# Patient Record
Sex: MALE | Race: WHITE
[De-identification: names, ages, dates, MRNs, and addresses within clinical notes are randomized per-mention and may not be internally consistent; named-entity substitution may affect disease eponyms.]

---

## 2017-04-17 ENCOUNTER — HOSPITAL ENCOUNTER (INPATIENT)
Dept: HOSPITAL 25 - ED | Age: 62
LOS: 5 days | Discharge: HOME | DRG: 638 | End: 2017-04-22
Attending: HOSPITALIST | Admitting: INTERNAL MEDICINE
Payer: COMMERCIAL

## 2017-04-17 DIAGNOSIS — I89.0: ICD-10-CM

## 2017-04-17 DIAGNOSIS — J44.9: ICD-10-CM

## 2017-04-17 DIAGNOSIS — Z80.9: ICD-10-CM

## 2017-04-17 DIAGNOSIS — E11.621: ICD-10-CM

## 2017-04-17 DIAGNOSIS — M86.672: ICD-10-CM

## 2017-04-17 DIAGNOSIS — B96.89: ICD-10-CM

## 2017-04-17 DIAGNOSIS — Z98.84: ICD-10-CM

## 2017-04-17 DIAGNOSIS — Z79.4: ICD-10-CM

## 2017-04-17 DIAGNOSIS — E78.5: ICD-10-CM

## 2017-04-17 DIAGNOSIS — I25.10: ICD-10-CM

## 2017-04-17 DIAGNOSIS — Z89.421: ICD-10-CM

## 2017-04-17 DIAGNOSIS — I45.10: ICD-10-CM

## 2017-04-17 DIAGNOSIS — G89.29: ICD-10-CM

## 2017-04-17 DIAGNOSIS — E11.42: ICD-10-CM

## 2017-04-17 DIAGNOSIS — M19.90: ICD-10-CM

## 2017-04-17 DIAGNOSIS — L03.116: ICD-10-CM

## 2017-04-17 DIAGNOSIS — G47.33: ICD-10-CM

## 2017-04-17 DIAGNOSIS — E66.01: ICD-10-CM

## 2017-04-17 DIAGNOSIS — Z82.49: ICD-10-CM

## 2017-04-17 DIAGNOSIS — B95.2: ICD-10-CM

## 2017-04-17 DIAGNOSIS — I10: ICD-10-CM

## 2017-04-17 DIAGNOSIS — E78.00: ICD-10-CM

## 2017-04-17 DIAGNOSIS — Z83.3: ICD-10-CM

## 2017-04-17 DIAGNOSIS — M25.569: ICD-10-CM

## 2017-04-17 DIAGNOSIS — E11.69: Primary | ICD-10-CM

## 2017-04-17 DIAGNOSIS — M54.9: ICD-10-CM

## 2017-04-17 DIAGNOSIS — E11.65: ICD-10-CM

## 2017-04-17 DIAGNOSIS — F17.210: ICD-10-CM

## 2017-04-17 DIAGNOSIS — I87.2: ICD-10-CM

## 2017-04-17 DIAGNOSIS — B95.62: ICD-10-CM

## 2017-04-17 DIAGNOSIS — L97.429: ICD-10-CM

## 2017-04-17 LAB
ALBUMIN SERPL BCG-MCNC: 3.8 G/DL (ref 3.2–5.2)
ALP SERPL-CCNC: 77 U/L (ref 34–104)
ALT SERPL W P-5'-P-CCNC: 13 U/L (ref 7–52)
ANION GAP SERPL CALC-SCNC: 4 MMOL/L (ref 2–11)
AST SERPL-CCNC: 10 U/L (ref 13–39)
BUN SERPL-MCNC: 17 MG/DL (ref 6–24)
BUN/CREAT SERPL: 21 (ref 8–20)
CALCIUM SERPL-MCNC: 9.7 MG/DL (ref 8.6–10.3)
CHLORIDE SERPL-SCNC: 94 MMOL/L (ref 101–111)
CK SERPL-CCNC: 37 U/L (ref 10–223)
GLOBULIN SER CALC-MCNC: 4.3 G/DL (ref 2–4)
GLUCOSE SERPL-MCNC: 61 MG/DL (ref 70–100)
HCO3 SERPL-SCNC: 38 MMOL/L (ref 22–32)
HCT VFR BLD AUTO: 36 % (ref 42–52)
HGB BLD-MCNC: 11.7 G/DL (ref 14–18)
MCH RBC QN AUTO: 27 PG (ref 27–31)
MCHC RBC AUTO-ENTMCNC: 33 G/DL (ref 31–36)
MCV RBC AUTO: 82 FL (ref 80–94)
POTASSIUM SERPL-SCNC: 3.4 MMOL/L (ref 3.5–5)
PROT SERPL-MCNC: 8.1 G/DL (ref 6.4–8.9)
RBC # BLD AUTO: 4.42 10^6/UL (ref 4–5.4)
SODIUM SERPL-SCNC: 136 MMOL/L (ref 133–145)
TROPONIN I SERPL-MCNC: 0.01 NG/ML (ref ?–0.04)
WBC # BLD AUTO: 9.4 10^3/UL (ref 3.5–10.8)

## 2017-04-17 PROCEDURE — 87641 MR-STAPH DNA AMP PROBE: CPT

## 2017-04-17 PROCEDURE — 81003 URINALYSIS AUTO W/O SCOPE: CPT

## 2017-04-17 PROCEDURE — 87640 STAPH A DNA AMP PROBE: CPT

## 2017-04-17 PROCEDURE — 83036 HEMOGLOBIN GLYCOSYLATED A1C: CPT

## 2017-04-17 PROCEDURE — 84484 ASSAY OF TROPONIN QUANT: CPT

## 2017-04-17 PROCEDURE — 87186 SC STD MICRODIL/AGAR DIL: CPT

## 2017-04-17 PROCEDURE — C1751 CATH, INF, PER/CENT/MIDLINE: HCPCS

## 2017-04-17 PROCEDURE — 87077 CULTURE AEROBIC IDENTIFY: CPT

## 2017-04-17 PROCEDURE — 80202 ASSAY OF VANCOMYCIN: CPT

## 2017-04-17 PROCEDURE — 85610 PROTHROMBIN TIME: CPT

## 2017-04-17 PROCEDURE — 82550 ASSAY OF CK (CPK): CPT

## 2017-04-17 PROCEDURE — 80048 BASIC METABOLIC PNL TOTAL CA: CPT

## 2017-04-17 PROCEDURE — 87205 SMEAR GRAM STAIN: CPT

## 2017-04-17 PROCEDURE — 82947 ASSAY GLUCOSE BLOOD QUANT: CPT

## 2017-04-17 PROCEDURE — 71010: CPT

## 2017-04-17 PROCEDURE — 85730 THROMBOPLASTIN TIME PARTIAL: CPT

## 2017-04-17 PROCEDURE — 87040 BLOOD CULTURE FOR BACTERIA: CPT

## 2017-04-17 PROCEDURE — 83605 ASSAY OF LACTIC ACID: CPT

## 2017-04-17 PROCEDURE — 80053 COMPREHEN METABOLIC PANEL: CPT

## 2017-04-17 PROCEDURE — 94760 N-INVAS EAR/PLS OXIMETRY 1: CPT

## 2017-04-17 PROCEDURE — 87070 CULTURE OTHR SPECIMN AEROBIC: CPT

## 2017-04-17 PROCEDURE — 93005 ELECTROCARDIOGRAM TRACING: CPT

## 2017-04-17 PROCEDURE — 86140 C-REACTIVE PROTEIN: CPT

## 2017-04-17 PROCEDURE — 36415 COLL VENOUS BLD VENIPUNCTURE: CPT

## 2017-04-17 PROCEDURE — 85025 COMPLETE CBC W/AUTO DIFF WBC: CPT

## 2017-04-17 PROCEDURE — 94660 CPAP INITIATION&MGMT: CPT

## 2017-04-17 PROCEDURE — 85652 RBC SED RATE AUTOMATED: CPT

## 2017-04-17 RX ADMIN — BUPRENORPHINE AND NALOXONE SCH TAB.SL: 8; 2 TABLET SUBLINGUAL at 22:56

## 2017-04-17 RX ADMIN — INSULIN GLARGINE SCH UNITS: 100 INJECTION, SOLUTION SUBCUTANEOUS at 22:52

## 2017-04-17 RX ADMIN — CEFEPIME HYDROCHLORIDE SCH MLS/HR: 2 INJECTION, POWDER, FOR SOLUTION INTRAVENOUS at 15:05

## 2017-04-17 RX ADMIN — OXYCODONE HYDROCHLORIDE AND ACETAMINOPHEN PRN TAB: 5; 325 TABLET ORAL at 18:04

## 2017-04-17 RX ADMIN — OXYCODONE HYDROCHLORIDE AND ACETAMINOPHEN PRN TAB: 5; 325 TABLET ORAL at 22:55

## 2017-04-17 RX ADMIN — INSULIN LISPRO SCH UNITS: 100 INJECTION, SOLUTION INTRAVENOUS; SUBCUTANEOUS at 17:28

## 2017-04-17 RX ADMIN — HEPARIN SODIUM SCH UNITS: 5000 INJECTION INTRAVENOUS; SUBCUTANEOUS at 22:53

## 2017-04-17 NOTE — RAD
HISTORY: Left lower extremity pain and edema



TECHNIQUE: Multiple transverse and longitudinal ultrasound images were obtained of the

veins of the left lower extremity using grayscale, color Doppler, and spectral Doppler

imaging with and without compression and with augmentation. 



FINDINGS:



VEINS: The common femoral vein, deep femoral vein, femoral vein and popliteal vein are

compressible throughout their course, with normal flow on color Doppler imaging and normal

response to augmentation on spectral Doppler imaging.



SOFT TISSUES: Grossly normal. No large popliteal fossa cyst was identified.



IMPRESSION: 

No sonographic evidence of deep vein thrombosis.

## 2017-04-17 NOTE — RAD
HISTORY: Leg swelling



COMPARISONS: March 03, 2017



VIEWS:1: Single frontal portable view of the chest at 10:30 AM



FINDINGS:

LINES AND TUBES: None.

CARDIOMEDIASTINAL SILHOUETTE: The cardiomediastinal silhouette is stable.

PLEURA: The costophrenic angles are sharp. No pleural abnormalities are noted.

LUNG PARENCHYMA: There is prominence of the central pulmonary vasculature.

ABDOMEN: The upper abdomen is clear. There is no subphrenic gas.

BONES AND SOFT TISSUES: No bone or soft tissue abnormalities are noted.



IMPRESSION: PULMONARY VASCULAR CONGESTION

## 2017-04-17 NOTE — ED
zheng LLOYD Timothy, scribed for Mattie Johnston MD on 04/17/17 at 1046 .





Lower Extremity





- HPI Summary


HPI Summary: 





Shane Katz is a 60 yo DM male presenting to [facility], referred by Dr. Chase, 

as failure of outpt treatment with doxycycline for osteomyelitis and a 

nonhealing ulcer on his left heel for which he has been receiving Tx since 18 

weeks, but has noticed increased swelling with 9/10 pain and erythema in the 

past few days. Dr. Chase is concerned about possible DVT or cellulitis. He 

states he had a fever of 99.6 but no fever since. He is c/o dizziness today. He 

denies any CP or SOB. He states he had a cast on his left leg for weeks, which 

came off in the last week. His glucose levels were 140 yesterday. His MHx 

includes CAD, HLD, HTN, COPD, BiPAP dependent, respiratory failure 2013, 

arthritis, DM, cellulitis tobacco use. [facility] Dr. Marks, [facility], 

Dr. Mejia (ID) and the wound clinic.








- History of Current Complaint


Chief Complaint: EDExtremityLower


Stated Complaint: LT LEG COMPLAINT


Time Seen by Provider: 04/17/17 10:50


Hx Obtained From: Patient, Medical Records


Mechanism Of Injury: Unknown


Onset of Pain: Prior to Arrival


Onset/Duration: Still Present


Severity Initially: Moderate


Severity Currently: Moderate


Pain Intensity: 9


Pain Scale Used: 0-10 Numeric


Timing: Constant


Location: Is Discrete @ - left heel


Character Of Pain: Sharp


Associated Signs And Symptoms: Positive: Swelling, Redness, Other - drainage 

from left heel


Aggravating Factor(s): Standing


Alleviating Factor(s): Nothing


Able to Bear Weight: No





- Risk Factors


Gout Risk Factors: Age Over 40, Male





- Allergies/Home Medications


Allergies/Adverse Reactions: 


 Allergies











Allergy/AdvReac Type Severity Reaction Status Date / Time


 


No Known Allergies Allergy   Verified 03/14/17 13:11











Home Medications: 


 Home Medications





Atorvastatin* [Lipitor*] 80 mg PO DAILY 04/17/17 [History Confirmed 04/17/17]


Buprenorphine HCl-Naloxone HCl [Suboxone] 0.5 strip SL BID 04/17/17 [History 

Confirmed 04/17/17]


Calcium Carbonate CHEW TAB* [Tums*] 1,000 mg PO BID PRN 04/17/17 [History 

Confirmed 04/17/17]


Cholecalciferol TAB* [Vitamin D TAB*] 400 unit PO DAILY 04/17/17 [History 

Confirmed 04/17/17]


Insulin Aspart PEN(NF) [Novolog Flexpen(NF)] 0 - 100 unit SUBCUT DAILY 04/17/17 

[History Confirmed 04/17/17]


Lidocaine 4% GEL* [Topicaine 4% GEL*] 1 applic TOPICAL DAILY PRN 04/17/17 [

History Confirmed 04/17/17]


Lisinopril [Lisinopril 30 MG-] 30 mg PO DAILY 04/17/17 [History Confirmed 04/17/ 17]


LoraTADine TAB(NF) [Claritin 10 MG TAB(NF)] 20 mg PO DAILY PRN 04/17/17 [

History Confirmed 04/17/17]


Omeprazole CAP* [Prilosec CAP* 20 MG] 20 mg PO DAILY 04/17/17 [History 

Confirmed 04/17/17]


Vitamin B Complex TAB* [Complex B-100*] 1 tab PO DAILY 04/17/17 [History 

Confirmed 04/17/17]


metFORMIN* [Glucophage 1000 MG TAB *] 1,000 mg PO BID 04/17/17 [History 

Confirmed 04/17/17]


zzInsulin GLARGINE(*) [zzLantus(*)] 38 units SUBCUT BID 04/17/17 [History 

Confirmed 04/17/17]











PMH/Surg Hx/FS Hx/Imm Hx


Endocrine/Hematology History: Reports: Hx Diabetes


   Denies: Hx Anticoagulant Therapy, Hx Thyroid Disease


Cardiovascular History: Reports: Hx Coronary Artery Disease, Hx 

Hypercholesterolemia, Hx Hypertension - ON MEDS


   Denies: Hx Congestive Heart Failure, Hx Pacemaker/ICD, Other Cardiovascular 

Problems/Disorders


Respiratory History: Reports: Hx Chronic Obstructive Pulmonary Disease (COPD), 

Hx Sleep Apnea


   Denies: Hx Asthma


 History: 


   Denies: Hx Dialysis, Hx Renal Disease


Musculoskeletal History: Reports: Hx Arthritis - DEGENERATIVE ARTHRITIS, Hx 

Back Problems - CHRONIC KNEE AND BACK PAIN


Sensory History: Reports: Hx Contacts or Glasses


   Denies: Hx Hearing Aid


Opthamlomology History: Reports: Hx Contacts or Glasses


Neurological History: 


   Denies: Hx Dementia, Hx Seizures, Other Neuro Impairments/Disorders


Psychiatric History: 


   Denies: Hx Panic Disorder, Hx Substance Abuse, Other Psychiatric Issues/

Disorders





- Surgical History


Surgery Procedure, Year, and Place: BARIATRIC SURGERY, RT FOOT OSTEO(LITTLE TOE)

, RT KNEE SCOPE


Hx Anesthesia Reactions: No


Infectious Disease History: No


Infectious Disease History: 


   Denies: Hx Clostridium Difficile, Hx Hepatitis, Hx Human Immunodeficiency 

Virus (HIV), Hx of Known/Suspected MRSA, Hx Shingles, Hx Tuberculosis, Hx Known/

Suspected VRE, Hx Known/Suspected VRSA, History Other Infectious Disease, 

Traveled Outside the [place] in Last 30 Days





- Family History


Known Family History: Positive: Diabetes - grand parents





- Social History


Alcohol Use: None


Substance Use Type: Reports: None


Substance Use Comment - Amount & Last Used: OCCASIONAL


Smoking Status (MU): Former Smoker


Type: Cigarettes


Amount Used/How Often: 1 PPD


Length of Time of Smoking/Using Tobacco: 35 YEARS


Have You Smoked in the Last Year: No





Review of Systems


Positive: Fever


Eyes: Negative


ENT: Negative


Cardiovascular: Negative


Respiratory: Negative


Gastrointestinal: Negative


Genitourinary: Negative


Musculoskeletal: Other - left heel ulcer


Positive: Edema - left heel/leg


Skin: Other - erythema left heel and left lower leg 


Neurological: Other - dizziness


Psychological: Normal


All Other Systems Reviewed And Are Negative: Yes





Physical Exam


Triage Information Reviewed: Yes


Vital Signs On Initial Exam: 


 Initial Vitals











Temp Pulse Resp Pulse Ox


 


 97.7 F   73   20   97 


 


 04/17/17 09:52  04/17/17 09:52  04/17/17 09:52  04/17/17 09:52








/101


Vital Signs Reviewed: Yes


Appearance: Positive: Well-Appearing, Well-Nourished, Pain Distress


Skin: Positive: Warm, Skin Color Reflects Adequate Perfusion, Dry, Erythema @ - 

bilat lower extrem, left worse than right, Other - 7cm x 5cm x 2cm ulcer on 

left heel, open and draining, through the dermis. Diffuse redness, swelling, 

and open areas on the LLE and RLE from the foot to the knee. Weepy area between 

the great and second toe on the right. Open areas on the medial aspect of the 

RLE at the ankle, and the distal tip.


Head/Face: Positive: Normal Head/Face Inspection


Eyes: Positive: EOMI, Conjunctiva Clear


ENT: Positive: Normal ENT inspection, Pharynx normal.  Negative: Muffled/hoarse 

voice


Neck: Positive: Supple, Nontender


Respiratory/Lung Sounds: Positive: Clear to Auscultation, Breath Sounds Present


Cardiovascular: Positive: RRR, Pulses are Symmetrical in both Upper and Lower 

Extremities.  Negative: Murmur, Rub, Other - gallop


Abdomen Description: Positive: Nontender, Soft


Bowel Sounds: Positive: Present


Musculoskeletal: Positive: Strength/ROM Intact, Edema Left, Edema Right, Other 

- external brace on the right knee. Lymphedema wraps on RLE. Post-op shoe on 

the left foot, bandages on the right leg. see skin exam above


Neurological: Positive: Sensory/Motor Intact, Alert, Oriented to Person Place, 

Time.  Negative: Focal Deficit @, Slurred Speech


Psychiatric: Positive: Normal, Affect/Mood Appropriate





Diagnostics





- Vital Signs


 Vital Signs











  Temp Pulse Resp BP Pulse Ox


 


 04/17/17 09:56  97.7 F  74  16  145/93  98


 


 04/17/17 09:52  97.7 F  73  20   97














- Laboratory


Lab Results: 


 Lab Results











  04/17/17 04/17/17 04/17/17 Range/Units





  10:10 10:29 10:29 


 


WBC   9.4   (3.5-10.8)  10^3/ul


 


RBC   4.42   (4.0-5.4)  10^6/ul


 


Hgb   11.7 L   (14.0-18.0)  g/dl


 


Hct   36 L   (42-52)  %


 


MCV   82   (80-94)  fL


 


MCH   27   (27-31)  pg


 


MCHC   33   (31-36)  g/dl


 


RDW   16 H   (10.5-15)  %


 


Plt Count   326   (150-450)  10^3/ul


 


MPV   8   (7.4-10.4)  um3


 


Neut % (Auto)   74.1   (38-83)  %


 


Lymph % (Auto)   13.5 L   (25-47)  %


 


Mono % (Auto)   10.0 H   (1-9)  %


 


Eos % (Auto)   1.9   (0-6)  %


 


Baso % (Auto)   0.5   (0-2)  %


 


Absolute Neuts (auto)   7.0   (1.5-7.7)  10^3/ul


 


Absolute Lymphs (auto)   1.3   (1.0-4.8)  10^3/ul


 


Absolute Monos (auto)   0.9 H   (0-0.8)  10^3/ul


 


Absolute Eos (auto)   0.2   (0-0.6)  10^3/ul


 


Absolute Basos (auto)   0   (0-0.2)  10^3/ul


 


Absolute Nucleated RBC   0   10^3/ul


 


Nucleated RBC %   0   


 


ESR   94 H   (0-20)  mm/Hr


 


INR (Anticoag Therapy)    0.98  (0.89-1.11)  


 


APTT    30.6  (26.0-36.3)  seconds


 


Sodium     (133-145)  mmol/L


 


Potassium     (3.5-5.0)  mmol/L


 


Chloride     (101-111)  mmol/L


 


Carbon Dioxide     (22-32)  mmol/L


 


Anion Gap     (2-11)  mmol/L


 


BUN     (6-24)  mg/dL


 


Creatinine     (0.67-1.17)  mg/dL


 


Est GFR ( Amer)     (>60)  


 


Est GFR (Non-Af Amer)     (>60)  


 


BUN/Creatinine Ratio     (8-20)  


 


Glucose     ()  mg/dL


 


Lactic Acid     (0.5-2.0)  mmol/L


 


Calcium     (8.6-10.3)  mg/dL


 


Total Bilirubin     (0.2-1.0)  mg/dL


 


AST     (13-39)  U/L


 


ALT     (7-52)  U/L


 


Alkaline Phosphatase     ()  U/L


 


Total Creatine Kinase     ()  U/L


 


Troponin I     (<0.04)  ng/mL


 


C-Reactive Protein     (< 5.00)  mg/L


 


Total Protein     (6.4-8.9)  g/dL


 


Albumin     (3.2-5.2)  g/dL


 


Globulin     (2-4)  g/dL


 


Albumin/Globulin Ratio     (1-3)  


 


Urine Color  Straw    


 


Urine Appearance  Clear    


 


Urine pH  7.0    (5-9)  


 


Ur Specific Gravity  1.008 L    (1.010-1.030)  


 


Urine Protein  Negative    (Negative)  


 


Urine Ketones  Negative    (Negative)  


 


Urine Blood  Negative    (Negative)  


 


Urine Nitrate  Negative    (Negative)  


 


Urine Bilirubin  Negative    (Negative)  


 


Urine Urobilinogen  Negative    (Negative)  


 


Ur Leukocyte Esterase  Negative    (Negative)  


 


Urine Glucose  Negative    (Negative)  














  04/17/17 04/17/17 Range/Units





  10:29 10:29 


 


WBC    (3.5-10.8)  10^3/ul


 


RBC    (4.0-5.4)  10^6/ul


 


Hgb    (14.0-18.0)  g/dl


 


Hct    (42-52)  %


 


MCV    (80-94)  fL


 


MCH    (27-31)  pg


 


MCHC    (31-36)  g/dl


 


RDW    (10.5-15)  %


 


Plt Count    (150-450)  10^3/ul


 


MPV    (7.4-10.4)  um3


 


Neut % (Auto)    (38-83)  %


 


Lymph % (Auto)    (25-47)  %


 


Mono % (Auto)    (1-9)  %


 


Eos % (Auto)    (0-6)  %


 


Baso % (Auto)    (0-2)  %


 


Absolute Neuts (auto)    (1.5-7.7)  10^3/ul


 


Absolute Lymphs (auto)    (1.0-4.8)  10^3/ul


 


Absolute Monos (auto)    (0-0.8)  10^3/ul


 


Absolute Eos (auto)    (0-0.6)  10^3/ul


 


Absolute Basos (auto)    (0-0.2)  10^3/ul


 


Absolute Nucleated RBC    10^3/ul


 


Nucleated RBC %    


 


ESR    (0-20)  mm/Hr


 


INR (Anticoag Therapy)    (0.89-1.11)  


 


APTT    (26.0-36.3)  seconds


 


Sodium  136   (133-145)  mmol/L


 


Potassium  3.4 L   (3.5-5.0)  mmol/L


 


Chloride  94 L   (101-111)  mmol/L


 


Carbon Dioxide  38 H   (22-32)  mmol/L


 


Anion Gap  4   (2-11)  mmol/L


 


BUN  17   (6-24)  mg/dL


 


Creatinine  0.81   (0.67-1.17)  mg/dL


 


Est GFR ( Amer)  125.4   (>60)  


 


Est GFR (Non-Af Amer)  97.5   (>60)  


 


BUN/Creatinine Ratio  21.0 H   (8-20)  


 


Glucose  61 L   ()  mg/dL


 


Lactic Acid   1.4  (0.5-2.0)  mmol/L


 


Calcium  9.7   (8.6-10.3)  mg/dL


 


Total Bilirubin  0.40   (0.2-1.0)  mg/dL


 


AST  10 L   (13-39)  U/L


 


ALT  13   (7-52)  U/L


 


Alkaline Phosphatase  77   ()  U/L


 


Total Creatine Kinase  37   ()  U/L


 


Troponin I  0.01   (<0.04)  ng/mL


 


C-Reactive Protein  43.41 H   (< 5.00)  mg/L


 


Total Protein  8.1   (6.4-8.9)  g/dL


 


Albumin  3.8   (3.2-5.2)  g/dL


 


Globulin  4.3 H   (2-4)  g/dL


 


Albumin/Globulin Ratio  0.9 L   (1-3)  


 


Urine Color    


 


Urine Appearance    


 


Urine pH    (5-9)  


 


Ur Specific Gravity    (1.010-1.030)  


 


Urine Protein    (Negative)  


 


Urine Ketones    (Negative)  


 


Urine Blood    (Negative)  


 


Urine Nitrate    (Negative)  


 


Urine Bilirubin    (Negative)  


 


Urine Urobilinogen    (Negative)  


 


Ur Leukocyte Esterase    (Negative)  


 


Urine Glucose    (Negative)  











Result Diagrams: 


 04/17/17 10:29





 04/17/17 10:29


Lab Statement: Any lab studies that have been ordered have been reviewed, and 

results considered in the medical decision making process.





- Radiology


  ** CXR


Xray Interpretation: Positive (See Comments) - IMPRESSION: PULMONARY VASCULAR 

CONGESTION


Radiology Interpretation Completed By: Radiologist





- Ultrasound


  ** No standard instances


Ultrasound Interpretation: No Acute Changes - IMPRESSION:  No sonographic 

evidence of deep vein thrombosis.


Ultrasound Interpretation Completed By: Radiologist - venous doppler





- EKG


  ** 1107


Cardiac Rate: NL - 71 BPM


EKG Interpretation: NSR @ 71 BPM, prolonged IV, RBBB, LPFB, axis 113, no acute 

changes


EKG Comparison: No Significant Change - compared to 12/13/2012





Re-Evaluation





- Re-Evaluation


  ** First Eval


Re-Evaluation Time: 13:58


Change: Unchanged


Comment: Pt is informed of lab and imaging study results, and is agreeable to 

be admitted.





Lower Extremity Course/Dx





- Course


Assessment/Plan: Shane Katz is a 60 yo male [facility] 

pain in his leftheel with edema and erythema, concerned for DVT/cellulitis. In 

the ED he received IV fluids. His CXR suggests pulmonary vascular congestion. 

His EKG shows NSR with prolonged IV conduction time and RBBB, LPFB. After 

clinical examination and review of his imaging and lab studies, as well as 

discussion with Dr. Soto, he will be admitted to [facility] for further evaluation 

and treatment due to failure of outpt treatment for osteomyelitis with 

doxycycline. No evidence for DVT on US of LLE.





- Diagnoses


Differential Diagnosis/HQI/PQRI: Positive: Cellulitis, DVT, Osteomyelitis


Provider Diagnoses: 


 Cellulitis, Osteomyelitis, DIABETIC FOOT ULCER








- Physician Notifications


Discussed Care of Patient With: 1344  - Dr. Soto (hospitalist) - discussed Pt 

condition, agrees to admit


Instructed by Provider To: Admit As Inpatient





Discharge





- Discharge Plan


Condition: Stable


Disposition: ADMITTED TO [facility]


Discharge Disposition Comment: admission for further evaluation and treatment





The documentation as recorded by the zheng gibbons Timothy accurately 

reflects the service I personally performed and the decisions made by me, Mattie Johnston MD.

## 2017-04-17 NOTE — HP
HISTORY AND PHYSICAL:

 

DATE OF ADMISSION:  04/17/17



ATTENDING PHYSICIAN WHILE IN THE HOSPITAL:  Dr. Jose Soto* (report being 
dictated by Jesse Hernandez NP).



PRIMARY CARE PHYSICIAN:  Dr. Blevins at the [facility].

 

CONSULTING ID SPECIALIST:  Dr. Mejia.

 

CONSULTING SURGEON:  Dr. Bailey.

 

CHIEF COMPLAINT:  Left lower extremity redness and swelling.

 

HISTORY OF PRESENT ILLNESS:  Mr. Katz is a 61-year-old male patient who has a 
history of diabetes.  He has a history of DAVID, COPD, lymphedema, chronic back 
pain, osteomyelitis in the past who recently completed a long course of IV 
antibiotics with Dr. Mejia in the outpatient setting.  He had been off 
antibiotics for possibly 2 to 3 weeks.  He has been doing okay, has been 
following with his wound clinic.  Unfortunately though, the last couple of days 
he has noticed his left leg has gotten more red, swelling.  It has become hot, 
he noticed that the redness had been increasing up into his groin.  He does 
admit to having some clear discharge from both of his legs and he does state 
that he has not had any drainage from the wound or any purulent discharge.  He 
did admit to having some chills.  He states the redness was getting worse.  He 
was evaluated 2 days ago and was started on doxycycline tablets by the wound 
clinic.  He was reevaluated today and the redness, despite the doxycycline, was 
getting worse and he was sent to the ER for evaluation.  He was evaluated here.
  It was noted that his ESR and CRP were climbing despite p.o. antibiotics.  
With his history of diabetes and the fact that the redness was getting worse 
and he had failed outpatient therapy, we were asked to evaluate in consult.  He 
does state that the wound is not getting any bigger. He has been on a wound vac 
for sometime, but this had been discontinued because of the possibility of 
infection.  He states that his sugars have been running in the 150s.  He states 
that he has not had any fevers, but he did have some chills and there has been 
no other symptoms.  No chest pain, shortness of breath, nausea, or vomiting.  
He was evaluated by Dr. Johnston here in the ER.  We were asked to evaluate 
because it was felt he is going to need IV antibiotics.

 

PAST MEDICAL HISTORY:  Significant for:

 

1.  Diabetes.

2.  Hypertension.

3.  Lymphedema.

4.  Back pain.

5.  COPD.

6.  Osteomyelitis.

7.  DAVID.

 

PAST SURGICAL HISTORY:

1.  He has had a right partial toe amputation.

2.  Cardiac catheterization with no intervention.

3.  He has had a partial gastrectomy.

4.  He has had knee arthroscopies.

 

HOME MEDICATIONS:  According to the list sent over from wound clinic include:

 

1.  Lidocaine 4% 1 application topically daily as needed.

2.  Doxycycline 100 mg p.o. b.i.d.

3.  Vitamin B 1 tablet daily.

4.  Omeprazole 20 mg daily.

5.  Insulin aspart 0 to 100 units subcu with meals.

6.  Lantus 38 units subcu b.i.d.

7.  Lisinopril 30 mg daily.

8.  Atorvastatin 80 mg daily.

9.  Atenolol 50 mg daily.

10.  Metformin 1000 mg p.o. b.i.d.

11.  Claritin 20 mg p.o. daily as needed.

12.  Vitamin D 400 units p.o. daily.

13.  Calcium carbonate 1000 mg p.o. b.i.d. as needed.

14.  Suboxone half a strip sublingual b.i.d.

 

ALLERGIES TO MEDICATIONS:  Include no known drug allergies.

 

FAMILY HISTORY:  His mother had cancer.  Father had an MI.

 

SOCIAL HISTORY:  He is a former smoker, he quit about 4 years ago.  He does not 
drink alcohol.  Surrogate decision maker is his wife.

 

REVIEW OF SYSTEMS:  There is no documented fever.  He did admit to having 
chills. There is no double vision.  He denies having any rhinorrhea.  No sore 
throat.  No thyroid enlargement.  Denies having any chest pain.  There is no 
orthopnea.  No nocturnal dyspnea.  There is no abdominal pain.  No nausea.  No 
vomiting.  No dysuria.  No frequency.  No seizure.  No loss of consciousness.  
No pruritus. There is skin ulceration per my HPI.  Review of 14 systems 
completed, all others negative.

 

                               PHYSICAL EXAMINATION

 

GENERAL:  At this time, Mr. Katz is a 61-year-old male patient.  He is 
chronically ill appearing.  He is morbidly obese.  He is sitting on the ER 
stretcher.

 

VITAL SIGNS:  Blood pressure 118/63, pulse 75, respirations 16, O2 sat 100% on 
room air, temperature 97.7.

 

HEENT:  Head atraumatic and normocephalic.  Eyes:  EOMs intact.  Sclerae 
anicteric. Throat:  Oral mucosa appeared to be moist.  No oropharyngeal 
erythema.

 

NECK:  Supple.

 

LUNGS:  Clear to auscultation bilaterally.  No wheezes, rales, or rhonchi.

 

HEART:  Sounds S1 and S2.  Regular rate and rhythm.  No murmurs, rubs, or 
gallops.

 

ABDOMEN:  Soft, flat, nontender.  Bowel sounds are present.

 

EXTREMITIES:  Pulses 2+ throughout.  He had +2 pitting edema bilaterally.

 

NEUROLOGICAL:  He is awake, alert, oriented x3.  Speech is clear.  Tongue 
midline. No facial droops.  No gross focal deficits.

 

SKIN:  Intact with the exception he has got a left heel ulcer measuring 
approximately 6 x 8 cm, it is about 1 cm deep to the left heel.  He has 
erythema extending from his foot all the way up into his groin.  The left leg 
is hot and edematous.  The right leg does also have erythema and he also has a 
slight ulceration between his 2 toes as well and again erythema extending up 
into his midcalf region.  Otherwise, skin is intact.

 

 LABORATORY DATA:  Today revealed WBC 9.4, RBC of 4.42, hemoglobin 11.7, 
hematocrit of 36, platelet count 326.  ESR is 94, last ESR was 456.  INR 0.98, 
PTT at 30.6. The sodium was 136, potassium 3.4, chloride 94, bicarb 38, BUN 17, 
creatinine 0.81, glucose 61, lactate 1.4, calcium 9.7, total bili 0.4, AST 10, 
ALT 13, alk phos 77, troponin 0.01, CRP of 43, albumin of 3.8.  Urine obtained 
was negative.  He did have a venous Doppler study which revealed no evidence of 
DVT.  He had an EKG obtained today which showed a normal sinus rhythm, rate of 
71 with a right bundle branch block.  He has had history of right bundle branch 
block.  His last EKG from 5 years ago shows this as well.  No acute changes.  
He had a chest x-ray obtained today, impression, pulmonary vascular congestion.
  Old medical records were reviewed.

 

ASSESSMENT AND PLAN:  Mr. Katz is a 61-year-old male patient coming into the 
ER today with complaints of a worsening redness and swelling of the left lower 
extremity.  The hospitalist service was asked to evaluate for admission.  He 
will be admitted under inpatient status for:

 

1.  Left lower extremity osteomyelitis.  He did have an MRI on 04/22/16, which 
did show calcaneal osteomyelitis.  In addition to this, he finished IV 
antibiotics, he thinks about 3 weeks ago and then now comes in with worsening 
redness and swelling despite being on 2 days of doxycycline.  I will go ahead 
and put him on vancomycin and cefepime.  I will get a consult with Dr. Bailey and a consult with Dr. Mejia to evaluate.  For the time being, we 
will continue his vancomycin, continue his cefepime.  We will send off blood 
cultures as well and we will continue to monitor.  Cellulitis.  There was a 
wound culture that was done in the ER according to Dr. Johnston.

2.  Diabetes.  We will go ahead and put him on Lispro sliding scale.  I will 
check A1c tomorrow.  I will continue his Lantus.

3.  Lymphedema.  Will elevate the extremities.

4.  Diabetic foot ulcer.  We will change this dressing daily.  The wound care 
did put in recommendations for the care of this, which have been ordered.

5.  Chronic obstructive pulmonary disease.  We will go ahead and put him on 
p.r.n. albuterol.  He does not appear to be having an exacerbation currently.

6.  Obstructive sleep apnea.  Continue with CPAP.

7.  History of osteomyelitis.  Again, he will be on IV antibiotics.

8.  DVT prophylaxis.  He will be placed on heparin subcu.

9.  Fluids, electrolytes, nutrition.  He can have a consistent carb diet.

10.  Code status.  He is a full code.

 

TIME SPENT:  On admission was approximately 60 minutes, greater than half the 
time was spent face-to-face with the patient obtaining my history and physical, 
other half the time spent going over the plan of care with the patient and 
implementing plan of care.  I did discuss the plan of care with my attending, 
Dr. Soto; he is in agreement.

 

____________________________________ 

JESSE HERNANDEZ NP

 

CC:  Dr. Blevins; Dr. Mejia; Dr. Bailey; Dr. Marks*

 

[id number]/CPS #: [id number]

MTDD

## 2017-04-18 LAB
ANION GAP SERPL CALC-SCNC: 5 MMOL/L (ref 2–11)
BUN SERPL-MCNC: 16 MG/DL (ref 6–24)
BUN/CREAT SERPL: 20.8 (ref 8–20)
CALCIUM SERPL-MCNC: 9 MG/DL (ref 8.6–10.3)
CHLORIDE SERPL-SCNC: 94 MMOL/L (ref 101–111)
GLUCOSE SERPL-MCNC: 266 MG/DL (ref 70–100)
HCO3 SERPL-SCNC: 35 MMOL/L (ref 22–32)
HCT VFR BLD AUTO: 34 % (ref 42–52)
HGB BLD-MCNC: 10.9 G/DL (ref 14–18)
MCH RBC QN AUTO: 27 PG (ref 27–31)
MCHC RBC AUTO-ENTMCNC: 32 G/DL (ref 31–36)
MCV RBC AUTO: 82 FL (ref 80–94)
POTASSIUM SERPL-SCNC: 3.9 MMOL/L (ref 3.5–5)
RBC # BLD AUTO: 4.11 10^6/UL (ref 4–5.4)
SODIUM SERPL-SCNC: 134 MMOL/L (ref 133–145)
WBC # BLD AUTO: 7.3 10^3/UL (ref 3.5–10.8)

## 2017-04-18 PROCEDURE — 5A09357 ASSISTANCE WITH RESPIRATORY VENTILATION, LESS THAN 24 CONSECUTIVE HOURS, CONTINUOUS POSITIVE AIRWAY PRESSURE: ICD-10-PCS | Performed by: HOSPITALIST

## 2017-04-18 RX ADMIN — INSULIN GLARGINE SCH UNITS: 100 INJECTION, SOLUTION SUBCUTANEOUS at 21:04

## 2017-04-18 RX ADMIN — CEFEPIME HYDROCHLORIDE SCH MLS/HR: 2 INJECTION, POWDER, FOR SOLUTION INTRAVENOUS at 04:34

## 2017-04-18 RX ADMIN — INSULIN LISPRO SCH UNITS: 100 INJECTION, SOLUTION INTRAVENOUS; SUBCUTANEOUS at 13:23

## 2017-04-18 RX ADMIN — OXYCODONE HYDROCHLORIDE AND ACETAMINOPHEN PRN TAB: 5; 325 TABLET ORAL at 03:04

## 2017-04-18 RX ADMIN — OXYCODONE HYDROCHLORIDE AND ACETAMINOPHEN PRN TAB: 5; 325 TABLET ORAL at 07:27

## 2017-04-18 RX ADMIN — CEFEPIME HYDROCHLORIDE SCH MLS/HR: 2 INJECTION, POWDER, FOR SOLUTION INTRAVENOUS at 04:37

## 2017-04-18 RX ADMIN — VANCOMYCIN HYDROCHLORIDE SCH MLS/HR: 1 INJECTION, POWDER, LYOPHILIZED, FOR SOLUTION INTRAVENOUS at 01:48

## 2017-04-18 RX ADMIN — VANCOMYCIN HYDROCHLORIDE SCH MLS/HR: 1 INJECTION, POWDER, LYOPHILIZED, FOR SOLUTION INTRAVENOUS at 17:51

## 2017-04-18 RX ADMIN — VANCOMYCIN HYDROCHLORIDE SCH MLS/HR: 1 INJECTION, POWDER, LYOPHILIZED, FOR SOLUTION INTRAVENOUS at 09:34

## 2017-04-18 RX ADMIN — LISINOPRIL SCH MG: 10 TABLET ORAL at 07:46

## 2017-04-18 RX ADMIN — OXYCODONE HYDROCHLORIDE AND ACETAMINOPHEN PRN TAB: 5; 325 TABLET ORAL at 21:03

## 2017-04-18 RX ADMIN — BUPRENORPHINE AND NALOXONE SCH TAB.SL: 8; 2 TABLET SUBLINGUAL at 21:02

## 2017-04-18 RX ADMIN — OXYCODONE HYDROCHLORIDE AND ACETAMINOPHEN PRN TAB: 5; 325 TABLET ORAL at 11:34

## 2017-04-18 RX ADMIN — BUPRENORPHINE AND NALOXONE SCH TAB.SL: 8; 2 TABLET SUBLINGUAL at 07:48

## 2017-04-18 RX ADMIN — OXYCODONE HYDROCHLORIDE AND ACETAMINOPHEN PRN TAB: 5; 325 TABLET ORAL at 15:46

## 2017-04-18 RX ADMIN — INSULIN LISPRO SCH UNITS: 100 INJECTION, SOLUTION INTRAVENOUS; SUBCUTANEOUS at 17:51

## 2017-04-18 RX ADMIN — KETOROLAC TROMETHAMINE PRN MG: 30 INJECTION, SOLUTION INTRAMUSCULAR; INTRAVENOUS at 17:55

## 2017-04-18 RX ADMIN — INSULIN GLARGINE SCH UNITS: 100 INJECTION, SOLUTION SUBCUTANEOUS at 07:47

## 2017-04-18 RX ADMIN — HEPARIN SODIUM SCH UNITS: 5000 INJECTION INTRAVENOUS; SUBCUTANEOUS at 04:35

## 2017-04-18 RX ADMIN — CEFEPIME HYDROCHLORIDE SCH MLS/HR: 2 INJECTION, POWDER, FOR SOLUTION INTRAVENOUS at 15:48

## 2017-04-18 RX ADMIN — HEPARIN SODIUM SCH UNITS: 5000 INJECTION INTRAVENOUS; SUBCUTANEOUS at 21:05

## 2017-04-18 RX ADMIN — HEPARIN SODIUM SCH UNITS: 5000 INJECTION INTRAVENOUS; SUBCUTANEOUS at 13:24

## 2017-04-18 RX ADMIN — INSULIN LISPRO SCH UNITS: 100 INJECTION, SOLUTION INTRAVENOUS; SUBCUTANEOUS at 09:53

## 2017-04-18 RX ADMIN — OMEPRAZOLE SCH MG: 20 CAPSULE, DELAYED RELEASE ORAL at 07:46

## 2017-04-18 RX ADMIN — ATENOLOL SCH MG: 50 TABLET ORAL at 07:47

## 2017-04-18 RX ADMIN — ATORVASTATIN CALCIUM SCH MG: 80 TABLET, FILM COATED ORAL at 07:47

## 2017-04-18 RX ADMIN — ONDANSETRON PRN MG: 2 INJECTION INTRAMUSCULAR; INTRAVENOUS at 17:59

## 2017-04-18 NOTE — PN
Progress Note





- Progress Note


Note: 





Brief Surgery Note: (full note dictated)





S: Mr. Katz is well-known to us from his bariatric surgery (sleeve gastrectomy

) in 10/2016. He was admitted from wound clinic yesterday for worsening 

cellulitis of the Left LE. He has a pre-existing L heel ulcer (+ MRI and prior 

bone scan for osteo) for which he has completed a 6 week course of IV abx as 

well as wound care (debridement, total contact casting, off-loading footwear, 

and most recently a Wound VAC). The LLE pain and redness seemed to increase 

over the past 5-7 days. Pain is localized primarily to the posterior heel and 

lower leg. He has weeping of both LEs r/t his chronic venous insufficiency and 

lymphedema. He normally uses lymphedema wraps at home. He had a neg venous 

duplex on admission and a recent CTA that demonstrated moderate occlusive 

disease but nothing requiring intervention.





O:


 Vital Signs - 8 hr











  04/18/17 04/18/17 04/18/17





  03:04 03:16 04:46


 


Temperature  98.2 F 


 


Pulse Rate  83 


 


Respiratory 14 16 16





Rate   


 


Blood Pressure  136/92 





(mmHg)   


 


O2 Sat by Pulse  94 





Oximetry   














  04/18/17 04/18/17 04/18/17





  07:27 07:48 09:27


 


Temperature   


 


Pulse Rate   


 


Respiratory 16 16 16





Rate   


 


Blood Pressure   





(mmHg)   


 


O2 Sat by Pulse   





Oximetry   








Right LE: chronic stasis changes w/ hyperpigmentation, scaling, weeping of 

clear serous fluid, and dull erythema. This includes the foot and toes.


Left LE: similar to R LE; there is also an open heel wound (plantar surface) 

which is actually fairly clean w/ good granulation base, measuring ~ 4 x 6 cm. 

It is minimally tender, a bit boggy, but no obvious exposed bone. He is most 

tender over the posterior heel and lower leg.





A: cellulitis Left LE; open Left heel wound (w/ osteo? based on most recent 

imaging)





P: cont abx per hosp/ID; no need for surg debridement at present; he does need 

some form of compression in addition to his wound care (ordered). Will follow 

periodically and at your request if any changes.

## 2017-04-18 NOTE — PN
Subjective


Date of Service: 04/18/17


Interval History: 








Patient reports his LE's appear "more red" today. Reports some weeping form his 

RLE. No fevers or chills. Reports good appetite. No abdominal pain. 











Objective


Active Medications: 








Acetaminophen (Tylenol Tab*)  650 mg PO Q4H PRN


   PRN Reason: FEVER/PAIN


Albuterol (Ventolin 2.5 Mg/3 Ml Neb.Sol*)  2.5 mg INH Q2H PRN


   PRN Reason: SOB/WHEEZING


Atenolol (Tenormin Tab*)  50 mg PO QAM [facility]


   Last Admin: 04/18/17 07:47 Dose:  50 mg


Atorvastatin Calcium (Lipitor*)  80 mg PO DAILY [facility]


   Last Admin: 04/18/17 07:47 Dose:  80 mg


Buprenorphine/Naloxone (Suboxone 8-2 Mg Sl Tab*)  0.5 tab.sl SL BID [facility]


   Last Admin: 04/18/17 07:48 Dose:  0.5 tab.sl


Dextrose (D50w Syringe 50 Ml*)  12.5 gm IV PUSH .FOR FS < 60 - SS PRN


   PRN Reason: FS < 60


Heparin Sodium (Porcine) (Heparin Vial(*))  5,000 units SUBCUT Q8HR [facility]


   Last Admin: 04/18/17 13:24 Dose:  5,000 units


Cefepime HCl 2 gm/ Sodium (Chloride)  50 mls @ 100 mls/hr IVPB 0300,1500 [facility]


   Last Admin: 04/18/17 04:37 Dose:  100 mls/hr


Vancomycin HCl 1,250 mg/ (Sodium Chloride)  250 mls @ 166.667 mls/hr IVPB Q8H 

[facility]


   Last Admin: 04/18/17 09:34 Dose:  166.667 mls/hr


Insulin Glargine (Lantus(*))  38 units SUBCUT BID [facility]


   Last Admin: 04/18/17 07:47 Dose:  38 units


Insulin Human Lispro (Humalog*)  0 units SUBCUT AC [facility]


   PRN Reason: Protocol


   Last Admin: 04/18/17 13:23 Dose:  15 units


Lisinopril (Prinivil Tab*)  30 mg PO DAILY [facility]


   Last Admin: 04/18/17 07:46 Dose:  30 mg


Omeprazole (Prilosec Cap*)  20 mg PO DAILY@0730 [facility]


   Last Admin: 04/18/17 07:46 Dose:  20 mg


Ondansetron HCl (Zofran Inj*)  4 mg IV Q6H PRN


   PRN Reason: NAUSEA


Oxycodone/Acetaminophen (Percocet 5/325 Tab*)  2 tab PO Q4H PRN


   PRN Reason: PAIN


   Last Admin: 04/18/17 11:34 Dose:  2 tab


Pharmacy Consult (Vancomycin Per Pharmacy*)  1 note FOLLOW UP . PRN


   PRN Reason: PER PROTOCOL


Pharmacy Profile Note (Vancomycin Trough Check)  1 note FOLLOW UP 0900 [facility]


   Stop: 04/19/17 09:01








 Vital Signs











  04/17/17 04/17/17 04/17/17





  16:10 16:21 16:30


 


Temperature 98.4 F 98.4 F 


 


Pulse Rate 88 88 


 


Respiratory 16 16 16





Rate   


 


Blood Pressure 150/101 150/101 





(mmHg)   


 


O2 Sat by Pulse 97 97 





Oximetry   














  04/17/17 04/17/17 04/17/17





  18:04 20:04 22:55


 


Temperature   


 


Pulse Rate   


 


Respiratory 16 16 16





Rate   


 


Blood Pressure   





(mmHg)   


 


O2 Sat by Pulse   





Oximetry   














  04/17/17 04/17/17 04/17/17





  22:56 23:47 23:55


 


Temperature   99.5 F


 


Pulse Rate   85


 


Respiratory 16 16 16





Rate   


 


Blood Pressure   145/76





(mmHg)   


 


O2 Sat by Pulse   94





Oximetry   














  04/18/17 04/18/17 04/18/17





  03:04 03:16 04:46


 


Temperature  98.2 F 


 


Pulse Rate  83 


 


Respiratory 14 16 16





Rate   


 


Blood Pressure  136/92 





(mmHg)   


 


O2 Sat by Pulse  94 





Oximetry   














  04/18/17 04/18/17 04/18/17





  07:27 07:40 07:48


 


Temperature  98.1 F 


 


Pulse Rate  84 


 


Respiratory 16 16 16





Rate   


 


Blood Pressure  155/85 





(mmHg)   


 


O2 Sat by Pulse  94 





Oximetry   














  04/18/17 04/18/17 04/18/17





  08:00 09:27 09:48


 


Temperature   


 


Pulse Rate   


 


Respiratory 16 16 16





Rate   


 


Blood Pressure   





(mmHg)   


 


O2 Sat by Pulse   





Oximetry   














  04/18/17





  11:34


 


Temperature 


 


Pulse Rate 


 


Respiratory 16





Rate 


 


Blood Pressure 





(mmHg) 


 


O2 Sat by Pulse 





Oximetry 











Oxygen Devices in Use Now: None


Appearance: morbidly obese male sitting up in a chair in NAD. A+O x3


Eyes: No Scleral Icterus, PERRLA


Ears/Nose/Mouth/Throat: NL Teeth, Lips, Gums, Mucous Membranes Moist


Neck: NL Appearance and Movements; NL JVP


Respiratory: Symmetrical Chest Expansion and Respiratory Effort, Clear to 

Auscultation


Cardiovascular: NL Sounds; No Murmurs; No JVD, RRR, - - 3+ B/L edema 


Abdominal: - - obese, round soft, nontender 


Extremities: - - B/L LE have bilateral chronic venous stasis changes and noted 2

-3+ edema. LLE: has noted erythema with heal ulcer (did not underess dressing 

which was just reapplied by surgery). RLE: has noted erythema, weeping, 

possible skin tear or weeping to inner LEs. 


Neurological: Alert and Oriented x 3, NL Muscle Strength and Tone


Lines/Tubes/Other Access: Clean, Dry and Intact Peripheral IV


Nutrition: Taking PO's


Result Diagrams: 


 04/18/17 06:50





 04/18/17 06:50


Additional Lab and Data: 


 Lab Results











  04/17/17 04/17/17 04/17/17 Range/Units





  10:10 10:29 10:29 


 


WBC   9.4   (3.5-10.8)  10^3/ul


 


RBC   4.42   (4.0-5.4)  10^6/ul


 


Hgb   11.7 L   (14.0-18.0)  g/dl


 


Hct   36 L   (42-52)  %


 


MCV   82   (80-94)  fL


 


MCH   27   (27-31)  pg


 


MCHC   33   (31-36)  g/dl


 


RDW   16 H   (10.5-15)  %


 


Plt Count   326   (150-450)  10^3/ul


 


MPV   8   (7.4-10.4)  um3


 


Neut % (Auto)   74.1   (38-83)  %


 


Lymph % (Auto)   13.5 L   (25-47)  %


 


Mono % (Auto)   10.0 H   (1-9)  %


 


Eos % (Auto)   1.9   (0-6)  %


 


Baso % (Auto)   0.5   (0-2)  %


 


Absolute Neuts (auto)   7.0   (1.5-7.7)  10^3/ul


 


Absolute Lymphs (auto)   1.3   (1.0-4.8)  10^3/ul


 


Absolute Monos (auto)   0.9 H   (0-0.8)  10^3/ul


 


Absolute Eos (auto)   0.2   (0-0.6)  10^3/ul


 


Absolute Basos (auto)   0   (0-0.2)  10^3/ul


 


Absolute Nucleated RBC   0   10^3/ul


 


Nucleated RBC %   0   


 


ESR   94 H   (0-20)  mm/Hr


 


INR (Anticoag Therapy)    0.98  (0.89-1.11)  


 


APTT    30.6  (26.0-36.3)  seconds


 


Sodium     (133-145)  mmol/L


 


Potassium     (3.5-5.0)  mmol/L


 


Chloride     (101-111)  mmol/L


 


Carbon Dioxide     (22-32)  mmol/L


 


Anion Gap     (2-11)  mmol/L


 


BUN     (6-24)  mg/dL


 


Creatinine     (0.67-1.17)  mg/dL


 


Est GFR ( Amer)     (>60)  


 


Est GFR (Non-Af Amer)     (>60)  


 


BUN/Creatinine Ratio     (8-20)  


 


Glucose     ()  mg/dL


 


Lactic Acid     (0.5-2.0)  mmol/L


 


Calcium     (8.6-10.3)  mg/dL


 


Total Bilirubin     (0.2-1.0)  mg/dL


 


AST     (13-39)  U/L


 


ALT     (7-52)  U/L


 


Alkaline Phosphatase     ()  U/L


 


Total Creatine Kinase     ()  U/L


 


Troponin I     (<0.04)  ng/mL


 


C-Reactive Protein     (< 5.00)  mg/L


 


Total Protein     (6.4-8.9)  g/dL


 


Albumin     (3.2-5.2)  g/dL


 


Globulin     (2-4)  g/dL


 


Albumin/Globulin Ratio     (1-3)  


 


Urine Color  Straw    


 


Urine Appearance  Clear    


 


Urine pH  7.0    (5-9)  


 


Ur Specific Gravity  1.008 L    (1.010-1.030)  


 


Urine Protein  Negative    (Negative)  


 


Urine Ketones  Negative    (Negative)  


 


Urine Blood  Negative    (Negative)  


 


Urine Nitrate  Negative    (Negative)  


 


Urine Bilirubin  Negative    (Negative)  


 


Urine Urobilinogen  Negative    (Negative)  


 


Ur Leukocyte Esterase  Negative    (Negative)  


 


Urine Glucose  Negative    (Negative)  














  04/17/17 04/17/17 Range/Units





  10:29 10:29 


 


WBC    (3.5-10.8)  10^3/ul


 


RBC    (4.0-5.4)  10^6/ul


 


Hgb    (14.0-18.0)  g/dl


 


Hct    (42-52)  %


 


MCV    (80-94)  fL


 


MCH    (27-31)  pg


 


MCHC    (31-36)  g/dl


 


RDW    (10.5-15)  %


 


Plt Count    (150-450)  10^3/ul


 


MPV    (7.4-10.4)  um3


 


Neut % (Auto)    (38-83)  %


 


Lymph % (Auto)    (25-47)  %


 


Mono % (Auto)    (1-9)  %


 


Eos % (Auto)    (0-6)  %


 


Baso % (Auto)    (0-2)  %


 


Absolute Neuts (auto)    (1.5-7.7)  10^3/ul


 


Absolute Lymphs (auto)    (1.0-4.8)  10^3/ul


 


Absolute Monos (auto)    (0-0.8)  10^3/ul


 


Absolute Eos (auto)    (0-0.6)  10^3/ul


 


Absolute Basos (auto)    (0-0.2)  10^3/ul


 


Absolute Nucleated RBC    10^3/ul


 


Nucleated RBC %    


 


ESR    (0-20)  mm/Hr


 


INR (Anticoag Therapy)    (0.89-1.11)  


 


APTT    (26.0-36.3)  seconds


 


Sodium  136   (133-145)  mmol/L


 


Potassium  3.4 L   (3.5-5.0)  mmol/L


 


Chloride  94 L   (101-111)  mmol/L


 


Carbon Dioxide  38 H   (22-32)  mmol/L


 


Anion Gap  4   (2-11)  mmol/L


 


BUN  17   (6-24)  mg/dL


 


Creatinine  0.81   (0.67-1.17)  mg/dL


 


Est GFR ( Amer)  125.4   (>60)  


 


Est GFR (Non-Af Amer)  97.5   (>60)  


 


BUN/Creatinine Ratio  21.0 H   (8-20)  


 


Glucose  61 L   ()  mg/dL


 


Lactic Acid   1.4  (0.5-2.0)  mmol/L


 


Calcium  9.7   (8.6-10.3)  mg/dL


 


Total Bilirubin  0.40   (0.2-1.0)  mg/dL


 


AST  10 L   (13-39)  U/L


 


ALT  13   (7-52)  U/L


 


Alkaline Phosphatase  77   ()  U/L


 


Total Creatine Kinase  37   ()  U/L


 


Troponin I  0.01   (<0.04)  ng/mL


 


C-Reactive Protein  43.41 H   (< 5.00)  mg/L


 


Total Protein  8.1   (6.4-8.9)  g/dL


 


Albumin  3.8   (3.2-5.2)  g/dL


 


Globulin  4.3 H   (2-4)  g/dL


 


Albumin/Globulin Ratio  0.9 L   (1-3)  


 


Urine Color    


 


Urine Appearance    


 


Urine pH    (5-9)  


 


Ur Specific Gravity    (1.010-1.030)  


 


Urine Protein    (Negative)  


 


Urine Ketones    (Negative)  


 


Urine Blood    (Negative)  


 


Urine Nitrate    (Negative)  


 


Urine Bilirubin    (Negative)  


 


Urine Urobilinogen    (Negative)  


 


Ur Leukocyte Esterase    (Negative)  


 


Urine Glucose    (Negative)  














Assess/Plan/Problems-Billing


Assessment: 60 yo male with a PMH of morbid obesity s/p sleeve gastrectomy, 

Diabetes, COPD, lymphedema, pre-existing L heel ulcer with osteo for which he 

recently finished 6 weeks of IV abx followed by Dr. Mejia (off abx for 2-3 

weeks) also followed by wound clinic who started him on Doxy 2 days ago who 

presented 4/17 with increased LLE redness and pain sent from wound clinic











- Patient Problems


(1) Cellulitis


Comment: 


- with chronic osteomyelitis and non-healing DM ulcer - recent 6 weeks of IV abx

, followed by ID as outpt. 


- Surgery consulted stating no need for wound debridment at this time. 


- wound cx MRSA positive. Blood cx negative day 1. 


- c/w vanco, cefepime


- await ID consult   





(2) Chronic pain


Comment: 


- suboxone started a few months ago per pt pain clinic has been weaning down 

narcotics. 


- Patient continues to report a lot of pain. Continue Pecocet prn. 


   





(3) HTN (hypertension)


Comment: 


controlled


c/w lisinopril,  Atenolol.   





(4) Type 2 diabetes mellitus


Comment: 


High blood sugars


Increase Lantus 45 units BID and lantus sliding scale    





(5) History of hypertension


Comment: 


- continue lisinopril, atenolol    





(6) Lymphedema


Comment: 


- ace wraps and elevation to LE's   





(7) Full code status








(8) DVT prophylaxis


Comment: 


HSQ   


Status and Disposition: 





inpatient requiring IV antibiotics. Estimated LOS > 2 days.

## 2017-04-18 NOTE — CONS
CONSULTATION REPORT:

 

DATE OF CONSULTATION:  04/18/17

 

REQUESTING PHYSICIAN:  Jesse Hernandez NP.

 

CONSULTING SERVICE:  Infectious Disease.

 

REASON FOR CONSULTATION:  Right leg cellulitis and wound infection.

 

IMPRESSION:

1.  Left calcaneal wound.  Followed at the wound clinic for few months.  He had a course of IV antib
iotics for calcaneal osteomyelitis, now with cellulitis of the ankle periwound area and into the leg
 in the setting of underlying lymphedema.  He has grown MRSA and Enterobacter.  He has not grown MRS
A in the past, seems to be a new infection for him.

2.  Diabetes with neuropathy.

3.  Morbid obesity.

4.  Hypertension.

5.  Obstructive sleep apnea.

 

RECOMMENDATION:  Agree with vancomycin goal trough 10 to 15 and cefepime 2 g every 12 hours.  I will
 follow his progress here and depending how he does may plan on another course of IV antibiotics.  JULES lechuga had apparently recently been making more progress with his wound healing but seems to now have a s
econdary infection of that wound, which is spread to the soft tissues.  I do not think that there is
 further scanning, which would change the management at this point.

 

HISTORY OF PRESENT ILLNESS:  A 61-year-old male with diabetes and left heel wound, now with worsenin
g left heel pain, swelling and redness.  He had been on IV cefepime for a few weeks followed by oral
 antibiotics and then over about the last 3 to 4 days, he started to get redness, pain, and swelling
 around the wound that seemed to develop he feels after removal of the cast, that seemed to help whe
n it was on as far as wound healing goes.  Because of those symptoms, he was seen in the wound clini
c.  He was directed to the ER on the 17th.  The wound culture was taken. A Gram stain showed gram-po
sitive cocci and gram-negative bacilli that is growing Enterobacter.  The PCR was positive for Staph
 aureus and MRSA.  He was started on antibiotics.  He has had some improvement in redness and swelli
ng as well as pain, but was quite severe.  He has had no fever, chills, or sweats.  An ultrasound wa
s negative for DVT.

 

PAST MEDICAL HISTORY:

1.  Diabetes.

2.  Peripheral neuropathy.

3.  Chronic left calcaneal ulceration and osteomyelitis.

4.  Hypertension.

5.  Lymphedema bilaterally.

6.  Chronic back pain.

7.  COPD.

8.  Obstructive sleep apnea.

9.  Status post right toe partial amputation.

10.  Status post partial gastrectomy.

11.  Status post bilateral knee arthroscopies.

 

ALLERGIES:  No known drug allergies.

 

HOME MEDICATIONS:

1.  Tylenol.

2.  Albuterol.

3.  Atenolol.

4.  Lipitor.

5.  Suboxone.

6.  Cefepime 2 g every 12 hours.

7.  Insulin glargine.

8.  Ketorolac.

9.  Lisinopril.

10.  Omeprazole.

11.  Vancomycin 1250 mg every 8 hours.

 

SOCIAL HISTORY:  He lives in [place].  No travel.  No sick contacts.

 

FAMILY HISTORY:  No recurrent infections.

 

REVIEW OF SYSTEMS:  All negative except as noted above.

 

PHYSICAL EXAMINATION:  Vital Signs:  Temperature is 36.7, heart rate 80, respiratory rate 14, blood 
pressure 150/80, O2 sat 95% on room air.  General:  He is awake, not in distress.  Neurologic:  He i
s oriented x3.  Follows all commands. He has decreased sensation to light touch in both feet bilater
ally.  HEENT:  There is no conjunctival hemorrhage.  Oropharynx without lesions.  Neck was supple wi
thout nuchal rigidity.  Lymph nodes:  There is no cervical, supraclavicular, inguinal, axillary or e
pitrochlear lymphadenopathy.  Heart:  Regular rate and rhythm without murmurs, rubs, or gallops.  Nasra
ngs are clear to auscultation bilaterally.  Abdomen: Soft, nontender, and obese.  Skin:  There is no
 rash or splinter hemorrhages.  Musculoskeletal:  There is no spine tenderness to palpation. There i
s a left calcaneal ulcer with underlying granulation tissue.  There is no foul drainage.  Edges are 
without necrotic tissue.  There is erythema extending from the heel up to the ankle and lower leg wi
th tenderness.  No fluctuance or crepitus.  Extremities:  There was no lower extremity edema.  DP an
d PT pulses were 2+ and symmetric.

 

DIAGNOSTIC STUDIES/LABORATORY DATA:  White blood cell count is 7, hemoglobin 10, platelets 257, crea
tinine is 0.7.  CRP was 40.  Blood cultures are negative.

 

Please see impression and recommendations as outlined above.

 

Thanks for asking me to see Mr. Katz in consultation.

 

 [id number]/[facility] #: [id number]

## 2017-04-18 NOTE — CONS
CC:  Dr. Joselo Bailey; Dr. Blevins, [facility]

 

SURGICAL CONSULT NOTE:

 

DATE OF CONSULT:  04/18/17

 

ATTENDING SURGEON:  Dr. Joselo Bailey.

 

[facility] COMPLAINT:  Cellulitis, left lower extremity.

 

HISTORY OF PRESENT ILLNESS:  This is a 61-year-old morbidly obese male with 
chronic venous insufficiency of both lower extremities and was followed by the 
wound clinic.  He has had a preexisting left heel ulcer with osteomyelitis of 
the calcaneus, previously treated with 6-week course of IV antibiotics.  He was 
seen in the wound clinic last Friday at which time the wound VAC was apparently 
placed.  He experienced increased pain of the left lower extremity along with 
redness and increase in the temperature.  He was sent to the emergency room for 
evaluation and possible admission from the wound clinic yesterday.  He had been 
attending wound clinic for wound care in the form of total contact cast when 
the ulcer first started in December.  He has undergone debridement.  He also 
has an offloading shoe and has been undergoing dressing changes.  He was 
recently changed to the wound VAC, was unclear if it would have contributed to 
his current cellulitis.  He has chronic edema from venous insufficiency and 
sleeps in the chair with consequently little or no lower extremity elevation.  
He normally uses lymphedema wraps at home. He states that Dr. Marks, who 
follows him in the wound clinic had been considering the option of hyperbaric 
oxygen treatment for this slow and nonhealing left calcaneal ulcer.  Venous 
duplex on admission was negative for DVT.  A CT angiogram done on 02/28/17, 
showed mild-to-moderate occlusive arterial disease, but vessels were 
essentially patent to both feet and did not require any intervention.  MRI was 
performed on 03/22/17, which was consistent with osteomyelitis of the left 
calcaneus.  He states that pain significantly increased over the past 4 to 5 
days in the left lower extremity, particularly the posterior heel and posterior 
lower leg.

 

PAST MEDICAL HISTORY:  Type 2 diabetes, hypertension, morbid obesity (he is six 
months status post laparoscopic sleeve gastrectomy with Dr. Ramírez), chronic 
venous insufficiency and lymphedema of lower extremities, chronic back pain, 
COPD, sleep apnea.

 

CURRENT MEDICATIONS:  Reviewed and include:

 

1.  Lidocaine topically.

2.  Doxycycline 100 mg b.i.d.

3.  Omeprazole 20 mg q. day.

4.  Lantus 38 units subcu b.i.d.

5.  Lisinopril.

6.  Atorvastatin.

7.  Atenolol.

8.  Metformin.

9.  Claritin.

10.  Vitamin D.

11.  Calcium carbonate.

12.  Suboxone one-half strip sublingual b.i.d.

 

FAMILY HISTORY:  Per his admission history and physical.

 

SOCIAL HISTORY:  Per his admission history and physical.

 

REVIEW OF SYSTEMS:  Per his admission history and physical.

 

PHYSICAL EXAM:  Height 6 feet, weight 352 pounds which is down 30 pounds since 
his sleeve gastrectomy, BMI is 47.7, T-max 99.5, blood pressure systolic from 
117 to 155, diastolic 63 to 101, pulse is 70s to 80s, room air saturation 95%.  
General: A morbidly obese male, in no acute distress.  The remainder of the 
exam is limited to the lower extremities.  Skin of both lower extremities shows 
chronic venous insufficiency changes with scaling, weeping clear serous fluid, 
and hyperpigmentation as well as lipodermatosclerosis.  The right leg also has 
barrier cream applied to it.  There is a question of some ulceration or 
superficial skin breakdown between toes on the right foot though I did not 
examine in detail.  On the left, findings of the skin are similar to the right 
leg.  In addition, there is an open wound on the inferior plantar surface of 
the heel measuring approximately  6 cm.  There is a healthy-appearing base of 
granulation tissue.  There is some minor tenderness and bogginess to palpation 
directly on the wound, but no evidence of undrained collection or deep 
infection.  There is no obvious bone exposed.  He is most tender in the 
posterior heel and posterior lower leg.  There is some mild-to-dull erythema 
throughout the leg.

 

LABORATORY DATA:  Recent labs, white blood cell count on admission 9400, repeat 
this morning 7300; hemoglobin 11.7 with 10.9 on repeat.  CRP is 43.  ESR is 94, 
which is up consistent with prior.  CO2 somewhat elevated at 35.  Point-of-care 
glucose has ranged from 271 to 396.  A1c is 9.0.

 

IMPRESSION:  Cellulitis, left lower extremity with open heel wound (possibly 
with osteomyelitis).

 

PLAN:  No need for any surgical intervention, debridement, or otherwise.  We 
would agree with continuation of antibiotics with further direction per ID.  I 
did add some compression in the form of 4-inch Ace wraps x2 for each lower 
extremity to help with the edema.  We will follow with you and see the patient 
periodically until he is able to return to his usual baseline and follow up 
with the wound clinic.

 

____________________________________ SHIRA MOORE

 

[id number]/[facility] #: [id number]

MYA

## 2017-04-19 LAB
ANION GAP SERPL CALC-SCNC: 3 MMOL/L (ref 2–11)
BUN SERPL-MCNC: 27 MG/DL (ref 6–24)
BUN/CREAT SERPL: 25.7 (ref 8–20)
CALCIUM SERPL-MCNC: 8.7 MG/DL (ref 8.6–10.3)
CHLORIDE SERPL-SCNC: 95 MMOL/L (ref 101–111)
GLUCOSE SERPL-MCNC: 274 MG/DL (ref 70–100)
HCO3 SERPL-SCNC: 32 MMOL/L (ref 22–32)
HCT VFR BLD AUTO: 34 % (ref 42–52)
HGB BLD-MCNC: 11.1 G/DL (ref 14–18)
MCH RBC QN AUTO: 27 PG (ref 27–31)
MCHC RBC AUTO-ENTMCNC: 32 G/DL (ref 31–36)
MCV RBC AUTO: 83 FL (ref 80–94)
POTASSIUM SERPL-SCNC: 4.5 MMOL/L (ref 3.5–5)
RBC # BLD AUTO: 4.13 10^6/UL (ref 4–5.4)
SODIUM SERPL-SCNC: 130 MMOL/L (ref 133–145)
WBC # BLD AUTO: 6.4 10^3/UL (ref 3.5–10.8)

## 2017-04-19 RX ADMIN — ONDANSETRON PRN MG: 2 INJECTION INTRAMUSCULAR; INTRAVENOUS at 00:43

## 2017-04-19 RX ADMIN — KETOROLAC TROMETHAMINE PRN MG: 30 INJECTION, SOLUTION INTRAMUSCULAR; INTRAVENOUS at 10:48

## 2017-04-19 RX ADMIN — KETOROLAC TROMETHAMINE PRN MG: 30 INJECTION, SOLUTION INTRAMUSCULAR; INTRAVENOUS at 19:54

## 2017-04-19 RX ADMIN — INSULIN LISPRO SCH UNITS: 100 INJECTION, SOLUTION INTRAVENOUS; SUBCUTANEOUS at 08:48

## 2017-04-19 RX ADMIN — INSULIN LISPRO SCH UNITS: 100 INJECTION, SOLUTION INTRAVENOUS; SUBCUTANEOUS at 17:28

## 2017-04-19 RX ADMIN — ONDANSETRON PRN MG: 2 INJECTION INTRAMUSCULAR; INTRAVENOUS at 05:27

## 2017-04-19 RX ADMIN — OMEPRAZOLE SCH MG: 20 CAPSULE, DELAYED RELEASE ORAL at 07:19

## 2017-04-19 RX ADMIN — KETOROLAC TROMETHAMINE PRN MG: 30 INJECTION, SOLUTION INTRAMUSCULAR; INTRAVENOUS at 00:42

## 2017-04-19 RX ADMIN — BUPRENORPHINE AND NALOXONE SCH TAB.SL: 8; 2 TABLET SUBLINGUAL at 21:21

## 2017-04-19 RX ADMIN — VANCOMYCIN HYDROCHLORIDE SCH MLS/HR: 1 INJECTION, POWDER, LYOPHILIZED, FOR SOLUTION INTRAVENOUS at 00:42

## 2017-04-19 RX ADMIN — CALCIUM CARBONATE (ANTACID) CHEW TAB 500 MG PRN MG: 500 CHEW TAB at 17:27

## 2017-04-19 RX ADMIN — OXYCODONE HYDROCHLORIDE AND ACETAMINOPHEN PRN TAB: 5; 325 TABLET ORAL at 19:54

## 2017-04-19 RX ADMIN — HEPARIN SODIUM SCH UNITS: 5000 INJECTION INTRAVENOUS; SUBCUTANEOUS at 21:28

## 2017-04-19 RX ADMIN — Medication SCH CAP: at 21:23

## 2017-04-19 RX ADMIN — ONDANSETRON PRN MG: 2 INJECTION INTRAMUSCULAR; INTRAVENOUS at 19:54

## 2017-04-19 RX ADMIN — BUPRENORPHINE AND NALOXONE SCH TAB.SL: 8; 2 TABLET SUBLINGUAL at 08:47

## 2017-04-19 RX ADMIN — ATENOLOL SCH MG: 50 TABLET ORAL at 08:47

## 2017-04-19 RX ADMIN — OXYCODONE HYDROCHLORIDE AND ACETAMINOPHEN PRN TAB: 5; 325 TABLET ORAL at 15:07

## 2017-04-19 RX ADMIN — ATORVASTATIN CALCIUM SCH MG: 80 TABLET, FILM COATED ORAL at 08:47

## 2017-04-19 RX ADMIN — CALCIUM CARBONATE (ANTACID) CHEW TAB 500 MG PRN MG: 500 CHEW TAB at 21:39

## 2017-04-19 RX ADMIN — LISINOPRIL SCH MG: 10 TABLET ORAL at 08:48

## 2017-04-19 RX ADMIN — VANCOMYCIN HYDROCHLORIDE SCH MLS/HR: 1 INJECTION, POWDER, LYOPHILIZED, FOR SOLUTION INTRAVENOUS at 17:27

## 2017-04-19 RX ADMIN — CALCIUM CARBONATE (ANTACID) CHEW TAB 500 MG PRN MG: 500 CHEW TAB at 02:12

## 2017-04-19 RX ADMIN — INSULIN GLARGINE SCH UNITS: 100 INJECTION, SOLUTION SUBCUTANEOUS at 21:22

## 2017-04-19 RX ADMIN — INSULIN GLARGINE SCH UNITS: 100 INJECTION, SOLUTION SUBCUTANEOUS at 08:48

## 2017-04-19 RX ADMIN — INSULIN LISPRO SCH UNITS: 100 INJECTION, SOLUTION INTRAVENOUS; SUBCUTANEOUS at 13:13

## 2017-04-19 RX ADMIN — OXYCODONE HYDROCHLORIDE AND ACETAMINOPHEN PRN TAB: 5; 325 TABLET ORAL at 00:47

## 2017-04-19 RX ADMIN — OXYCODONE HYDROCHLORIDE AND ACETAMINOPHEN PRN TAB: 5; 325 TABLET ORAL at 05:04

## 2017-04-19 RX ADMIN — CEFEPIME HYDROCHLORIDE SCH MLS/HR: 2 INJECTION, POWDER, FOR SOLUTION INTRAVENOUS at 03:28

## 2017-04-19 RX ADMIN — CALCIUM CARBONATE (ANTACID) CHEW TAB 500 MG PRN MG: 500 CHEW TAB at 07:19

## 2017-04-19 RX ADMIN — OXYCODONE HYDROCHLORIDE AND ACETAMINOPHEN PRN TAB: 5; 325 TABLET ORAL at 23:47

## 2017-04-19 RX ADMIN — OXYCODONE HYDROCHLORIDE AND ACETAMINOPHEN PRN TAB: 5; 325 TABLET ORAL at 09:53

## 2017-04-19 RX ADMIN — HEPARIN SODIUM SCH UNITS: 5000 INJECTION INTRAVENOUS; SUBCUTANEOUS at 05:06

## 2017-04-19 RX ADMIN — VANCOMYCIN HYDROCHLORIDE SCH MLS/HR: 1 INJECTION, POWDER, LYOPHILIZED, FOR SOLUTION INTRAVENOUS at 10:47

## 2017-04-19 RX ADMIN — CEFEPIME HYDROCHLORIDE SCH MLS/HR: 2 INJECTION, POWDER, FOR SOLUTION INTRAVENOUS at 15:07

## 2017-04-19 RX ADMIN — CALCIUM CARBONATE (ANTACID) CHEW TAB 500 MG PRN MG: 500 CHEW TAB at 12:46

## 2017-04-19 RX ADMIN — ONDANSETRON PRN MG: 2 INJECTION INTRAMUSCULAR; INTRAVENOUS at 12:45

## 2017-04-19 RX ADMIN — HEPARIN SODIUM SCH UNITS: 5000 INJECTION INTRAVENOUS; SUBCUTANEOUS at 13:13

## 2017-04-19 NOTE — PN
Subjective


Date of Service: 04/19/17


Interval History: 








Pt reports he feels better today, less LE pain. Denies fever or chills. No N/V/

D. 











Objective


Active Medications: 








Acetaminophen (Tylenol Tab*)  650 mg PO Q4H PRN


   PRN Reason: FEVER/PAIN


Albuterol (Ventolin 2.5 Mg/3 Ml Neb.Sol*)  2.5 mg INH Q2H PRN


   PRN Reason: SOB/WHEEZING


Atenolol (Tenormin Tab*)  50 mg PO QAM [facility]


   Last Admin: 04/19/17 08:47 Dose:  50 mg


Atorvastatin Calcium (Lipitor*)  80 mg PO DAILY [facility]


   Last Admin: 04/19/17 08:47 Dose:  80 mg


Buprenorphine/Naloxone (Suboxone 8-2 Mg Sl Tab*)  0.5 tab.sl SL BID [facility]


   Last Admin: 04/19/17 08:47 Dose:  0.5 tab.sl


Calcium Carbonate (Tums*)  1,000 mg PO Q4H PRN


   PRN Reason: INDIGESTION


   Last Admin: 04/19/17 07:19 Dose:  1,000 mg


Cholecalciferol (Vitamin D Tab*)  400 unit PO DAILY [facility]


Dextrose (D50w Syringe 50 Ml*)  12.5 gm IV PUSH .FOR FS < 60 - SS PRN


   PRN Reason: FS < 60


Heparin Sodium (Porcine) (Heparin Vial(*))  5,000 units SUBCUT Q8HR [facility]


   Last Admin: 04/19/17 05:06 Dose:  5,000 units


Cefepime HCl 2 gm/ Sodium (Chloride)  50 mls @ 100 mls/hr IVPB 0300,1500 [facility]


   Last Admin: 04/19/17 03:28 Dose:  100 mls/hr


Vancomycin HCl 1,250 mg/ (Sodium Chloride)  250 mls @ 166.667 mls/hr IVPB Q8H 

[facility]


   Last Admin: 04/19/17 10:47 Dose:  166.667 mls/hr


Insulin Glargine (Lantus(*))  45 units SUBCUT BID [facility]


   Last Admin: 04/19/17 08:48 Dose:  45 units


Insulin Human Lispro (Humalog*)  0 units SUBCUT AC [facility]


   PRN Reason: Protocol


   Last Admin: 04/19/17 08:48 Dose:  9 units


Ketorolac Tromethamine (Toradol Inj*)  30 mg IV PUSH Q6H PRN


   PRN Reason: PAIN


   Last Admin: 04/19/17 10:48 Dose:  30 mg


Lactobacillus Rhamnosus (Culturelle*)  1 cap PO BID [facility]


Lisinopril (Prinivil Tab*)  30 mg PO DAILY [facility]


   Last Admin: 04/19/17 08:48 Dose:  30 mg


Multivitamins/Minerals (Theragran/Minerals Tab*)  1 tab PO DAILY [facility]


Omeprazole (Prilosec Cap*)  20 mg PO DAILY@0730 [facility]


   Last Admin: 04/19/17 07:19 Dose:  20 mg


Ondansetron HCl (Zofran Inj*)  4 mg IV Q6H PRN


   PRN Reason: NAUSEA


   Last Admin: 04/19/17 05:27 Dose:  4 mg


Oxycodone/Acetaminophen (Percocet 5/325 Tab*)  2 tab PO Q4H PRN


   PRN Reason: PAIN


   Last Admin: 04/19/17 09:53 Dose:  2 tab


Pharmacy Consult (Vancomycin Per Pharmacy*)  1 note FOLLOW UP . PRN


   PRN Reason: PER PROTOCOL


Prochlorperazine Edisylate (Compazine Inj*)  5 mg IV Q6H PRN


   PRN Reason: NAUSEA/VOMITING


   Last Admin: 04/18/17 22:49 Dose:  5 mg


Vitamin B Complex/Vitamin E (Complex B-100*)  1 tab PO DAILY [facility]








 Vital Signs











  04/18/17 04/18/17 04/18/17





  13:34 15:46 16:21


 


Temperature   98.9 F


 


Pulse Rate   68


 


Respiratory 16 14 17





Rate   


 


Blood Pressure   128/67





(mmHg)   


 


O2 Sat by Pulse   96





Oximetry   














  04/18/17 04/18/17 04/18/17





  17:46 20:00 21:02


 


Temperature   


 


Pulse Rate   


 


Respiratory 14 18 18





Rate   


 


Blood Pressure   





(mmHg)   


 


O2 Sat by Pulse   





Oximetry   














  04/18/17 04/18/17 04/18/17





  21:03 21:39 23:02


 


Temperature  97.8 F 


 


Pulse Rate  67 


 


Respiratory 18 16 18





Rate   


 


Blood Pressure  115/60 





(mmHg)   


 


O2 Sat by Pulse  96 





Oximetry   














  04/18/17 04/19/17 04/19/17





  23:22 00:47 02:47


 


Temperature 98.8 F  


 


Pulse Rate 69  


 


Respiratory 20 18 16





Rate   


 


Blood Pressure 129/75  





(mmHg)   


 


O2 Sat by Pulse 93  





Oximetry   














  04/19/17 04/19/17 04/19/17





  05:04 07:04 08:00


 


Temperature   


 


Pulse Rate   


 


Respiratory 18 16 16





Rate   


 


Blood Pressure   





(mmHg)   


 


O2 Sat by Pulse   92





Oximetry   














  04/19/17 04/19/17 04/19/17





  08:47 09:53 09:54


 


Temperature   


 


Pulse Rate   71


 


Respiratory 16 16 





Rate   


 


Blood Pressure   114/62





(mmHg)   


 


O2 Sat by Pulse   91





Oximetry   














  04/19/17 04/19/17 04/19/17





  09:58 10:47 11:14


 


Temperature   98.9 F


 


Pulse Rate 68  63


 


Respiratory 14 16 14





Rate   


 


Blood Pressure   112/59





(mmHg)   


 


O2 Sat by Pulse 94  92





Oximetry   














  04/19/17





  11:53


 


Temperature 


 


Pulse Rate 


 


Respiratory 16





Rate 


 


Blood Pressure 





(mmHg) 


 


O2 Sat by Pulse 





Oximetry 











Oxygen Devices in Use Now: None


Appearance: morbidly obese male sitting up in a chair in NAD. A+O x3


Eyes: No Scleral Icterus, PERRLA


Ears/Nose/Mouth/Throat: NL Teeth, Lips, Gums, Mucous Membranes Moist


Neck: NL Appearance and Movements; NL JVP


Respiratory: Symmetrical Chest Expansion and Respiratory Effort, Clear to 

Auscultation


Cardiovascular: NL Sounds; No Murmurs; No JVD, RRR


Abdominal: - - obese, round, soft, nontender


Skin: - - B/L LE's wrapped with dressings and ace wraps


Neurological: Alert and Oriented x 3, NL Sensation, NL Muscle Strength and Tone


Lines/Tubes/Other Access: Clean, Dry and Intact Peripheral IV


Nutrition: Taking PO's


Result Diagrams: 


 04/19/17 09:00





 04/19/17 08:23


Additional Lab and Data: 


 Lab Results











  04/17/17 04/17/17 04/17/17 Range/Units





  10:10 10:29 10:29 


 


WBC   9.4   (3.5-10.8)  10^3/ul


 


RBC   4.42   (4.0-5.4)  10^6/ul


 


Hgb   11.7 L   (14.0-18.0)  g/dl


 


Hct   36 L   (42-52)  %


 


MCV   82   (80-94)  fL


 


MCH   27   (27-31)  pg


 


MCHC   33   (31-36)  g/dl


 


RDW   16 H   (10.5-15)  %


 


Plt Count   326   (150-450)  10^3/ul


 


MPV   8   (7.4-10.4)  um3


 


Neut % (Auto)   74.1   (38-83)  %


 


Lymph % (Auto)   13.5 L   (25-47)  %


 


Mono % (Auto)   10.0 H   (1-9)  %


 


Eos % (Auto)   1.9   (0-6)  %


 


Baso % (Auto)   0.5   (0-2)  %


 


Absolute Neuts (auto)   7.0   (1.5-7.7)  10^3/ul


 


Absolute Lymphs (auto)   1.3   (1.0-4.8)  10^3/ul


 


Absolute Monos (auto)   0.9 H   (0-0.8)  10^3/ul


 


Absolute Eos (auto)   0.2   (0-0.6)  10^3/ul


 


Absolute Basos (auto)   0   (0-0.2)  10^3/ul


 


Absolute Nucleated RBC   0   10^3/ul


 


Nucleated RBC %   0   


 


ESR   94 H   (0-20)  mm/Hr


 


INR (Anticoag Therapy)    0.98  (0.89-1.11)  


 


APTT    30.6  (26.0-36.3)  seconds


 


Sodium     (133-145)  mmol/L


 


Potassium     (3.5-5.0)  mmol/L


 


Chloride     (101-111)  mmol/L


 


Carbon Dioxide     (22-32)  mmol/L


 


Anion Gap     (2-11)  mmol/L


 


BUN     (6-24)  mg/dL


 


Creatinine     (0.67-1.17)  mg/dL


 


Est GFR ( Amer)     (>60)  


 


Est GFR (Non-Af Amer)     (>60)  


 


BUN/Creatinine Ratio     (8-20)  


 


Glucose     ()  mg/dL


 


Lactic Acid     (0.5-2.0)  mmol/L


 


Calcium     (8.6-10.3)  mg/dL


 


Total Bilirubin     (0.2-1.0)  mg/dL


 


AST     (13-39)  U/L


 


ALT     (7-52)  U/L


 


Alkaline Phosphatase     ()  U/L


 


Total Creatine Kinase     ()  U/L


 


Troponin I     (<0.04)  ng/mL


 


C-Reactive Protein     (< 5.00)  mg/L


 


Total Protein     (6.4-8.9)  g/dL


 


Albumin     (3.2-5.2)  g/dL


 


Globulin     (2-4)  g/dL


 


Albumin/Globulin Ratio     (1-3)  


 


Urine Color  Straw    


 


Urine Appearance  Clear    


 


Urine pH  7.0    (5-9)  


 


Ur Specific Gravity  1.008 L    (1.010-1.030)  


 


Urine Protein  Negative    (Negative)  


 


Urine Ketones  Negative    (Negative)  


 


Urine Blood  Negative    (Negative)  


 


Urine Nitrate  Negative    (Negative)  


 


Urine Bilirubin  Negative    (Negative)  


 


Urine Urobilinogen  Negative    (Negative)  


 


Ur Leukocyte Esterase  Negative    (Negative)  


 


Urine Glucose  Negative    (Negative)  














  04/17/17 04/17/17 Range/Units





  10:29 10:29 


 


WBC    (3.5-10.8)  10^3/ul


 


RBC    (4.0-5.4)  10^6/ul


 


Hgb    (14.0-18.0)  g/dl


 


Hct    (42-52)  %


 


MCV    (80-94)  fL


 


MCH    (27-31)  pg


 


MCHC    (31-36)  g/dl


 


RDW    (10.5-15)  %


 


Plt Count    (150-450)  10^3/ul


 


MPV    (7.4-10.4)  um3


 


Neut % (Auto)    (38-83)  %


 


Lymph % (Auto)    (25-47)  %


 


Mono % (Auto)    (1-9)  %


 


Eos % (Auto)    (0-6)  %


 


Baso % (Auto)    (0-2)  %


 


Absolute Neuts (auto)    (1.5-7.7)  10^3/ul


 


Absolute Lymphs (auto)    (1.0-4.8)  10^3/ul


 


Absolute Monos (auto)    (0-0.8)  10^3/ul


 


Absolute Eos (auto)    (0-0.6)  10^3/ul


 


Absolute Basos (auto)    (0-0.2)  10^3/ul


 


Absolute Nucleated RBC    10^3/ul


 


Nucleated RBC %    


 


ESR    (0-20)  mm/Hr


 


INR (Anticoag Therapy)    (0.89-1.11)  


 


APTT    (26.0-36.3)  seconds


 


Sodium  136   (133-145)  mmol/L


 


Potassium  3.4 L   (3.5-5.0)  mmol/L


 


Chloride  94 L   (101-111)  mmol/L


 


Carbon Dioxide  38 H   (22-32)  mmol/L


 


Anion Gap  4   (2-11)  mmol/L


 


BUN  17   (6-24)  mg/dL


 


Creatinine  0.81   (0.67-1.17)  mg/dL


 


Est GFR ( Amer)  125.4   (>60)  


 


Est GFR (Non-Af Amer)  97.5   (>60)  


 


BUN/Creatinine Ratio  21.0 H   (8-20)  


 


Glucose  61 L   ()  mg/dL


 


Lactic Acid   1.4  (0.5-2.0)  mmol/L


 


Calcium  9.7   (8.6-10.3)  mg/dL


 


Total Bilirubin  0.40   (0.2-1.0)  mg/dL


 


AST  10 L   (13-39)  U/L


 


ALT  13   (7-52)  U/L


 


Alkaline Phosphatase  77   ()  U/L


 


Total Creatine Kinase  37   ()  U/L


 


Troponin I  0.01   (<0.04)  ng/mL


 


C-Reactive Protein  43.41 H   (< 5.00)  mg/L


 


Total Protein  8.1   (6.4-8.9)  g/dL


 


Albumin  3.8   (3.2-5.2)  g/dL


 


Globulin  4.3 H   (2-4)  g/dL


 


Albumin/Globulin Ratio  0.9 L   (1-3)  


 


Urine Color    


 


Urine Appearance    


 


Urine pH    (5-9)  


 


Ur Specific Gravity    (1.010-1.030)  


 


Urine Protein    (Negative)  


 


Urine Ketones    (Negative)  


 


Urine Blood    (Negative)  


 


Urine Nitrate    (Negative)  


 


Urine Bilirubin    (Negative)  


 


Urine Urobilinogen    (Negative)  


 


Ur Leukocyte Esterase    (Negative)  


 


Urine Glucose    (Negative)  














Assess/Plan/Problems-Billing


Assessment: 62 yo male with a PMH of morbid obesity s/p sleeve gastrectomy, 

Diabetes, COPD, lymphedema, pre-existing L heel ulcer with osteo for which he 

recently finished 6 weeks of IV abx followed by Dr. Mejia (off abx for 2-3 

weeks) also followed by wound clinic who started him on Doxy 2 days ago who 

presented 4/17 with increased LLE redness and pain sent from wound clinic











- Patient Problems


(1) Cellulitis


Comment: 


- with chronic osteomyelitis and non-healing DM ulcer - recent 6 weeks of IV abx

, followed by ID as outpt. 


- Surgery consulted stating no need for wound debridment at this time. 


- wound cx MRSA positive. Blood cx negative day 1. 


- c/w vanco, cefepime


- ID following,    





(2) Chronic pain


Comment: 


- suboxone started a few months ago per pt pain clinic has been weaning down 

narcotics. 


- Patient continues to report a lot of pain. Continue Pecocet prn. 


   





(3) HTN (hypertension)


Comment: 


controlled


c/w lisinopril,  Atenolol.   





(4) Type 2 diabetes mellitus


Comment: 


Continues to have high blood sugars, slightly improved today


Continue increased Lantus 45 units BID and lantus sliding scale    





(5) History of hypertension


Comment: 


- continue lisinopril, atenolol    





(6) Lymphedema


Comment: 


- ace wraps and elevation to LE's   





(7) Full code status








(8) DVT prophylaxis


Comment: 


HSQ   


Status and Disposition: 





inpatient requiring IV antibiotics. Estimated LOS > 2 days.

## 2017-04-20 LAB
ANION GAP SERPL CALC-SCNC: 6 MMOL/L (ref 2–11)
BUN SERPL-MCNC: 26 MG/DL (ref 6–24)
BUN/CREAT SERPL: 27.4 (ref 8–20)
CALCIUM SERPL-MCNC: 8.8 MG/DL (ref 8.6–10.3)
CHLORIDE SERPL-SCNC: 96 MMOL/L (ref 101–111)
GLUCOSE SERPL-MCNC: 175 MG/DL (ref 70–100)
HCO3 SERPL-SCNC: 30 MMOL/L (ref 22–32)
HCT VFR BLD AUTO: 34 % (ref 42–52)
HGB BLD-MCNC: 10.8 G/DL (ref 14–18)
MCH RBC QN AUTO: 27 PG (ref 27–31)
MCHC RBC AUTO-ENTMCNC: 32 G/DL (ref 31–36)
MCV RBC AUTO: 83 FL (ref 80–94)
POTASSIUM SERPL-SCNC: 4.8 MMOL/L (ref 3.5–5)
RBC # BLD AUTO: 4.07 10^6/UL (ref 4–5.4)
SODIUM SERPL-SCNC: 132 MMOL/L (ref 133–145)
WBC # BLD AUTO: 6 10^3/UL (ref 3.5–10.8)

## 2017-04-20 PROCEDURE — 02HV33Z INSERTION OF INFUSION DEVICE INTO SUPERIOR VENA CAVA, PERCUTANEOUS APPROACH: ICD-10-PCS | Performed by: HOSPITALIST

## 2017-04-20 RX ADMIN — HEPARIN SODIUM SCH UNITS: 5000 INJECTION INTRAVENOUS; SUBCUTANEOUS at 20:34

## 2017-04-20 RX ADMIN — CALCIUM CARBONATE (ANTACID) CHEW TAB 500 MG PRN MG: 500 CHEW TAB at 05:40

## 2017-04-20 RX ADMIN — CALCIUM CARBONATE (ANTACID) CHEW TAB 500 MG PRN MG: 500 CHEW TAB at 22:43

## 2017-04-20 RX ADMIN — BUPRENORPHINE AND NALOXONE SCH TAB.SL: 8; 2 TABLET SUBLINGUAL at 20:32

## 2017-04-20 RX ADMIN — Medication SCH ML: at 16:40

## 2017-04-20 RX ADMIN — VANCOMYCIN HYDROCHLORIDE SCH MLS/HR: 1 INJECTION, POWDER, LYOPHILIZED, FOR SOLUTION INTRAVENOUS at 10:10

## 2017-04-20 RX ADMIN — ATENOLOL SCH MG: 50 TABLET ORAL at 07:54

## 2017-04-20 RX ADMIN — Medication SCH CAP: at 20:41

## 2017-04-20 RX ADMIN — CEFEPIME HYDROCHLORIDE SCH MLS/HR: 2 INJECTION, POWDER, FOR SOLUTION INTRAVENOUS at 15:01

## 2017-04-20 RX ADMIN — INSULIN LISPRO SCH UNITS: 100 INJECTION, SOLUTION INTRAVENOUS; SUBCUTANEOUS at 07:55

## 2017-04-20 RX ADMIN — KETOROLAC TROMETHAMINE PRN MG: 30 INJECTION, SOLUTION INTRAMUSCULAR; INTRAVENOUS at 02:40

## 2017-04-20 RX ADMIN — HEPARIN SODIUM SCH UNITS: 5000 INJECTION INTRAVENOUS; SUBCUTANEOUS at 12:46

## 2017-04-20 RX ADMIN — INSULIN GLARGINE SCH UNITS: 100 INJECTION, SOLUTION SUBCUTANEOUS at 09:44

## 2017-04-20 RX ADMIN — THERA TABS SCH TAB: TAB at 07:54

## 2017-04-20 RX ADMIN — ONDANSETRON PRN MG: 2 INJECTION INTRAMUSCULAR; INTRAVENOUS at 15:01

## 2017-04-20 RX ADMIN — CEFEPIME HYDROCHLORIDE SCH MLS/HR: 2 INJECTION, POWDER, FOR SOLUTION INTRAVENOUS at 04:30

## 2017-04-20 RX ADMIN — INSULIN LISPRO SCH UNITS: 100 INJECTION, SOLUTION INTRAVENOUS; SUBCUTANEOUS at 16:56

## 2017-04-20 RX ADMIN — CALCIUM CARBONATE (ANTACID) CHEW TAB 500 MG PRN MG: 500 CHEW TAB at 15:01

## 2017-04-20 RX ADMIN — HEPARIN SODIUM SCH UNITS: 5000 INJECTION INTRAVENOUS; SUBCUTANEOUS at 05:40

## 2017-04-20 RX ADMIN — OXYCODONE HYDROCHLORIDE AND ACETAMINOPHEN PRN TAB: 5; 325 TABLET ORAL at 09:45

## 2017-04-20 RX ADMIN — INSULIN LISPRO SCH UNITS: 100 INJECTION, SOLUTION INTRAVENOUS; SUBCUTANEOUS at 12:46

## 2017-04-20 RX ADMIN — LISINOPRIL SCH MG: 10 TABLET ORAL at 07:53

## 2017-04-20 RX ADMIN — KETOROLAC TROMETHAMINE PRN MG: 30 INJECTION, SOLUTION INTRAMUSCULAR; INTRAVENOUS at 22:47

## 2017-04-20 RX ADMIN — OXYCODONE HYDROCHLORIDE AND ACETAMINOPHEN PRN TAB: 5; 325 TABLET ORAL at 14:52

## 2017-04-20 RX ADMIN — CHOLECALCIFEROL TAB 10 MCG (400 UNIT) SCH UNIT: 10 TAB at 07:54

## 2017-04-20 RX ADMIN — KETOROLAC TROMETHAMINE PRN MG: 30 INJECTION, SOLUTION INTRAMUSCULAR; INTRAVENOUS at 15:00

## 2017-04-20 RX ADMIN — VANCOMYCIN HYDROCHLORIDE SCH MLS/HR: 1 INJECTION, POWDER, LYOPHILIZED, FOR SOLUTION INTRAVENOUS at 02:30

## 2017-04-20 RX ADMIN — OXYCODONE HYDROCHLORIDE AND ACETAMINOPHEN PRN TAB: 5; 325 TABLET ORAL at 05:26

## 2017-04-20 RX ADMIN — INSULIN GLARGINE SCH UNITS: 100 INJECTION, SOLUTION SUBCUTANEOUS at 20:34

## 2017-04-20 RX ADMIN — OXYCODONE HYDROCHLORIDE AND ACETAMINOPHEN PRN TAB: 5; 325 TABLET ORAL at 20:32

## 2017-04-20 RX ADMIN — OMEPRAZOLE SCH MG: 20 CAPSULE, DELAYED RELEASE ORAL at 07:55

## 2017-04-20 RX ADMIN — Medication SCH CAP: at 09:45

## 2017-04-20 RX ADMIN — CALCIUM CARBONATE (ANTACID) CHEW TAB 500 MG PRN MG: 500 CHEW TAB at 02:00

## 2017-04-20 RX ADMIN — Medication SCH TAB: at 09:45

## 2017-04-20 RX ADMIN — ATORVASTATIN CALCIUM SCH MG: 80 TABLET, FILM COATED ORAL at 07:54

## 2017-04-20 RX ADMIN — VANCOMYCIN HYDROCHLORIDE SCH MLS/HR: 1 INJECTION, POWDER, LYOPHILIZED, FOR SOLUTION INTRAVENOUS at 17:22

## 2017-04-20 RX ADMIN — BUPRENORPHINE AND NALOXONE SCH TAB.SL: 8; 2 TABLET SUBLINGUAL at 07:54

## 2017-04-20 NOTE — RAD
Indication: PICC line placement.



Single frontal view of the chest performed at 1405 hours was reviewed.



Comparison is made with previous exam dated April 17, 2017.



Cardiomegaly is noted. Interstitial edema and hyperinflated lung fields are noted. No

alveolar consolidation is noted. PICC line tip is in the superior vena cava.



IMPRESSION: HYPERINFLATED LUNG COBURN WITHOUT EVIDENCE OF ACTIVE CARDIOPULMONARY DISEASE.

PICC LINE IS IN THE SUPERIOR VENA CAVA

## 2017-04-20 NOTE — PN
Subjective


Date of Service: 04/20/17


Interval History: 








Patient reports he  feels "much, much better and has a lot less pain". Reports 

less LE swelling. Denies fever or chills. No SOB or CP. Reports good appetite. 

No N/V/D or rash noted. 











Objective


Active Medications: 








Acetaminophen (Tylenol Tab*)  650 mg PO Q4H PRN


   PRN Reason: FEVER/PAIN


Albuterol (Ventolin 2.5 Mg/3 Ml Neb.Sol*)  2.5 mg INH Q2H PRN


   PRN Reason: SOB/WHEEZING


Atenolol (Tenormin Tab*)  50 mg PO QAM [facility]


   Last Admin: 04/20/17 07:54 Dose:  50 mg


Atorvastatin Calcium (Lipitor*)  80 mg PO DAILY [facility]


   Last Admin: 04/20/17 07:54 Dose:  80 mg


Buprenorphine/Naloxone (Suboxone 8-2 Mg Sl Tab*)  0.5 tab.sl SL BID [facility]


   Last Admin: 04/20/17 07:54 Dose:  0.5 tab.sl


Calcium Carbonate (Tums*)  1,000 mg PO Q4H PRN


   PRN Reason: INDIGESTION


   Last Admin: 04/20/17 05:40 Dose:  1,000 mg


Cholecalciferol (Vitamin D Tab*)  400 unit PO DAILY [facility]


   Last Admin: 04/20/17 07:54 Dose:  400 unit


Dextrose (D50w Syringe 50 Ml*)  12.5 gm IV PUSH .FOR FS < 60 - SS PRN


   PRN Reason: FS < 60


Heparin Sodium (Porcine) (Heparin Vial(*))  5,000 units SUBCUT Q8HR [facility]


   Last Admin: 04/20/17 05:40 Dose:  5,000 units


Cefepime HCl 2 gm/ Sodium (Chloride)  50 mls @ 100 mls/hr IVPB 0300,1500 [facility]


   Last Admin: 04/20/17 04:30 Dose:  100 mls/hr


Vancomycin HCl 1,250 mg/ (Sodium Chloride)  250 mls @ 166.667 mls/hr IVPB Q8H 

[facility]


   Last Admin: 04/20/17 02:30 Dose:  166.667 mls/hr


Insulin Glargine (Lantus(*))  45 units SUBCUT BID [facility]


   Last Admin: 04/19/17 21:22 Dose:  45 units


Insulin Human Lispro (Humalog*)  0 units SUBCUT AC [facility]


   PRN Reason: Protocol


   Last Admin: 04/20/17 07:55 Dose:  9 units


Ketorolac Tromethamine (Toradol Inj*)  30 mg IV PUSH Q6H PRN


   PRN Reason: PAIN


   Last Admin: 04/20/17 02:40 Dose:  30 mg


Lactobacillus Rhamnosus (Culturelle*)  1 cap PO BID [facility]


   Last Admin: 04/19/17 21:23 Dose:  1 cap


Lisinopril (Prinivil Tab*)  30 mg PO DAILY [facility]


   Last Admin: 04/20/17 07:53 Dose:  30 mg


Multivitamins/Minerals (Theragran/Minerals Tab*)  1 tab PO DAILY [facility]


   Last Admin: 04/20/17 07:54 Dose:  1 tab


Omeprazole (Prilosec Cap*)  20 mg PO DAILY@0730 [facility]


   Last Admin: 04/20/17 07:55 Dose:  20 mg


Ondansetron HCl (Zofran Inj*)  4 mg IV Q6H PRN


   PRN Reason: NAUSEA


   Last Admin: 04/19/17 19:54 Dose:  4 mg


Oxycodone/Acetaminophen (Percocet 5/325 Tab*)  2 tab PO Q4H PRN


   PRN Reason: PAIN


   Last Admin: 04/20/17 05:26 Dose:  2 tab


Pharmacy Consult (Vancomycin Per Pharmacy*)  1 note FOLLOW UP . PRN


   PRN Reason: PER PROTOCOL


Pharmacy Profile Note (Vancomycin Trough Check)  1 note FOLLOW UP .ENTER TIME 

ONE


   Stop: 04/21/17 09:01


Prochlorperazine Edisylate (Compazine Inj*)  5 mg IV Q6H PRN


   PRN Reason: NAUSEA/VOMITING


   Last Admin: 04/18/17 22:49 Dose:  5 mg


Vitamin B Complex/Vitamin E (Complex B-100*)  1 tab PO DAILY [facility]

















  04/19/17 04/19/17 04/19/17





  22:54 23:21 23:22


 


Temperature   97.9 F


 


Pulse Rate   59


 


Respiratory 16 16 16





Rate   


 


Blood Pressure   129/78





(mmHg)   


 


O2 Sat by Pulse   96





Oximetry   














  04/19/17 04/20/17 04/20/17





  23:47 00:28 01:47


 


Temperature   


 


Pulse Rate   


 


Respiratory 16  16





Rate   


 


Blood Pressure   





(mmHg)   


 


O2 Sat by Pulse  97 





Oximetry   














  04/20/17 04/20/17 04/20/17





  05:26 07:29 07:54


 


Temperature  98.1 F 


 


Pulse Rate  59 


 


Respiratory 16 18 19





Rate   


 


Blood Pressure  143/83 





(mmHg)   


 


O2 Sat by Pulse  96 





Oximetry   











Oxygen Devices in Use Now: None


Appearance: morbidly obese male sitting up in a chair in NAD. A+O x3


Eyes: No Scleral Icterus, PERRLA


Ears/Nose/Mouth/Throat: NL Teeth, Lips, Gums, Mucous Membranes Moist


Neck: NL Appearance and Movements; NL JVP


Respiratory: Symmetrical Chest Expansion and Respiratory Effort, Clear to 

Auscultation


Cardiovascular: NL Sounds; No Murmurs; No JVD, RRR


Abdominal: NL Sounds; No Tenderness; No Distention, - - obese, soft, nontender


Extremities: No Clubbing, Cyanosis


Skin: - - BLE are wrapped with CD+I dressing - just evaluated by ID - did not 

take off dressings


Neurological: Alert and Oriented x 3, NL Sensation, NL Muscle Strength and Tone


Lines/Tubes/Other Access: Clean, Dry and Intact Peripheral IV


Nutrition: Taking PO's


Result Diagrams: 


 04/20/17 06:04





 04/20/17 06:04


Additional Lab and Data: 


 Lab Results











  04/17/17 04/17/17 04/17/17 Range/Units





  10:10 10:29 10:29 


 


WBC   9.4   (3.5-10.8)  10^3/ul


 


RBC   4.42   (4.0-5.4)  10^6/ul


 


Hgb   11.7 L   (14.0-18.0)  g/dl


 


Hct   36 L   (42-52)  %


 


MCV   82   (80-94)  fL


 


MCH   27   (27-31)  pg


 


MCHC   33   (31-36)  g/dl


 


RDW   16 H   (10.5-15)  %


 


Plt Count   326   (150-450)  10^3/ul


 


MPV   8   (7.4-10.4)  um3


 


Neut % (Auto)   74.1   (38-83)  %


 


Lymph % (Auto)   13.5 L   (25-47)  %


 


Mono % (Auto)   10.0 H   (1-9)  %


 


Eos % (Auto)   1.9   (0-6)  %


 


Baso % (Auto)   0.5   (0-2)  %


 


Absolute Neuts (auto)   7.0   (1.5-7.7)  10^3/ul


 


Absolute Lymphs (auto)   1.3   (1.0-4.8)  10^3/ul


 


Absolute Monos (auto)   0.9 H   (0-0.8)  10^3/ul


 


Absolute Eos (auto)   0.2   (0-0.6)  10^3/ul


 


Absolute Basos (auto)   0   (0-0.2)  10^3/ul


 


Absolute Nucleated RBC   0   10^3/ul


 


Nucleated RBC %   0   


 


ESR   94 H   (0-20)  mm/Hr


 


INR (Anticoag Therapy)    0.98  (0.89-1.11)  


 


APTT    30.6  (26.0-36.3)  seconds


 


Sodium     (133-145)  mmol/L


 


Potassium     (3.5-5.0)  mmol/L


 


Chloride     (101-111)  mmol/L


 


Carbon Dioxide     (22-32)  mmol/L


 


Anion Gap     (2-11)  mmol/L


 


BUN     (6-24)  mg/dL


 


Creatinine     (0.67-1.17)  mg/dL


 


Est GFR ( Amer)     (>60)  


 


Est GFR (Non-Af Amer)     (>60)  


 


BUN/Creatinine Ratio     (8-20)  


 


Glucose     ()  mg/dL


 


Lactic Acid     (0.5-2.0)  mmol/L


 


Calcium     (8.6-10.3)  mg/dL


 


Total Bilirubin     (0.2-1.0)  mg/dL


 


AST     (13-39)  U/L


 


ALT     (7-52)  U/L


 


Alkaline Phosphatase     ()  U/L


 


Total Creatine Kinase     ()  U/L


 


Troponin I     (<0.04)  ng/mL


 


C-Reactive Protein     (< 5.00)  mg/L


 


Total Protein     (6.4-8.9)  g/dL


 


Albumin     (3.2-5.2)  g/dL


 


Globulin     (2-4)  g/dL


 


Albumin/Globulin Ratio     (1-3)  


 


Urine Color  Straw    


 


Urine Appearance  Clear    


 


Urine pH  7.0    (5-9)  


 


Ur Specific Gravity  1.008 L    (1.010-1.030)  


 


Urine Protein  Negative    (Negative)  


 


Urine Ketones  Negative    (Negative)  


 


Urine Blood  Negative    (Negative)  


 


Urine Nitrate  Negative    (Negative)  


 


Urine Bilirubin  Negative    (Negative)  


 


Urine Urobilinogen  Negative    (Negative)  


 


Ur Leukocyte Esterase  Negative    (Negative)  


 


Urine Glucose  Negative    (Negative)  














  04/17/17 04/17/17 Range/Units





  10:29 10:29 


 


WBC    (3.5-10.8)  10^3/ul


 


RBC    (4.0-5.4)  10^6/ul


 


Hgb    (14.0-18.0)  g/dl


 


Hct    (42-52)  %


 


MCV    (80-94)  fL


 


MCH    (27-31)  pg


 


MCHC    (31-36)  g/dl


 


RDW    (10.5-15)  %


 


Plt Count    (150-450)  10^3/ul


 


MPV    (7.4-10.4)  um3


 


Neut % (Auto)    (38-83)  %


 


Lymph % (Auto)    (25-47)  %


 


Mono % (Auto)    (1-9)  %


 


Eos % (Auto)    (0-6)  %


 


Baso % (Auto)    (0-2)  %


 


Absolute Neuts (auto)    (1.5-7.7)  10^3/ul


 


Absolute Lymphs (auto)    (1.0-4.8)  10^3/ul


 


Absolute Monos (auto)    (0-0.8)  10^3/ul


 


Absolute Eos (auto)    (0-0.6)  10^3/ul


 


Absolute Basos (auto)    (0-0.2)  10^3/ul


 


Absolute Nucleated RBC    10^3/ul


 


Nucleated RBC %    


 


ESR    (0-20)  mm/Hr


 


INR (Anticoag Therapy)    (0.89-1.11)  


 


APTT    (26.0-36.3)  seconds


 


Sodium  136   (133-145)  mmol/L


 


Potassium  3.4 L   (3.5-5.0)  mmol/L


 


Chloride  94 L   (101-111)  mmol/L


 


Carbon Dioxide  38 H   (22-32)  mmol/L


 


Anion Gap  4   (2-11)  mmol/L


 


BUN  17   (6-24)  mg/dL


 


Creatinine  0.81   (0.67-1.17)  mg/dL


 


Est GFR ( Amer)  125.4   (>60)  


 


Est GFR (Non-Af Amer)  97.5   (>60)  


 


BUN/Creatinine Ratio  21.0 H   (8-20)  


 


Glucose  61 L   ()  mg/dL


 


Lactic Acid   1.4  (0.5-2.0)  mmol/L


 


Calcium  9.7   (8.6-10.3)  mg/dL


 


Total Bilirubin  0.40   (0.2-1.0)  mg/dL


 


AST  10 L   (13-39)  U/L


 


ALT  13   (7-52)  U/L


 


Alkaline Phosphatase  77   ()  U/L


 


Total Creatine Kinase  37   ()  U/L


 


Troponin I  0.01   (<0.04)  ng/mL


 


C-Reactive Protein  43.41 H   (< 5.00)  mg/L


 


Total Protein  8.1   (6.4-8.9)  g/dL


 


Albumin  3.8   (3.2-5.2)  g/dL


 


Globulin  4.3 H   (2-4)  g/dL


 


Albumin/Globulin Ratio  0.9 L   (1-3)  


 


Urine Color    


 


Urine Appearance    


 


Urine pH    (5-9)  


 


Ur Specific Gravity    (1.010-1.030)  


 


Urine Protein    (Negative)  


 


Urine Ketones    (Negative)  


 


Urine Blood    (Negative)  


 


Urine Nitrate    (Negative)  


 


Urine Bilirubin    (Negative)  


 


Urine Urobilinogen    (Negative)  


 


Ur Leukocyte Esterase    (Negative)  


 


Urine Glucose    (Negative)  














Assess/Plan/Problems-Billing


Assessment: 62 yo male with a PMH of morbid obesity s/p sleeve gastrectomy, 

Diabetes, COPD, lymphedema, pre-existing L heel ulcer with osteo for which he 

recently finished 6 weeks of IV abx followed by Dr. Mejia (off abx for 2-3 

weeks) also followed by wound clinic who started him on Doxy 2 days ago who 

presented 4/17 with increased LLE redness and pain sent from wound clinic











- Patient Problems


(1) Cellulitis


Comment: 


- with chronic osteomyelitis and non-healing DM ulcer - recent 6 weeks of IV abx

, followed by ID and wound clinic as outpt. 


- Surgery consulted stating no need for wound debridment at this time. 


- wound cx growing MRSA, enterobacter & enterococcus . Blood cx negative


- c/w vanco, cefepime


- ID following. Plan for PICC with plan to send home with Vanco for a few 

weeks.    





(2) Chronic pain


Comment: 


- suboxone started a few months ago per pt pain clinic has been weaning down 

narcotics. 


- Patient continues to report a lot of pain. Continue Pecocet prn. 


   





(3) HTN (hypertension)


Comment: 


controlled


c/w lisinopril,  Atenolol.   





(4) Type 2 diabetes mellitus


Comment: 


Continues to have high blood sugars, slightly improved today


Increased Lantus 50 units BID and lantus sliding scale    





(5) History of hypertension


Comment: 


- continue lisinopril, atenolol    





(6) Lymphedema


Comment: 


- ace wraps and elevation to LE's   





(7) Full code status








(8) DVT prophylaxis


Comment: 


HSQ   


Status and Disposition: 





inpatient requiring IV antibiotics. Estimated LOS > 2 days.

## 2017-04-20 NOTE — PN
Progress Note





- Progress Note


SOAP: 


Subjective:


DOS: 4/20/17


CC: left leg infection


HPI: 61 year old man with chronic left calcaneous ulcer,recently treated for 

osteomyelitis, now with heel pain up to calf with diffuse erythema and 

swelling.  Improved on antibiotics, pain, redness, swelling better.  No fever, 

rash, or diarrhea.  








Objective:


[]


 Vital Signs











Temp  36.7 C   04/20/17 07:29


 


Pulse  59   04/20/17 07:29


 


Resp  20   04/20/17 10:14


 


BP  143/83   04/20/17 07:29


 


Pulse Ox  96   04/20/17 07:29








 Intake & Output











 04/19/17 04/20/17 04/20/17





 18:59 06:59 18:59


 


Intake Total 3160 2337 350


 


Output Total 400 2100 


 


Balance 2760 237 350


 


Intake:   


 


  IV Fluids  1035 


 


    NS (0.9%)  1035 


 


  IVPB 300 722 


 


    ABX - CEFEPIME 50  


 


    ABX - VANCOMYCIN 250  


 


    NS (0.9%)  722 


 


  Oral 2860 580 350


 


Output:   


 


  Urine 400 2100 


 


Other:   


 


  Estimated Void Medium Medium 


 


  # Bowel Movements  0 


 


  Estimated Stool Amount  Large 


 


  # Voids  1 








Gen:awake, no distress


HEENT:PERRL, MMM


Neck:supple


Heart:RRR no murmur


Lungs:CTA BL


Abd:+BS NTND soft


Skin: no rash


MSK: left heel ulcer, erythema decreased in intensity up the leg and receded 

from thigh


BL non pitting edema


 Laboratory Results - last 24 hr











  04/19/17 04/19/17 04/19/17





  12:22 12:43 16:58


 


WBC   


 


RBC   


 


Hgb   


 


Hct   


 


MCV   


 


MCH   


 


MCHC   


 


RDW   


 


Plt Count   


 


MPV   


 


Neut % (Auto)   


 


Lymph % (Auto)   


 


Mono % (Auto)   


 


Eos % (Auto)   


 


Baso % (Auto)   


 


Absolute Neuts (auto)   


 


Absolute Lymphs (auto)   


 


Absolute Monos (auto)   


 


Absolute Eos (auto)   


 


Absolute Basos (auto)   


 


Absolute Nucleated RBC   


 


Nucleated RBC %   


 


Sodium   


 


Potassium   


 


Chloride   


 


Carbon Dioxide   


 


Anion Gap   


 


BUN   


 


Creatinine   


 


Est GFR ( Amer)   


 


Est GFR (Non-Af Amer)   


 


BUN/Creatinine Ratio   


 


Glucose   356 H 


 


POC Glucose (mg/dL)  414 H*   335 H


 


Calcium   














  04/19/17 04/20/17 04/20/17





  21:11 06:04 06:04


 


WBC   6.0 


 


RBC   4.07 


 


Hgb   10.8 L 


 


Hct   34 L 


 


MCV   83 


 


MCH   27 


 


MCHC   32 


 


RDW   16 H 


 


Plt Count   227 


 


MPV   8 


 


Neut % (Auto)   72.6 


 


Lymph % (Auto)   11.1 L 


 


Mono % (Auto)   9.8 H 


 


Eos % (Auto)   5.9 


 


Baso % (Auto)   0.6 


 


Absolute Neuts (auto)   4.4 


 


Absolute Lymphs (auto)   0.7 L 


 


Absolute Monos (auto)   0.6 


 


Absolute Eos (auto)   0.4 


 


Absolute Basos (auto)   0 


 


Absolute Nucleated RBC   0.01 


 


Nucleated RBC %   0.2 


 


Sodium    132 L


 


Potassium    4.8


 


Chloride    96 L


 


Carbon Dioxide    30


 


Anion Gap    6


 


BUN    26 H


 


Creatinine    0.95


 


Est GFR ( Amer)    104.4


 


Est GFR (Non-Af Amer)    81.1


 


BUN/Creatinine Ratio    27.4 H


 


Glucose    175 H


 


POC Glucose (mg/dL)  259 H  


 


Calcium    8.8














  04/20/17





  07:32


 


WBC 


 


RBC 


 


Hgb 


 


Hct 


 


MCV 


 


MCH 


 


MCHC 


 


RDW 


 


Plt Count 


 


MPV 


 


Neut % (Auto) 


 


Lymph % (Auto) 


 


Mono % (Auto) 


 


Eos % (Auto) 


 


Baso % (Auto) 


 


Absolute Neuts (auto) 


 


Absolute Lymphs (auto) 


 


Absolute Monos (auto) 


 


Absolute Eos (auto) 


 


Absolute Basos (auto) 


 


Absolute Nucleated RBC 


 


Nucleated RBC % 


 


Sodium 


 


Potassium 


 


Chloride 


 


Carbon Dioxide 


 


Anion Gap 


 


BUN 


 


Creatinine 


 


Est GFR ( Amer) 


 


Est GFR (Non-Af Amer) 


 


BUN/Creatinine Ratio 


 


Glucose 


 


POC Glucose (mg/dL)  261 H


 


Calcium 

















Assessment:


1. polymicrobial wound infection and cellulitis, left lower leg; improving


2. chronic left calcaneal ulcer non pressure related


3. diabetes


4. morbid obesity


5. bilateral LE lymphedema





Plan:


1. continue vancomycin goal tr 10-15, cefepime; orders written for 2 more weeks 

of vancomycin at home with weekly lab testing, in addition to cipro 750 mg po 

bid x14 days.  


Discussed with Kassandra Byrd NP


35 minutes floor time >50% face to face time in counseling regarding next steps 

in abx treatment, side effect monitoring, lymphedema management.

## 2017-04-21 RX ADMIN — HEPARIN SODIUM SCH UNITS: 5000 INJECTION INTRAVENOUS; SUBCUTANEOUS at 07:26

## 2017-04-21 RX ADMIN — INSULIN LISPRO SCH UNIT: 100 INJECTION, SOLUTION INTRAVENOUS; SUBCUTANEOUS at 17:23

## 2017-04-21 RX ADMIN — OXYCODONE HYDROCHLORIDE AND ACETAMINOPHEN PRN TAB: 5; 325 TABLET ORAL at 07:28

## 2017-04-21 RX ADMIN — VANCOMYCIN HYDROCHLORIDE SCH MLS/HR: 1 INJECTION, POWDER, LYOPHILIZED, FOR SOLUTION INTRAVENOUS at 02:01

## 2017-04-21 RX ADMIN — ACETAMINOPHEN SCH MG: 325 TABLET ORAL at 17:23

## 2017-04-21 RX ADMIN — HEPARIN SODIUM SCH UNITS: 5000 INJECTION INTRAVENOUS; SUBCUTANEOUS at 14:45

## 2017-04-21 RX ADMIN — BUPRENORPHINE AND NALOXONE SCH TAB.SL: 8; 2 TABLET SUBLINGUAL at 20:22

## 2017-04-21 RX ADMIN — CALCIUM CARBONATE (ANTACID) CHEW TAB 500 MG PRN MG: 500 CHEW TAB at 07:28

## 2017-04-21 RX ADMIN — INSULIN LISPRO SCH UNITS: 100 INJECTION, SOLUTION INTRAVENOUS; SUBCUTANEOUS at 12:13

## 2017-04-21 RX ADMIN — CALCIUM CARBONATE (ANTACID) CHEW TAB 500 MG PRN MG: 500 CHEW TAB at 21:57

## 2017-04-21 RX ADMIN — INSULIN GLARGINE SCH UNITS: 100 INJECTION, SOLUTION SUBCUTANEOUS at 20:20

## 2017-04-21 RX ADMIN — OMEPRAZOLE SCH MG: 20 CAPSULE, DELAYED RELEASE ORAL at 07:28

## 2017-04-21 RX ADMIN — VANCOMYCIN HYDROCHLORIDE SCH MLS/HR: 1 INJECTION, POWDER, LYOPHILIZED, FOR SOLUTION INTRAVENOUS at 10:34

## 2017-04-21 RX ADMIN — THERA TABS SCH TAB: TAB at 08:53

## 2017-04-21 RX ADMIN — BUPRENORPHINE AND NALOXONE SCH TAB.SL: 8; 2 TABLET SUBLINGUAL at 08:53

## 2017-04-21 RX ADMIN — Medication SCH TAB: at 07:25

## 2017-04-21 RX ADMIN — ATENOLOL SCH MG: 50 TABLET ORAL at 08:53

## 2017-04-21 RX ADMIN — INSULIN GLARGINE SCH UNITS: 100 INJECTION, SOLUTION SUBCUTANEOUS at 08:54

## 2017-04-21 RX ADMIN — INSULIN LISPRO SCH: 100 INJECTION, SOLUTION INTRAVENOUS; SUBCUTANEOUS at 20:25

## 2017-04-21 RX ADMIN — CHOLECALCIFEROL TAB 10 MCG (400 UNIT) SCH UNIT: 10 TAB at 08:53

## 2017-04-21 RX ADMIN — HEPARIN SODIUM SCH UNITS: 5000 INJECTION INTRAVENOUS; SUBCUTANEOUS at 21:58

## 2017-04-21 RX ADMIN — OXYCODONE HYDROCHLORIDE PRN MG: 5 CAPSULE ORAL at 16:27

## 2017-04-21 RX ADMIN — OXYCODONE HYDROCHLORIDE PRN MG: 5 CAPSULE ORAL at 21:57

## 2017-04-21 RX ADMIN — ACETAMINOPHEN SCH MG: 325 TABLET ORAL at 14:45

## 2017-04-21 RX ADMIN — INSULIN LISPRO SCH UNIT: 100 INJECTION, SOLUTION INTRAVENOUS; SUBCUTANEOUS at 20:21

## 2017-04-21 RX ADMIN — VANCOMYCIN HYDROCHLORIDE SCH MLS/HR: 1 INJECTION, POWDER, LYOPHILIZED, FOR SOLUTION INTRAVENOUS at 17:24

## 2017-04-21 RX ADMIN — Medication SCH CAP: at 07:24

## 2017-04-21 RX ADMIN — Medication SCH: at 17:24

## 2017-04-21 RX ADMIN — ATORVASTATIN CALCIUM SCH MG: 80 TABLET, FILM COATED ORAL at 08:53

## 2017-04-21 RX ADMIN — CEFEPIME HYDROCHLORIDE SCH MLS/HR: 2 INJECTION, POWDER, FOR SOLUTION INTRAVENOUS at 14:46

## 2017-04-21 RX ADMIN — CEFEPIME HYDROCHLORIDE SCH MLS/HR: 2 INJECTION, POWDER, FOR SOLUTION INTRAVENOUS at 03:48

## 2017-04-21 RX ADMIN — INSULIN LISPRO SCH UNITS: 100 INJECTION, SOLUTION INTRAVENOUS; SUBCUTANEOUS at 08:54

## 2017-04-21 RX ADMIN — OXYCODONE HYDROCHLORIDE AND ACETAMINOPHEN PRN TAB: 5; 325 TABLET ORAL at 12:05

## 2017-04-21 RX ADMIN — Medication SCH CAP: at 20:22

## 2017-04-21 RX ADMIN — INSULIN LISPRO SCH UNITS: 100 INJECTION, SOLUTION INTRAVENOUS; SUBCUTANEOUS at 17:23

## 2017-04-21 RX ADMIN — Medication SCH ML: at 07:32

## 2017-04-21 RX ADMIN — ACETAMINOPHEN SCH MG: 325 TABLET ORAL at 21:57

## 2017-04-21 RX ADMIN — LISINOPRIL SCH MG: 10 TABLET ORAL at 08:53

## 2017-04-21 NOTE — PN
Progress Note





- Progress Note


SOAP: 


Subjective:





Doing much better today, ambulatory. Denies lower extremities pain or welling. 

Seen by ID consultations, plan for him to go home soon on Vanco. Denies fever 

or chills. 








Objective:





Awake and alert, up walking around, in NAD.


VSS, afebrile


Lower extremities ACE wrap appears clean and dry. 








Assessment:





A 60 y/o male with chronic LLE wounds.








Plan:





Patient is likely to be discharged home today. Had a PICC line placed and will 

be going home on IV Vanco. He will also F/U with wound clinic as an outpatient 

for his chronic LE wounds.


At this point, no surgical interventions needed, will sign off patient.


Please contact surgical services if any issues arise.

## 2017-04-21 NOTE — PN
Subjective


Date of Service: 04/21/17


Interval History: 








Patient reports he is feeling better everyday. He continues to c/o LE pain and 

is asking for pain medication at discharge but states overall he is feeling 

much better. I explained to the patient that since he is on suboxone the goal 

will be to wean him off the narcotics he has been receiving, Pt agrees with 

this plan at this time. 





He denies CP or SOB. No fevers or chills. Reports mild nausea but states this 

is common due to his gastric sleeve. No vomiting. Denies diarrhea. 





 





Objective


Active Medications: 








Acetaminophen (Tylenol Tab*)  650 mg PO Q4H PRN


   PRN Reason: FEVER/PAIN


Albuterol (Ventolin 2.5 Mg/3 Ml Neb.Sol*)  2.5 mg INH Q2H PRN


   PRN Reason: SOB/WHEEZING


Atenolol (Tenormin Tab*)  50 mg PO QAM [facility]


   Last Admin: 04/21/17 08:53 Dose:  50 mg


Atorvastatin Calcium (Lipitor*)  80 mg PO DAILY [facility]


   Last Admin: 04/21/17 08:53 Dose:  80 mg


Buprenorphine/Naloxone (Suboxone 8-2 Mg Sl Tab*)  0.5 tab.sl SL BID [facility]


   Last Admin: 04/21/17 08:53 Dose:  0.5 tab.sl


Calcium Carbonate (Tums*)  1,000 mg PO Q4H PRN


   PRN Reason: INDIGESTION


   Last Admin: 04/21/17 07:28 Dose:  1,000 mg


Cholecalciferol (Vitamin D Tab*)  400 unit PO DAILY [facility]


   Last Admin: 04/21/17 08:53 Dose:  400 unit


Dextrose (D50w Syringe 50 Ml*)  12.5 gm IV PUSH .FOR FS < 60 - SS PRN


   PRN Reason: FS < 60


Dextrose (D50w Syringe 50 Ml*)  12.5 gm IV PUSH .FOR FS < 60 - SS PRN


   PRN Reason: FS < 60


Heparin Sodium (Porcine) (Heparin Vial(*))  5,000 units SUBCUT Q8HR [facility]


   Last Admin: 04/21/17 07:26 Dose:  5,000 units


Heparin Sodium (Porcine) (Heparin Flush Picc/Ml/Cvc(*))  1 - 3 ml FLUSH 0600,

1800 [facility]


   PRN Reason: Protocol


   Last Admin: 04/21/17 07:32 Dose:  1 ml


Cefepime HCl 2 gm/ Sodium (Chloride)  50 mls @ 100 mls/hr IVPB 0300,1500 [facility]


   Last Admin: 04/21/17 03:48 Dose:  100 mls/hr


Vancomycin HCl 1,250 mg/ (Sodium Chloride)  250 mls @ 166.667 mls/hr IVPB Q8H 

[facility]


   Last Admin: 04/21/17 10:34 Dose:  166.667 mls/hr


Insulin Glargine (Lantus(*))  50 units SUBCUT BID [facility]


   Last Admin: 04/21/17 08:54 Dose:  50 units


Insulin Human Lispro (Humalog*)  0 units SUBCUT ACHS [facility]


   PRN Reason: Protocol


Insulin Human Lispro (Humalog*)  0 units SUBCUT ACHS [facility]


   PRN Reason: Protocol


Ketorolac Tromethamine (Toradol Inj*)  30 mg IV PUSH Q6H PRN


   PRN Reason: PAIN


   Last Admin: 04/20/17 22:47 Dose:  30 mg


Lactobacillus Rhamnosus (Culturelle*)  1 cap PO BID [facility]


   Last Admin: 04/21/17 07:24 Dose:  1 cap


Lisinopril (Prinivil Tab*)  30 mg PO DAILY [facility]


   Last Admin: 04/21/17 08:53 Dose:  30 mg


Multivitamins/Minerals (Theragran/Minerals Tab*)  1 tab PO DAILY [facility]


   Last Admin: 04/21/17 08:53 Dose:  1 tab


Omeprazole (Prilosec Cap*)  20 mg PO DAILY@0730 [facility]


   Last Admin: 04/21/17 07:28 Dose:  20 mg


Ondansetron HCl (Zofran Inj*)  4 mg IV Q6H PRN


   PRN Reason: NAUSEA


   Last Admin: 04/20/17 15:01 Dose:  4 mg


Oxycodone/Acetaminophen (Percocet 5/325 Tab*)  2 tab PO Q4H PRN


   PRN Reason: PAIN


   Last Admin: 04/21/17 12:05 Dose:  2 tab


Pharmacy Consult (Vancomycin Per Pharmacy*)  1 note FOLLOW UP . PRN


   PRN Reason: PER PROTOCOL


Prochlorperazine Edisylate (Compazine Inj*)  5 mg IV Q6H PRN


   PRN Reason: NAUSEA/VOMITING


   Last Admin: 04/18/17 22:49 Dose:  5 mg


Vitamin B Complex/Vitamin E (Complex B-100*)  1 tab PO DAILY [facility]


   Last Admin: 04/21/17 07:25 Dose:  1 tab








 Vital Signs











  04/20/17 04/20/17 04/20/17





  14:52 16:52 20:32


 


Temperature   


 


Pulse Rate   


 


Respiratory 14 18 18





Rate   


 


Blood Pressure   





(mmHg)   


 


O2 Sat by Pulse   





Oximetry   














  04/20/17 04/20/17 04/20/17





  20:45 22:32 23:10


 


Temperature   97.3 F


 


Pulse Rate   65


 


Respiratory 20 20 16





Rate   


 


Blood Pressure   166/87





(mmHg)   


 


O2 Sat by Pulse   96





Oximetry   














  04/21/17 04/21/17 04/21/17





  07:28 07:59 08:00


 


Temperature   


 


Pulse Rate  69 


 


Respiratory 18 18 20





Rate   


 


Blood Pressure  183/76 





(mmHg)   


 


O2 Sat by Pulse  94 





Oximetry   














  04/21/17 04/21/17 04/21/17





  08:01 08:44 08:53


 


Temperature   


 


Pulse Rate 66 68 


 


Respiratory  14 16





Rate   


 


Blood Pressure 170/68  





(mmHg)   


 


O2 Sat by Pulse  94 





Oximetry   














  04/21/17 04/21/17 04/21/17





  09:28 10:53 12:05


 


Temperature   


 


Pulse Rate   


 


Respiratory 18 18 18





Rate   


 


Blood Pressure   





(mmHg)   


 


O2 Sat by Pulse   





Oximetry   











Oxygen Devices in Use Now: None


Appearance: mornidly obese male standing in his room A+O x3 in NAD


Eyes: No Scleral Icterus, PERRLA


Ears/Nose/Mouth/Throat: NL Teeth, Lips, Gums, Mucous Membranes Moist


Neck: NL Appearance and Movements; NL JVP


Respiratory: Symmetrical Chest Expansion and Respiratory Effort, Clear to 

Auscultation


Cardiovascular: NL Sounds; No Murmurs; No JVD, RRR, - - 2-3+ b/l edema


Abdominal: - - obese distended soft non tender


Extremities: No Clubbing, Cyanosis, - - B/L LE appears less erythematous, no 

weeping noted


Neurological: Alert and Oriented x 3, NL Sensation


Lines/Tubes/Other Access: Clean, Dry and Intact PICC Line


Nutrition: Taking PO's


Result Diagrams: 


 04/20/17 06:04





 04/20/17 06:04


Additional Lab and Data: 


 Lab Results











  04/17/17 04/17/17 04/17/17 Range/Units





  10:10 10:29 10:29 


 


WBC   9.4   (3.5-10.8)  10^3/ul


 


RBC   4.42   (4.0-5.4)  10^6/ul


 


Hgb   11.7 L   (14.0-18.0)  g/dl


 


Hct   36 L   (42-52)  %


 


MCV   82   (80-94)  fL


 


MCH   27   (27-31)  pg


 


MCHC   33   (31-36)  g/dl


 


RDW   16 H   (10.5-15)  %


 


Plt Count   326   (150-450)  10^3/ul


 


MPV   8   (7.4-10.4)  um3


 


Neut % (Auto)   74.1   (38-83)  %


 


Lymph % (Auto)   13.5 L   (25-47)  %


 


Mono % (Auto)   10.0 H   (1-9)  %


 


Eos % (Auto)   1.9   (0-6)  %


 


Baso % (Auto)   0.5   (0-2)  %


 


Absolute Neuts (auto)   7.0   (1.5-7.7)  10^3/ul


 


Absolute Lymphs (auto)   1.3   (1.0-4.8)  10^3/ul


 


Absolute Monos (auto)   0.9 H   (0-0.8)  10^3/ul


 


Absolute Eos (auto)   0.2   (0-0.6)  10^3/ul


 


Absolute Basos (auto)   0   (0-0.2)  10^3/ul


 


Absolute Nucleated RBC   0   10^3/ul


 


Nucleated RBC %   0   


 


ESR   94 H   (0-20)  mm/Hr


 


INR (Anticoag Therapy)    0.98  (0.89-1.11)  


 


APTT    30.6  (26.0-36.3)  seconds


 


Sodium     (133-145)  mmol/L


 


Potassium     (3.5-5.0)  mmol/L


 


Chloride     (101-111)  mmol/L


 


Carbon Dioxide     (22-32)  mmol/L


 


Anion Gap     (2-11)  mmol/L


 


BUN     (6-24)  mg/dL


 


Creatinine     (0.67-1.17)  mg/dL


 


Est GFR ( Amer)     (>60)  


 


Est GFR (Non-Af Amer)     (>60)  


 


BUN/Creatinine Ratio     (8-20)  


 


Glucose     ()  mg/dL


 


Lactic Acid     (0.5-2.0)  mmol/L


 


Calcium     (8.6-10.3)  mg/dL


 


Total Bilirubin     (0.2-1.0)  mg/dL


 


AST     (13-39)  U/L


 


ALT     (7-52)  U/L


 


Alkaline Phosphatase     ()  U/L


 


Total Creatine Kinase     ()  U/L


 


Troponin I     (<0.04)  ng/mL


 


C-Reactive Protein     (< 5.00)  mg/L


 


Total Protein     (6.4-8.9)  g/dL


 


Albumin     (3.2-5.2)  g/dL


 


Globulin     (2-4)  g/dL


 


Albumin/Globulin Ratio     (1-3)  


 


Urine Color  Straw    


 


Urine Appearance  Clear    


 


Urine pH  7.0    (5-9)  


 


Ur Specific Gravity  1.008 L    (1.010-1.030)  


 


Urine Protein  Negative    (Negative)  


 


Urine Ketones  Negative    (Negative)  


 


Urine Blood  Negative    (Negative)  


 


Urine Nitrate  Negative    (Negative)  


 


Urine Bilirubin  Negative    (Negative)  


 


Urine Urobilinogen  Negative    (Negative)  


 


Ur Leukocyte Esterase  Negative    (Negative)  


 


Urine Glucose  Negative    (Negative)  














  04/17/17 04/17/17 Range/Units





  10:29 10:29 


 


WBC    (3.5-10.8)  10^3/ul


 


RBC    (4.0-5.4)  10^6/ul


 


Hgb    (14.0-18.0)  g/dl


 


Hct    (42-52)  %


 


MCV    (80-94)  fL


 


MCH    (27-31)  pg


 


MCHC    (31-36)  g/dl


 


RDW    (10.5-15)  %


 


Plt Count    (150-450)  10^3/ul


 


MPV    (7.4-10.4)  um3


 


Neut % (Auto)    (38-83)  %


 


Lymph % (Auto)    (25-47)  %


 


Mono % (Auto)    (1-9)  %


 


Eos % (Auto)    (0-6)  %


 


Baso % (Auto)    (0-2)  %


 


Absolute Neuts (auto)    (1.5-7.7)  10^3/ul


 


Absolute Lymphs (auto)    (1.0-4.8)  10^3/ul


 


Absolute Monos (auto)    (0-0.8)  10^3/ul


 


Absolute Eos (auto)    (0-0.6)  10^3/ul


 


Absolute Basos (auto)    (0-0.2)  10^3/ul


 


Absolute Nucleated RBC    10^3/ul


 


Nucleated RBC %    


 


ESR    (0-20)  mm/Hr


 


INR (Anticoag Therapy)    (0.89-1.11)  


 


APTT    (26.0-36.3)  seconds


 


Sodium  136   (133-145)  mmol/L


 


Potassium  3.4 L   (3.5-5.0)  mmol/L


 


Chloride  94 L   (101-111)  mmol/L


 


Carbon Dioxide  38 H   (22-32)  mmol/L


 


Anion Gap  4   (2-11)  mmol/L


 


BUN  17   (6-24)  mg/dL


 


Creatinine  0.81   (0.67-1.17)  mg/dL


 


Est GFR ( Amer)  125.4   (>60)  


 


Est GFR (Non-Af Amer)  97.5   (>60)  


 


BUN/Creatinine Ratio  21.0 H   (8-20)  


 


Glucose  61 L   ()  mg/dL


 


Lactic Acid   1.4  (0.5-2.0)  mmol/L


 


Calcium  9.7   (8.6-10.3)  mg/dL


 


Total Bilirubin  0.40   (0.2-1.0)  mg/dL


 


AST  10 L   (13-39)  U/L


 


ALT  13   (7-52)  U/L


 


Alkaline Phosphatase  77   ()  U/L


 


Total Creatine Kinase  37   ()  U/L


 


Troponin I  0.01   (<0.04)  ng/mL


 


C-Reactive Protein  43.41 H   (< 5.00)  mg/L


 


Total Protein  8.1   (6.4-8.9)  g/dL


 


Albumin  3.8   (3.2-5.2)  g/dL


 


Globulin  4.3 H   (2-4)  g/dL


 


Albumin/Globulin Ratio  0.9 L   (1-3)  


 


Urine Color    


 


Urine Appearance    


 


Urine pH    (5-9)  


 


Ur Specific Gravity    (1.010-1.030)  


 


Urine Protein    (Negative)  


 


Urine Ketones    (Negative)  


 


Urine Blood    (Negative)  


 


Urine Nitrate    (Negative)  


 


Urine Bilirubin    (Negative)  


 


Urine Urobilinogen    (Negative)  


 


Ur Leukocyte Esterase    (Negative)  


 


Urine Glucose    (Negative)  














Assess/Plan/Problems-Billing


Assessment: 62 yo male with a PMH of morbid obesity s/p sleeve gastrectomy, 

Diabetes, COPD, lymphedema, pre-existing L heel ulcer with osteo for which he 

recently finished 6 weeks of IV abx followed by Dr. Mejia (off abx for 2-3 

weeks) also followed by wound clinic who started him on Doxy 2 days ago who 

presented 4/17 with increased LLE redness and pain sent from wound clinic











- Patient Problems


(1) Cellulitis


Comment: 


- with chronic osteomyelitis and non-healing DM ulcer - recent 6 weeks of IV abx

, followed by ID and wound clinic as outpt. 


- Surgery consulted stating no need for wound debridment at this time. 


- wound cx growing MRSA, enterobacter & enterococcus . Blood cx negative


- c/w vanco, cefepime


- ID following.  PICC with plan to send home with Vanco for a few weeks. Plan 

for DC tomorrow   





(2) Chronic pain


Comment: 


- suboxone started a few months ago per pt pain clinic due to chronic opioid use





   





(3) HTN (hypertension)


Comment: 


mostly well controlled, a little high today - will continue to monitor.


c/w lisinopril,  Atenolol.   





(4) Type 2 diabetes mellitus


Comment: 


Continues to have high blood sugars


Continue Lantus 50 units BID and lantus sliding scale with addition of carb 

counting lispro scale   





(5) History of hypertension


Comment: 


- continue lisinopril, atenolol    





(6) Lymphedema


Comment: 


- ace wraps and elevation to LE's   





(7) Full code status








(8) DVT prophylaxis


Comment: 


HSQ   


Status and Disposition: 





inpatient requiring IV antibiotics. Plan for DC to home tomorrow

## 2017-04-22 VITALS — SYSTOLIC BLOOD PRESSURE: 157 MMHG | DIASTOLIC BLOOD PRESSURE: 91 MMHG

## 2017-04-22 RX ADMIN — CHOLECALCIFEROL TAB 10 MCG (400 UNIT) SCH UNIT: 10 TAB at 08:13

## 2017-04-22 RX ADMIN — ATORVASTATIN CALCIUM SCH MG: 80 TABLET, FILM COATED ORAL at 08:13

## 2017-04-22 RX ADMIN — VANCOMYCIN HYDROCHLORIDE SCH MLS/HR: 1 INJECTION, POWDER, LYOPHILIZED, FOR SOLUTION INTRAVENOUS at 08:51

## 2017-04-22 RX ADMIN — INSULIN LISPRO SCH UNIT: 100 INJECTION, SOLUTION INTRAVENOUS; SUBCUTANEOUS at 08:50

## 2017-04-22 RX ADMIN — VANCOMYCIN HYDROCHLORIDE SCH MLS/HR: 1 INJECTION, POWDER, LYOPHILIZED, FOR SOLUTION INTRAVENOUS at 01:07

## 2017-04-22 RX ADMIN — Medication SCH TAB: at 08:13

## 2017-04-22 RX ADMIN — CEFEPIME HYDROCHLORIDE SCH MLS/HR: 2 INJECTION, POWDER, FOR SOLUTION INTRAVENOUS at 03:08

## 2017-04-22 RX ADMIN — ATENOLOL SCH MG: 50 TABLET ORAL at 08:14

## 2017-04-22 RX ADMIN — Medication SCH ML: at 05:17

## 2017-04-22 RX ADMIN — THERA TABS SCH TAB: TAB at 08:14

## 2017-04-22 RX ADMIN — Medication SCH CAP: at 08:14

## 2017-04-22 RX ADMIN — INSULIN GLARGINE SCH UNITS: 100 INJECTION, SOLUTION SUBCUTANEOUS at 08:50

## 2017-04-22 RX ADMIN — INSULIN LISPRO SCH UNITS: 100 INJECTION, SOLUTION INTRAVENOUS; SUBCUTANEOUS at 08:50

## 2017-04-22 RX ADMIN — CALCIUM CARBONATE (ANTACID) CHEW TAB 500 MG PRN MG: 500 CHEW TAB at 09:34

## 2017-04-22 RX ADMIN — OXYCODONE HYDROCHLORIDE PRN MG: 5 CAPSULE ORAL at 04:37

## 2017-04-22 RX ADMIN — ACETAMINOPHEN SCH MG: 325 TABLET ORAL at 05:19

## 2017-04-22 RX ADMIN — LISINOPRIL SCH MG: 10 TABLET ORAL at 08:14

## 2017-04-22 RX ADMIN — ACETAMINOPHEN SCH MG: 325 TABLET ORAL at 09:35

## 2017-04-22 RX ADMIN — ACETAMINOPHEN SCH MG: 325 TABLET ORAL at 02:07

## 2017-04-22 RX ADMIN — BUPRENORPHINE AND NALOXONE SCH TAB.SL: 8; 2 TABLET SUBLINGUAL at 08:13

## 2017-04-22 RX ADMIN — OMEPRAZOLE SCH MG: 20 CAPSULE, DELAYED RELEASE ORAL at 08:14

## 2017-04-22 RX ADMIN — HEPARIN SODIUM SCH UNITS: 5000 INJECTION INTRAVENOUS; SUBCUTANEOUS at 05:18

## 2017-04-22 NOTE — DS
DISCHARGE SUMMARY:

 

DATE OF ADMISSION:  04/17/17

 

DATE OF DISCHARGE:  04/22/17

 

ATTENDING PHYSICIAN:  Becca Camacho MD *(report dictated by David Pierce NP).

 

PRIMARY CARE PROVIDER:  Dr. Blevins at the [facility].

 

PAIN SPECIALIST:  Dr. Wilder.

 

ID SPECIALIST:  Dr. Mejia.

 

WOUND SPECIALIST:  Dr. Martinez.

 

PRIMARY DIAGNOSES:

1.  Left lower extremity cellulitis with left calcaneal wound with 
osteomyelitis in the setting of underlying lymphedema with positive wound 
cultures for MRSA and Enterobacter.

2.  Insulin-dependent diabetes with neuropathy.

3.  Morbid obesity.

4.  Hypertension, uncontrolled.

 

SECONDARY DIAGNOSES:

1.  Obstructive sleep apnea.

2.  Back pain/chronic pain, on Suboxone.

3.  Chronic obstructive pulmonary disease, on CPAP.

 

DISCHARGE MEDICATIONS:

1.  Lidocaine 4% 1 application topical daily as needed.

2.  Vitamin B12, 1 tablet p.o. daily.

3.  Omeprazole 20 mg p.o. daily.

4.  Insulin aspart 0 to 100 units subcu with meals per sliding scale.

5.  Lantus 38 units subcu b.i.d.

6.  Lisinopril 30 mg p.o. daily.

7.  Atorvastatin 80 mg p.o. daily.

8.  Atenolol 50 mg p.o. daily.

9.  Metformin 1000 mg p.o. b.i.d.

10.  Claritin 20 mg p.o. daily p.r.n.

11.  Vitamin D 400 units p.o. daily.

12.  Calcium carbonate 1000 mg p.o. b.i.d. as needed.

13.  Suboxone 8/2 mg half strip sublingual b.i.d.

14.  Ciprofloxacin 750 mg p.o. b.i.d. x2 weeks.

15.  Vancomycin 2 weeks.  This has been set up per Dr. Mejia.

 

NEW MEDICATIONS:

1.  Percocet 5/325 mg 1 to 2 tablets p.o. q.6 hours p.r.n., max daily dose 8.

2.  Norvasc 5 mg p.o. at bedtime for uncontrolled hypertension.

3.  Probiotic Culturelle 1 cap p.o. b.i.d.

 

HISTORY OF PRESENT ILLNESS AND HOSPITAL COURSE:  Please see history and 
physical by Jesse Hernandez NP, for full admission details, but in summary this 
is a 61-year-old male who has a past medical history as stated above who 
presented to the emergency department on 04/17/17 for increase in left lower 
extremity redness, swelling, and pain.  The patient had recently been on a long 
course of IV antibiotics and followed by Dr. Mejia as an outpatient.  IV anti
-biotics were discontinued a few weeks ago followed by oral antibiotics and 
recently had a wound VAC removed.  Over the course of the past 3 to 4 days 
prior to admission, he started to get some redness, pain, swelling around the 
left calcaneus wound and he was directed to the emergency department on 04/17/
17 by the wound clinic for concern for worsening infection.  The wound culture 
was taken and a Gram stain showed showed gram- positive cocci, gram-negative 
bacilli that is growing Enterobacter.  The PCR was positive for Staph aureus 
and MRSA.  The patient was started on IV antibiotics with vancomycin and 
cefepime.  He has had much improvement throughout his hospitalization with 
decrease in pain, redness, and swelling.  However, he has continued to complain 
of lot of lower extremity pain, which is not being covered by his Suboxone.  I 
discussed this with Dr. Mejia as well as my attending physician who agree 
that the patient probably is requiring opiates in the setting of this acute on 
chronic pain.  I do know the patient is on Suboxone.  I discussed with the 
patient at length.  He reports the Percocet for breakthrough has been improving 
his pain.  I did call Dr. Wilder's office yesterday on 04/21/17, and he is out 
of the office until Monday.  I discussed with the patient.  The plan will be to 
send him home on a few days' supply of Percocet, but he needs to follow up 
closely with Dr. Wilder as he is managing his pain and is currently on 
Suboxone.

 

The patient has been set up with a PICC line and the plan will be for the 
patient to go home with IV infusions of vancomycin.  Dr. Mejia will continue 
to follow as an outpatient.  The patient will also follow closely with the 
wound clinic.  The patient was seen in consultation by our surgical team, Dr. Bailey, who did not feel that the patient needed debridement of his wound.

 

The patient's blood sugars were noted to be quite high in the 200s; however, we 
did take him off his home regimen.  This is most likely part of the problem 
along with the initial infection that was going on.  His blood sugars have 
trended down to the low 200s and the plan will be for the patient to restart 
his home regimen on discharge and monitor closely.  It was discussed with the 
patient if he consistently is over 200s in the next couple of days, he needs to 
contact his primary care provider.

 

The patient has been noted to have some uncontrolled hypertension with systolic 
blood pressures in the 180s early in the morning or in the middle of the night.
  I have added on an additional Norvasc to be taken at bedtime.

 

The patient has been started on probiotic b.i.d.  He has been tolerating the IV 
antibiotics well with no diarrhea or rash noted.  He has remained afebrile 
throughout hospitalization and hemodynamically stable.  He has had no 
leukocytosis throughout hospitalization.  His labs have been fairly 
unremarkable other than requiring some electrolyte replacement of potassium.

 

The patient's blood cultures were no growth, day 5.

 

DISCHARGE PLAN:

1.  The patient will be discharged to home today.  I discussed the discharge 
plan with the patient as well as his wife.  Again, the pain management was 
discussed with the patient and the wife.  At this time, the patient will 
continue the Suboxone and has been given a couple of days' supply of Percocet 
and was instructed to call Dr. Wilder on Monday morning for a followup 
appointment this week.

2.  Follow up with Dr. Mejia.  Please call his office for a followup 
appointment.

3.  The patient was instructed to call Dr. Blevins's office for a followup 
within 1 week.

4.  The patient has been set up with IV infusions at home that Dr. Mejia 
will be following for vancomycin 2 weeks with weekly labs.

 

TIME SPENT:  Approximately 60 minutes was spent on this admission.

 

____________________________________ DAVID PIERCE NP



CC:  Dr. Wilder; Dr. Mejia; Dr. Martinez; Dr. Blevins *

 

[id number]/CPS #: [id number]

MTDD

## 2017-04-22 NOTE — DCNOTE
Subjective


Date of Service: 04/22/17


Interval History: 








patient reports he feels ready to go home but is concerned about the pain he is 

in. I discussed with him the concerns about percocet/opioids and suboxone, he 

plans to follow up with Dr. Wilder on Monday. The patient denies any fever 

chills N/V/D. 











Objective


Active Medications: 








Acetaminophen (Tylenol Tab*)  650 mg PO Q4H [facility]


   Last Admin: 04/22/17 09:35 Dose:  650 mg


Albuterol (Ventolin 2.5 Mg/3 Ml Neb.Sol*)  2.5 mg INH Q2H PRN


   PRN Reason: SOB/WHEEZING


Atenolol (Tenormin Tab*)  50 mg PO QAM [facility]


   Last Admin: 04/22/17 08:14 Dose:  50 mg


Atorvastatin Calcium (Lipitor*)  80 mg PO DAILY [facility]


   Last Admin: 04/22/17 08:13 Dose:  80 mg


Buprenorphine/Naloxone (Suboxone 8-2 Mg Sl Tab*)  0.5 tab.sl SL BID [facility]


   Last Admin: 04/22/17 08:13 Dose:  0.5 tab.sl


Calcium Carbonate (Tums*)  1,000 mg PO Q4H PRN


   PRN Reason: INDIGESTION


   Last Admin: 04/22/17 09:34 Dose:  1,000 mg


Cholecalciferol (Vitamin D Tab*)  400 unit PO DAILY [facility]


   Last Admin: 04/22/17 08:13 Dose:  400 unit


Dextrose (D50w Syringe 50 Ml*)  12.5 gm IV PUSH .FOR FS < 60 - SS PRN


   PRN Reason: FS < 60


Heparin Sodium (Porcine) (Heparin Vial(*))  5,000 units SUBCUT Q8HR [facility]


   Last Admin: 04/22/17 05:18 Dose:  5,000 units


Heparin Sodium (Porcine) (Heparin Flush Picc/Ml/Cvc(*))  1 - 3 ml FLUSH 0600,

1800 [facility]


   PRN Reason: Protocol


   Last Admin: 04/22/17 05:17 Dose:  1 ml


Cefepime HCl 2 gm/ Sodium (Chloride)  50 mls @ 100 mls/hr IVPB 0300,1500 [facility]


   Last Admin: 04/22/17 03:08 Dose:  100 mls/hr


Vancomycin HCl 1,250 mg/ (Sodium Chloride)  250 mls @ 166.667 mls/hr IVPB Q8H 

[facility]


   Last Admin: 04/22/17 08:51 Dose:  166.667 mls/hr


Insulin Glargine (Lantus(*))  50 units SUBCUT BID [facility]


   Last Admin: 04/22/17 08:50 Dose:  50 units


Insulin Human Lispro (Humalog*)  0 units SUBCUT ACHS ERNST


   PRN Reason: Protocol


   Last Admin: 04/22/17 08:50 Dose:  6 units


Insulin Human Lispro (Humalog*)  0 units SUBCUT ACHS [facility]


   PRN Reason: Protocol


   Last Admin: 04/22/17 08:50 Dose:  6 unit


Lactobacillus Rhamnosus (Culturelle*)  1 cap PO BID [facility]


   Last Admin: 04/22/17 08:14 Dose:  1 cap


Lisinopril (Prinivil Tab*)  30 mg PO DAILY [facility]


   Last Admin: 04/22/17 08:14 Dose:  30 mg


Multivitamins/Minerals (Theragran/Minerals Tab*)  1 tab PO DAILY [facility]


   Last Admin: 04/22/17 08:14 Dose:  1 tab


Omeprazole (Prilosec Cap*)  20 mg PO DAILY@0730 [facility]


   Last Admin: 04/22/17 08:14 Dose:  20 mg


Ondansetron HCl (Zofran Inj*)  4 mg IV Q6H PRN


   PRN Reason: NAUSEA


   Last Admin: 04/20/17 15:01 Dose:  4 mg


Oxycodone HCl (Roxycodone Tab*)  5 mg PO Q6H PRN


   PRN Reason: PAIN


   Last Admin: 04/22/17 04:37 Dose:  5 mg


Pharmacy Consult (Vancomycin Per Pharmacy*)  1 note FOLLOW UP . PRN


   PRN Reason: PER PROTOCOL


Prochlorperazine Edisylate (Compazine Inj*)  5 mg IV Q6H PRN


   PRN Reason: NAUSEA/VOMITING


   Last Admin: 04/18/17 22:49 Dose:  5 mg


Vitamin B Complex/Vitamin E (Complex B-100*)  1 tab PO DAILY [facility]


   Last Admin: 04/22/17 08:13 Dose:  1 tab








 Vital Signs











  04/21/17 04/21/17 04/21/17





  12:05 14:05 15:07


 


Temperature   98.2 F


 


Pulse Rate   64


 


Respiratory 18 16 20





Rate   


 


Blood Pressure   150/78





(mmHg)   


 


O2 Sat by Pulse   92





Oximetry   














  04/21/17 04/21/17 04/21/17





  16:27 18:27 20:00


 


Temperature   


 


Pulse Rate   


 


Respiratory 18 18 18





Rate   


 


Blood Pressure   





(mmHg)   


 


O2 Sat by Pulse   





Oximetry   














  04/21/17 04/21/17 04/21/17





  20:22 21:11 21:57


 


Temperature  98.1 F 


 


Pulse Rate  63 


 


Respiratory 18 16 18





Rate   


 


Blood Pressure  151/83 





(mmHg)   


 


O2 Sat by Pulse   





Oximetry   














  04/21/17 04/21/17 04/22/17





  22:22 23:57 01:25


 


Temperature   97.9 F


 


Pulse Rate   69


 


Respiratory 18 18 16





Rate   


 


Blood Pressure   180/98





(mmHg)   


 


O2 Sat by Pulse   95





Oximetry   














  04/22/17 04/22/17 04/22/17





  01:29 04:37 06:37


 


Temperature   


 


Pulse Rate 68  


 


Respiratory  16 18





Rate   


 


Blood Pressure 184/89  





(mmHg)   


 


O2 Sat by Pulse 98  





Oximetry   














  04/22/17 04/22/17 04/22/17





  08:00 08:13 09:03


 


Temperature   97.4 F


 


Pulse Rate   75


 


Respiratory 18 18 16





Rate   


 


Blood Pressure   157/91





(mmHg)   


 


O2 Sat by Pulse   96





Oximetry   











Oxygen Devices in Use Now: None


Result Diagrams: 


 04/20/17 06:04





 04/20/17 06:04


Additional Lab and Data: 


 Lab Results











  04/17/17 04/17/17 04/17/17 Range/Units





  10:10 10:29 10:29 


 


WBC   9.4   (3.5-10.8)  10^3/ul


 


RBC   4.42   (4.0-5.4)  10^6/ul


 


Hgb   11.7 L   (14.0-18.0)  g/dl


 


Hct   36 L   (42-52)  %


 


MCV   82   (80-94)  fL


 


MCH   27   (27-31)  pg


 


MCHC   33   (31-36)  g/dl


 


RDW   16 H   (10.5-15)  %


 


Plt Count   326   (150-450)  10^3/ul


 


MPV   8   (7.4-10.4)  um3


 


Neut % (Auto)   74.1   (38-83)  %


 


Lymph % (Auto)   13.5 L   (25-47)  %


 


Mono % (Auto)   10.0 H   (1-9)  %


 


Eos % (Auto)   1.9   (0-6)  %


 


Baso % (Auto)   0.5   (0-2)  %


 


Absolute Neuts (auto)   7.0   (1.5-7.7)  10^3/ul


 


Absolute Lymphs (auto)   1.3   (1.0-4.8)  10^3/ul


 


Absolute Monos (auto)   0.9 H   (0-0.8)  10^3/ul


 


Absolute Eos (auto)   0.2   (0-0.6)  10^3/ul


 


Absolute Basos (auto)   0   (0-0.2)  10^3/ul


 


Absolute Nucleated RBC   0   10^3/ul


 


Nucleated RBC %   0   


 


ESR   94 H   (0-20)  mm/Hr


 


INR (Anticoag Therapy)    0.98  (0.89-1.11)  


 


APTT    30.6  (26.0-36.3)  seconds


 


Sodium     (133-145)  mmol/L


 


Potassium     (3.5-5.0)  mmol/L


 


Chloride     (101-111)  mmol/L


 


Carbon Dioxide     (22-32)  mmol/L


 


Anion Gap     (2-11)  mmol/L


 


BUN     (6-24)  mg/dL


 


Creatinine     (0.67-1.17)  mg/dL


 


Est GFR ( Amer)     (>60)  


 


Est GFR (Non-Af Amer)     (>60)  


 


BUN/Creatinine Ratio     (8-20)  


 


Glucose     ()  mg/dL


 


Lactic Acid     (0.5-2.0)  mmol/L


 


Calcium     (8.6-10.3)  mg/dL


 


Total Bilirubin     (0.2-1.0)  mg/dL


 


AST     (13-39)  U/L


 


ALT     (7-52)  U/L


 


Alkaline Phosphatase     ()  U/L


 


Total Creatine Kinase     ()  U/L


 


Troponin I     (<0.04)  ng/mL


 


C-Reactive Protein     (< 5.00)  mg/L


 


Total Protein     (6.4-8.9)  g/dL


 


Albumin     (3.2-5.2)  g/dL


 


Globulin     (2-4)  g/dL


 


Albumin/Globulin Ratio     (1-3)  


 


Urine Color  Straw    


 


Urine Appearance  Clear    


 


Urine pH  7.0    (5-9)  


 


Ur Specific Gravity  1.008 L    (1.010-1.030)  


 


Urine Protein  Negative    (Negative)  


 


Urine Ketones  Negative    (Negative)  


 


Urine Blood  Negative    (Negative)  


 


Urine Nitrate  Negative    (Negative)  


 


Urine Bilirubin  Negative    (Negative)  


 


Urine Urobilinogen  Negative    (Negative)  


 


Ur Leukocyte Esterase  Negative    (Negative)  


 


Urine Glucose  Negative    (Negative)  














  04/17/17 04/17/17 Range/Units





  10:29 10:29 


 


WBC    (3.5-10.8)  10^3/ul


 


RBC    (4.0-5.4)  10^6/ul


 


Hgb    (14.0-18.0)  g/dl


 


Hct    (42-52)  %


 


MCV    (80-94)  fL


 


MCH    (27-31)  pg


 


MCHC    (31-36)  g/dl


 


RDW    (10.5-15)  %


 


Plt Count    (150-450)  10^3/ul


 


MPV    (7.4-10.4)  um3


 


Neut % (Auto)    (38-83)  %


 


Lymph % (Auto)    (25-47)  %


 


Mono % (Auto)    (1-9)  %


 


Eos % (Auto)    (0-6)  %


 


Baso % (Auto)    (0-2)  %


 


Absolute Neuts (auto)    (1.5-7.7)  10^3/ul


 


Absolute Lymphs (auto)    (1.0-4.8)  10^3/ul


 


Absolute Monos (auto)    (0-0.8)  10^3/ul


 


Absolute Eos (auto)    (0-0.6)  10^3/ul


 


Absolute Basos (auto)    (0-0.2)  10^3/ul


 


Absolute Nucleated RBC    10^3/ul


 


Nucleated RBC %    


 


ESR    (0-20)  mm/Hr


 


INR (Anticoag Therapy)    (0.89-1.11)  


 


APTT    (26.0-36.3)  seconds


 


Sodium  136   (133-145)  mmol/L


 


Potassium  3.4 L   (3.5-5.0)  mmol/L


 


Chloride  94 L   (101-111)  mmol/L


 


Carbon Dioxide  38 H   (22-32)  mmol/L


 


Anion Gap  4   (2-11)  mmol/L


 


BUN  17   (6-24)  mg/dL


 


Creatinine  0.81   (0.67-1.17)  mg/dL


 


Est GFR ( Amer)  125.4   (>60)  


 


Est GFR (Non-Af Amer)  97.5   (>60)  


 


BUN/Creatinine Ratio  21.0 H   (8-20)  


 


Glucose  61 L   ()  mg/dL


 


Lactic Acid   1.4  (0.5-2.0)  mmol/L


 


Calcium  9.7   (8.6-10.3)  mg/dL


 


Total Bilirubin  0.40   (0.2-1.0)  mg/dL


 


AST  10 L   (13-39)  U/L


 


ALT  13   (7-52)  U/L


 


Alkaline Phosphatase  77   ()  U/L


 


Total Creatine Kinase  37   ()  U/L


 


Troponin I  0.01   (<0.04)  ng/mL


 


C-Reactive Protein  43.41 H   (< 5.00)  mg/L


 


Total Protein  8.1   (6.4-8.9)  g/dL


 


Albumin  3.8   (3.2-5.2)  g/dL


 


Globulin  4.3 H   (2-4)  g/dL


 


Albumin/Globulin Ratio  0.9 L   (1-3)  


 


Urine Color    


 


Urine Appearance    


 


Urine pH    (5-9)  


 


Ur Specific Gravity    (1.010-1.030)  


 


Urine Protein    (Negative)  


 


Urine Ketones    (Negative)  


 


Urine Blood    (Negative)  


 


Urine Nitrate    (Negative)  


 


Urine Bilirubin    (Negative)  


 


Urine Urobilinogen    (Negative)  


 


Ur Leukocyte Esterase    (Negative)  


 


Urine Glucose    (Negative)  














Assess/Plan/Problems-Billing


Assessment: 62 yo male with a PMH of morbid obesity s/p sleeve gastrectomy, 

Diabetes, COPD, lymphedema, pre-existing L heel ulcer with osteo for which he 

recently finished 6 weeks of IV abx followed by Dr. Mejia (off abx for 2-3 

weeks) also followed by wound clinic who started him on Doxy 2 days ago who 

presented 4/17 with increased LLE redness and pain sent from wound clinic











- Patient Problems


(1) Cellulitis


Comment: 


- with chronic osteomyelitis and non-healing DM ulcer - recent 6 weeks of IV abx

, followed by ID and wound clinic as outpt now with cellulitis


- Surgery consulted stating no need for wound debridment at this time. Plan to 

follow up with wound clinic. 


- wound cx growing MRSA, enterobacter & enterococcus . Blood cx negative


- ID following.  PICC with plan to send home with Vanco for a few weeks. Plan 

for DC today   





(2) Chronic pain


Comment: 


- chronic back pain and acute leg pain


- suboxone started a few months ago per pt pain clinic due to chronic opioid 

use. Tried to call Dr. Wilder to discuss chronic pain and management but he is 

away until Monday. Discussed with patient to follow up this week with Dr. Wilder. Discussed with Dr. Velez attending and we agreed to send pt home on a 

few days of opoids for breakthrough pain since pt has been reporting 

improvement with percocet - plan to f/u closely with Meño this week. 





   





(3) HTN (hypertension)


Comment: 


mostly well controlled, a little high during the night


c/w lisinopril,  Atenolol - added on norvasc at night   





(4) Type 2 diabetes mellitus


Comment: 


improving but continues to be in the 200s


plan for patient to restart home meds and regimen on discharge   





(5) History of hypertension


Comment: 


- continue lisinopril, atenolol    





(6) Lymphedema


Comment: 


- ace wraps and elevation to LE's   





(7) Full code status








(8) DVT prophylaxis


Comment: 


HSQ   


Status and Disposition: 





inpatient requiring IV antibiotics. Plan for DC to home today

## 2017-05-30 ENCOUNTER — HOSPITAL ENCOUNTER (INPATIENT)
Dept: HOSPITAL 25 - MED | Age: 62
LOS: 17 days | Discharge: NURSING, REHAB OR LONG-TERM CARE | DRG: 617 | End: 2017-06-16
Attending: HOSPITALIST | Admitting: HOSPITALIST
Payer: COMMERCIAL

## 2017-05-30 DIAGNOSIS — Z87.891: ICD-10-CM

## 2017-05-30 DIAGNOSIS — J44.9: ICD-10-CM

## 2017-05-30 DIAGNOSIS — Z98.84: ICD-10-CM

## 2017-05-30 DIAGNOSIS — E78.5: ICD-10-CM

## 2017-05-30 DIAGNOSIS — I11.0: ICD-10-CM

## 2017-05-30 DIAGNOSIS — Z79.4: ICD-10-CM

## 2017-05-30 DIAGNOSIS — E66.01: ICD-10-CM

## 2017-05-30 DIAGNOSIS — Z89.421: ICD-10-CM

## 2017-05-30 DIAGNOSIS — E11.65: ICD-10-CM

## 2017-05-30 DIAGNOSIS — L97.519: ICD-10-CM

## 2017-05-30 DIAGNOSIS — M25.519: ICD-10-CM

## 2017-05-30 DIAGNOSIS — E11.69: ICD-10-CM

## 2017-05-30 DIAGNOSIS — L03.116: ICD-10-CM

## 2017-05-30 DIAGNOSIS — R33.9: ICD-10-CM

## 2017-05-30 DIAGNOSIS — Z88.2: ICD-10-CM

## 2017-05-30 DIAGNOSIS — E11.621: Primary | ICD-10-CM

## 2017-05-30 DIAGNOSIS — L03.031: ICD-10-CM

## 2017-05-30 DIAGNOSIS — G47.33: ICD-10-CM

## 2017-05-30 DIAGNOSIS — Z91.19: ICD-10-CM

## 2017-05-30 DIAGNOSIS — G89.29: ICD-10-CM

## 2017-05-30 DIAGNOSIS — M54.9: ICD-10-CM

## 2017-05-30 DIAGNOSIS — I89.0: ICD-10-CM

## 2017-05-30 DIAGNOSIS — L97.429: ICD-10-CM

## 2017-05-30 DIAGNOSIS — E11.40: ICD-10-CM

## 2017-05-30 DIAGNOSIS — I50.32: ICD-10-CM

## 2017-05-30 DIAGNOSIS — M86.672: ICD-10-CM

## 2017-05-30 LAB
ALBUMIN SERPL BCG-MCNC: 3.5 G/DL (ref 3.2–5.2)
ALP SERPL-CCNC: 69 U/L (ref 34–104)
ALT SERPL W P-5'-P-CCNC: 12 U/L (ref 7–52)
ANION GAP SERPL CALC-SCNC: 7 MMOL/L (ref 2–11)
AST SERPL-CCNC: 11 U/L (ref 13–39)
BUN SERPL-MCNC: 33 MG/DL (ref 6–24)
BUN/CREAT SERPL: 30 (ref 8–20)
CALCIUM SERPL-MCNC: 9.3 MG/DL (ref 8.6–10.3)
CHLORIDE SERPL-SCNC: 96 MMOL/L (ref 101–111)
GLOBULIN SER CALC-MCNC: 3.8 G/DL (ref 2–4)
GLUCOSE SERPL-MCNC: 104 MG/DL (ref 70–100)
HCO3 SERPL-SCNC: 34 MMOL/L (ref 22–32)
HCT VFR BLD AUTO: 35 % (ref 42–52)
HGB BLD-MCNC: 11 G/DL (ref 14–18)
MCH RBC QN AUTO: 25 PG (ref 27–31)
MCHC RBC AUTO-ENTMCNC: 31 G/DL (ref 31–36)
MCV RBC AUTO: 79 FL (ref 80–94)
POTASSIUM SERPL-SCNC: 4.5 MMOL/L (ref 3.5–5)
PROT SERPL-MCNC: 7.3 G/DL (ref 6.4–8.9)
RBC # BLD AUTO: 4.43 10^6/UL (ref 4–5.4)
SODIUM SERPL-SCNC: 137 MMOL/L (ref 133–145)
WBC # BLD AUTO: 11.7 10^3/UL (ref 3.5–10.8)

## 2017-05-30 PROCEDURE — 62323 NJX INTERLAMINAR LMBR/SAC: CPT

## 2017-05-30 PROCEDURE — 87186 SC STD MICRODIL/AGAR DIL: CPT

## 2017-05-30 PROCEDURE — 85025 COMPLETE CBC W/AUTO DIFF WBC: CPT

## 2017-05-30 PROCEDURE — 83036 HEMOGLOBIN GLYCOSYLATED A1C: CPT

## 2017-05-30 PROCEDURE — 80053 COMPREHEN METABOLIC PANEL: CPT

## 2017-05-30 PROCEDURE — 82947 ASSAY GLUCOSE BLOOD QUANT: CPT

## 2017-05-30 PROCEDURE — 87077 CULTURE AEROBIC IDENTIFY: CPT

## 2017-05-30 PROCEDURE — 87205 SMEAR GRAM STAIN: CPT

## 2017-05-30 PROCEDURE — 87185 SC STD ENZYME DETCJ PER NZM: CPT

## 2017-05-30 PROCEDURE — 86140 C-REACTIVE PROTEIN: CPT

## 2017-05-30 PROCEDURE — 82565 ASSAY OF CREATININE: CPT

## 2017-05-30 PROCEDURE — 93005 ELECTROCARDIOGRAM TRACING: CPT

## 2017-05-30 PROCEDURE — 87040 BLOOD CULTURE FOR BACTERIA: CPT

## 2017-05-30 PROCEDURE — 80048 BASIC METABOLIC PNL TOTAL CA: CPT

## 2017-05-30 PROCEDURE — 88311 DECALCIFY TISSUE: CPT

## 2017-05-30 PROCEDURE — C1751 CATH, INF, PER/CENT/MIDLINE: HCPCS

## 2017-05-30 PROCEDURE — 94760 N-INVAS EAR/PLS OXIMETRY 1: CPT

## 2017-05-30 PROCEDURE — 87073 CULTURE BACTERIA ANAEROBIC: CPT

## 2017-05-30 PROCEDURE — 88307 TISSUE EXAM BY PATHOLOGIST: CPT

## 2017-05-30 PROCEDURE — 71020: CPT

## 2017-05-30 PROCEDURE — 87070 CULTURE OTHR SPECIMN AEROBIC: CPT

## 2017-05-30 PROCEDURE — 94660 CPAP INITIATION&MGMT: CPT

## 2017-05-30 PROCEDURE — 36415 COLL VENOUS BLD VENIPUNCTURE: CPT

## 2017-05-30 PROCEDURE — 80202 ASSAY OF VANCOMYCIN: CPT

## 2017-05-30 PROCEDURE — 82803 BLOOD GASES ANY COMBINATION: CPT

## 2017-05-30 PROCEDURE — 84520 ASSAY OF UREA NITROGEN: CPT

## 2017-05-30 RX ADMIN — OXYCODONE HYDROCHLORIDE PRN MG: 5 CAPSULE ORAL at 19:46

## 2017-05-30 RX ADMIN — Medication SCH CAP: at 22:43

## 2017-05-30 RX ADMIN — HEPARIN SODIUM SCH UNITS: 5000 INJECTION INTRAVENOUS; SUBCUTANEOUS at 15:13

## 2017-05-30 RX ADMIN — OXYCODONE HYDROCHLORIDE PRN MG: 5 CAPSULE ORAL at 14:57

## 2017-05-30 RX ADMIN — INSULIN GLARGINE SCH UNITS: 100 INJECTION, SOLUTION SUBCUTANEOUS at 22:42

## 2017-05-30 RX ADMIN — INSULIN LISPRO SCH UNITS: 100 INJECTION, SOLUTION INTRAVENOUS; SUBCUTANEOUS at 17:42

## 2017-05-30 RX ADMIN — HEPARIN SODIUM SCH UNITS: 5000 INJECTION INTRAVENOUS; SUBCUTANEOUS at 22:42

## 2017-05-30 RX ADMIN — ONDANSETRON PRN MG: 2 INJECTION INTRAMUSCULAR; INTRAVENOUS at 19:45

## 2017-05-30 RX ADMIN — AMLODIPINE BESYLATE SCH MG: 5 TABLET ORAL at 19:51

## 2017-05-30 RX ADMIN — METFORMIN HYDROCHLORIDE SCH MG: 1000 TABLET, FILM COATED ORAL at 17:42

## 2017-05-30 RX ADMIN — ACETAMINOPHEN PRN MG: 325 TABLET ORAL at 14:57

## 2017-05-30 RX ADMIN — CALCIUM CARBONATE (ANTACID) CHEW TAB 500 MG PRN MG: 500 CHEW TAB at 19:45

## 2017-05-30 NOTE — HP
ADMISSION HISTORY AND PHYSICAL:

 

DATE OF ADMISSION:  05/30/17

 

PRIMARY CARE PROVIDER:  Dr. Blevins associated with [facility].

 

INFECTIOUS DISEASE SPECIALIST:  Bj Mejia MD.

 

REFERRING ORTHOPEDIC SURGEON:  Dr. Kuhn.

 

ADMITTING PROVIDER:  SHIRA Choe.

 

SUPERVISING PHYSICIAN:  KORINA Adler (dictated by SHIRA Choe).

 

CHIEF COMPLAINT:  Chronic osteomyelitis of the left calcaneus with concern for 
acute cellulitis and increased edema.

 

HISTORY OF PRESENT ILLNESS:  This is a 61-year-old gentleman with insulin-
dependent diabetes, chronic lymphedema, known osteomyelitis of the left 
calcaneus as well as obstructive sleep apnea, significant chronic pain as well 
as hypertension who was referred directly to the hospital following an office 
visit with Dr. Kuhn earlier today.  The patient had a wound on his left heel 
for several months and was actually admitted just over a month ago in April of 
this year for treatment of ulcer of his left foot with an associated cellulitis 
at that time.  MRI imaging from the month prior in March had confirmed the 
presence of osteomyelitis.  Micro from his prior admission grew Enterobacter.  
PCR was positive for MRSA and the patient received 6 to 8 weeks of IV 
vancomycin and has been continued on oral doxycycline which he states that he 
has been compliant with.

 

The patient has also been participating with the wound care center.  Over 
perhaps the last week or so, there may have been some increased breakdown and 
drainage noted from his wound.  The osteomyelitis is in his left heel, but he 
had some additional breakdown in his right foot as well.  He believes that the 
leg perhaps looks slightly more erythematous and edematous and he has been kind 
of chilled and had some occasional sweats recently.  No documented fevers.

 

In terms of wound care, the patient has been applying calcium alginate to his 
left heel ulcer and has been otherwise keeping both legs wrapped to the knees 
with Ace bandages and changing this daily.

 

The patient also reports that he has been struggling with pain control.  He is 
prescribed Suboxone 8 mg 3 times daily by Dr. Wilder out of [place], but states 
that he has been receiving incomplete pain relief from the Suboxone and took 
himself off it a couple of days ago in hopes of finding an alternate medication 
that may provide better relief.

 

PAST MEDICAL HISTORY:

1.  Chronic lymphedema.

2.  Insulin-dependent diabetes - poor control.

3.  COPD.

4.  Hypertension.

5.  Osteomyelitis of the left calcaneus.

6.  Obstructive sleep apnea, noncompliant with CPAP.

7.  Chronic pain for which he is currently prescribed Suboxone.

8.  Morbid obesity with a BMI of 50.

 

PAST SURGICAL HISTORY:

1.  Partial amputation of the right toe.

2.  Gastric sleeve procedure within the last 12 months.

3.  Knee arthroscopy.

 

HOME MEDICATIONS:

1.  Atenolol 50 mg p.o. daily.

2.  Atorvastatin 80 mg p.o. daily.

3.  Suboxone 8 mg sublingual 3 times daily.

4.  Tums 1000 mg p.o. twice daily.

5.  Vitamin D 400 units p.o. daily.

6.  NovoLog at mealtime per sliding scale.

7.  Lactobacillus 1 capsule p.o. twice daily.

8.  Lisinopril 30 mg p.o. daily.

9.  Loratadine 20 mg p.o. daily.

10.  Omeprazole 20 mg p.o. daily.

11.  Vitamin B complex 1 tablet p.o. daily.

12.  Amlodipine 5 mg p.o. at bedtime.

13.  Metformin 1000 mg p.o. twice daily.

14.  Lantus 38 units subcu twice daily.

 

SOCIAL HISTORY:  The patient lives at home with his wife.  He is a former floor 
.  He is a former smoker, quit several years ago, unsure of exact pack 
year history.  No regular alcohol consumption.

 

REVIEW OF SYSTEMS:  As listed above in HPI.  All other systems reviewed and 
otherwise negative.

 

                               PHYSICAL EXAMINATION

 

GENERAL:  This is a pleasant middle-aged gentleman who is in no acute distress.

 

INITIAL VITAL SIGNS:  Temperature 98.1 degrees Fahrenheit, pulse 72 beats per 
minute, respiratory rate 18 per minute, oxygen saturation 91% on room air, and 
blood pressure 115/58 mmHg.

 

HEENT:  Head is normocephalic, atraumatic.  Mucous membranes are pink and moist.

 

RESPIRATORY:  Lungs are clear to auscultation without wheezes, crackles, or 
rhonchi.

 

CARDIOVASCULAR:  Heart has a regular rate and rhythm without murmurs, rubs, or 
gallops.

 

ABDOMEN:  Soft and nontender to palpation.

 

EXTREMITIES:  The patient has Ace wraps to just below the knee which are taken 
down revealing perhaps 1+ to 2+ edema bilaterally.

 

SKIN:  The patient has severely macerated skin over both feet with some 
breakdown over the dorsal aspect of his first and second toes with what appears 
to be a new blister on the medial aspect of his right great toe without 
associated ulceration. On the left side, he has a large what appears to be 
chronic ulcer over the plantar aspect of his left calcaneus measuring perhaps 6 
x 4 cm and approximately 1 cm in depth.  No active drainage appreciated.  There 
is some perhaps faint hyperpigmentation versus erythema covering both lower 
extremities to just below the knee.  There is a weeping drainage coming from 
both legs, right greater than left.

 

PSYCH:  The patient is alert and appropriately oriented.

 

 LABORATORY EVALUATION:  CBC shows a white blood cell count of 11,000, 
hemoglobin of 11, platelet count of 320,000.  Comprehensive metabolic panel 
largely unremarkable. Sodium of 137 mmol/L, potassium 4.5, BUN of 33, 
creatinine 1.1, estimated GFR of 68, random glucose of 104 mg/dL.

 

Transaminases and total bilirubin within normal limits.

 

CRP is pending.

 

IMAGING:  Chest x-ray shows chronic findings consistent with COPD, but no acute 
findings.

 

EKG is pending.

 

MRI reviewed from March 2017 which confirmed presence of osteomyelitis in the 
left calcaneus.

 

ASSESSMENT AND PLAN:  This is a 61-year-old gentleman with chronic lymphedema, 
insulin-dependent diabetes, hypertension, chronic obstructive pulmonary disease
, and known osteomyelitis of the left calcaneus as well as obstructive sleep 
apnea and chronic pain who was referred to the hospital by Dr. Kuhn for 
concern for an acute cellulitis on top of a known left calcaneal osteomyelitis.

 

1.  Left calcaneal osteomyelitis with perhaps a secondary cellulitis - the 
patient is currently afebrile with a mild leukocytosis.  CRP is currently 
pending.  There is no purulent drainage appreciated from the left foot ulcer at 
this time, but there is significant serous weeping from both legs with 
significant breakdown of both feet and what appears to be wet macerated skin.  
In terms of treatment, we will resume IV vancomycin at this time.  We will ask 
Dr. Bj Mejia to reevaluate.  I do not see utility in re-imaging at this 
time as there is known osteomyelitis in the left calcaneus.  In terms of 
surgical planning, we will defer to Orthopedic Surgery to help with decision.  
I do not see that there is obvious debridement necessary at this time with the 
left ulcer, but perhaps consideration for amputation may be necessary as there 
seems to have been very little wound healing over the last several months 
despite active engagement in the wound care center and appropriate antibiotics.
  We will ask the wound care nurses to please evaluate for appropriate dressings
, but it seems like the amount of moisture present is the biggest culprit.  
Advised nursing to wrap an ABD pads to help whip away some of the moisture and 
then secure with Ace bandages and silver alginate to be applied to the left 
heel ulcer.  Requested Dr. Mejia to please evaluate this patient tomorrow.

2.  Chronic lymphedema - this is managed with lower extremity compression.

3.  Insulin-dependent diabetes - the patient has a normal random glucose at 
this time.  His hemoglobin A1c from about a month ago was 9%.  Plan to repeat 
this and continue his home insulin regimen including twice daily Lantus, oral 
metformin, and mealtime short-acting insulin.

4.  Chronic obstructive pulmonary disease - without acute exacerbation.

5.  Chronic pain - the patient is followed by Dr. Wilder who prescribed 
Suboxone at high doses.  The patient reports that he gets incomplete pain 
relief from this. Reviewed prescription monitoring program online and it does 
appear that he has filled his Suboxone from Dr. Wilder only at appropriate 
intervals.  Called and spoke with Dr. Wilder as after my interview with him, I 
was concerned about him discontinuing his own Suboxone and requesting an 
alternate medication.  Had a nice discussion with Dr. Wilder who states that 
he started seeing the patient about 9 months ago and he had inherited the 
patient from Dr. Taylor.  There does not appear to be a significant abuse 
history and agrees with the assessment that he is likely receiving incomplete 
pain relief from the Suboxone.  The patient had been telling him that as well 
for the last several months and prior to that, his pain control was relatively 
good.  The patient's sources of pain involved multiple joints including his back
, shoulders, knees, and feet.  We will plan to discontinue his Suboxone at this 
time and start oral oxycodone.  The patient will like a referral to pain 
management here through our hospital at the time of discharge.

6.  Obstructive sleep apnea - noncompliant with CPAP.

7.  Hypertension - continue home antihypertensives at this time.

8.  Morbid obesity with a BMI of 50.

9.  Code status:  The patient is a full code.  Healthcare proxy is the patient'
s wife.

10.  DVT prophylaxis:  The patient will be started on subcu heparin.

 

DISPOSITION:  He is being admitted to inpatient status with anticipated length 
of stay to be greater than 2 days.

 

 ____________________________________ 

SHRIA CHOE

 

CC:  Dr. Blevins, [facility]*

 

[id number]/[facility] #: [id number]

MTDD

## 2017-05-30 NOTE — HP
ADMISSION HISTORY AND PHYSICAL:

 

DATE OF ADMISSION:  05/30/17

 

ADMISSION DIAGNOSES:  Diabetes with neuropathic ulcer, right forefoot and left 
heel, with cellulitis and edema.

 

HISTORY OF PRESENT ILLNESS:  Shane is a 61-year-old gentleman who has had 
chronic lymphedema, cellulitis of both lower extremities.  He has been treated 
in the wound clinic and by foot specialist for the left heel ulcer which is 
right in the plantar heel.  He has had IV antibiotics, silver alginate 
dressings and has been recently seen by Infectious Disease.  He also in the 
last week has had breakdown and swelling and drainage of his right forefoot, 
second and great toe.  This has caused foul smelling discharge and increased 
cellulitis of the right foot.  He presents to my office today with these above 
findings.  Purulent drainage from the left heel and the right forefoot, 
symptoms of fevers and chills and malaise for the last 3 to 4 days, and 
slightly elevated sugars.  The plan currently will be an admission to the 
medical service for elevation of IV antibiotics and evaluation by the 
infectious disease service as well as medical optimization.  Orthopedics will 
follow along with recommendations for surgery as indicated.

 

PAST MEDICAL HISTORY:  Shane has multiple diagnoses including hypertension, 
high cholesterol, osteoarthritis, type 2 diabetes, and chronic pain issues.

 

PAST SURGICAL HISTORY:  He has had  previous surgeries including arthroscopy, 
right foot surgery by Podiatry.  Bariatric surgery October 2016.

 

MEDICATIONS:  Include:

1.  Doxycycline 100 mg twice a day.

2.  Suboxone 8 mg 3 times a day.

3.  Metformin 1000 mg twice a day.

4.  Lisinopril twice a day.

5.  Atenolol 50 mg per day.

6.  Atorvastatin 80 mg per day.

7.  Lantus 38 units b.i.d.

8.  NovoLog sliding scale.

9.  Omeprazole 20 mg a day.

 

ALLERGIES:  He has no known drug allergies.

 

SOCIAL HISTORY:  The patient lives with his wife.  He is disabled.  Previously 
worked in [employer] and no tobacco use.  He has been in pain treatment off 
and on, recently been started on the Suboxone by Dr. Wilder in [place].

 

                               PHYSICAL EXAMINATION

 

GENERAL:  Shane is a heavy set gentleman with a slightly flat affect, 
appropriate mood and conversant and knowledgeable with his history.  He is 
complaining of persistent pain throughout the encounter and claiming that his 
Suboxone is not helping his pain.

 

VITAL SIGNS:  Currently are as follows:  Temperature 98.6, pulse 64, 
respirations 16, and the patient is 6 feet 1 inches, 340 pounds, and pressure 98
/62.

 

HEENT:  His oropharynx is clear.  Dentition is poor.

 

NECK:  Supple.

 

CHEST EXAM:  Clear to auscultation in all lung fields.  I do not hear wheezing 
or rales.

 

CARDIAC EXAM:  Shows a regular rate.  No extra sounds noted.

 

ABDOMEN:  Large, distended, relatively soft, nontender.

 

EXTREMITIES:  Exam shows him to have warm feet bilaterally but massive 
cellulitis with thickened skin and weeping lymphedema diffusely below the mid 
calf bilaterally.  Very difficult to feel the pulse because of the induration 
and edema in both ankles.  Right forefoot with weeping, enlarged second and 
great toe, slightly foul smelling discharge, and again massive cellulitis.  
Left heel with a silver dollar sized ulcer chronic, edematous, weeping serous 
thick, foul smelling fluid.

 

 DIAGNOSTIC STUDIES/LABORATORY DATA:  MRI previously showed some edema in the 
left calcaneus, posterior and lateral portions.

 

IMPRESSION:  The patient with massive lymphedema cellulitis and draining ulcers
, bilateral lower extremity, in the case of diabetes and neuropathy. PLAN:  At 
this point is admission for elevation, dressing changes, IV antibiotics, 
Infectious Disease consultation, and medical optimization.  Orthopedics to 
follow up in the hospital.

 

 

 

[id number] #: [id number]

MYA

## 2017-05-30 NOTE — RAD
INDICATION:  Fever.



COMPARISON:  Comparison is made with prior chest x-ray studies from our third 2017 and

April 20, 2017.



TECHNIQUE: Dual-energy PA  and lateral views of the chest were obtained.



FINDINGS:   The heart is within normal limits in size. Mediastinal and hilar contours

appear within normal limits.



The lungs are hyperinflated. There is mild diffuse prominence of the interstitial markings

which are unchanged. No focal infiltrate or pleural effusion is seen. 



IMPRESSION:  FINDINGS CONSISTENT WITH COPD, NO EVIDENCE FOR ACUTE FINDING.

## 2017-05-31 LAB
ANION GAP SERPL CALC-SCNC: 6 MMOL/L (ref 2–11)
BUN SERPL-MCNC: 26 MG/DL (ref 6–24)
BUN/CREAT SERPL: 28 (ref 8–20)
CALCIUM SERPL-MCNC: 9.1 MG/DL (ref 8.6–10.3)
CHLORIDE SERPL-SCNC: 93 MMOL/L (ref 101–111)
GLUCOSE SERPL-MCNC: 250 MG/DL (ref 70–100)
HCO3 SERPL-SCNC: 35 MMOL/L (ref 22–32)
HCT VFR BLD AUTO: 36 % (ref 42–52)
HGB BLD-MCNC: 11.1 G/DL (ref 14–18)
MCH RBC QN AUTO: 25 PG (ref 27–31)
MCHC RBC AUTO-ENTMCNC: 31 G/DL (ref 31–36)
MCV RBC AUTO: 79 FL (ref 80–94)
POTASSIUM SERPL-SCNC: 4.9 MMOL/L (ref 3.5–5)
RBC # BLD AUTO: 4.49 10^6/UL (ref 4–5.4)
SODIUM SERPL-SCNC: 134 MMOL/L (ref 133–145)
WBC # BLD AUTO: 11.4 10^3/UL (ref 3.5–10.8)

## 2017-05-31 RX ADMIN — MORPHINE SULFATE PRN MG: 4 INJECTION, SOLUTION INTRAMUSCULAR; INTRAVENOUS at 17:58

## 2017-05-31 RX ADMIN — LISINOPRIL SCH MG: 10 TABLET ORAL at 08:25

## 2017-05-31 RX ADMIN — OXYCODONE HYDROCHLORIDE PRN MG: 5 CAPSULE ORAL at 15:57

## 2017-05-31 RX ADMIN — OXYCODONE HYDROCHLORIDE PRN MG: 5 CAPSULE ORAL at 11:35

## 2017-05-31 RX ADMIN — INSULIN LISPRO SCH UNITS: 100 INJECTION, SOLUTION INTRAVENOUS; SUBCUTANEOUS at 17:40

## 2017-05-31 RX ADMIN — FUROSEMIDE SCH MG: 20 TABLET ORAL at 15:22

## 2017-05-31 RX ADMIN — MORPHINE SULFATE PRN MG: 4 INJECTION, SOLUTION INTRAMUSCULAR; INTRAVENOUS at 15:57

## 2017-05-31 RX ADMIN — HEPARIN SODIUM SCH UNITS: 5000 INJECTION INTRAVENOUS; SUBCUTANEOUS at 07:16

## 2017-05-31 RX ADMIN — HEPARIN SODIUM SCH UNITS: 5000 INJECTION INTRAVENOUS; SUBCUTANEOUS at 13:36

## 2017-05-31 RX ADMIN — OXYCODONE HYDROCHLORIDE SCH MG: 20 TABLET, FILM COATED, EXTENDED RELEASE ORAL at 21:58

## 2017-05-31 RX ADMIN — INSULIN GLARGINE SCH UNITS: 100 INJECTION, SOLUTION SUBCUTANEOUS at 21:57

## 2017-05-31 RX ADMIN — VANCOMYCIN HYDROCHLORIDE SCH MLS/HR: 1 INJECTION, POWDER, LYOPHILIZED, FOR SOLUTION INTRAVENOUS at 08:02

## 2017-05-31 RX ADMIN — HEPARIN SODIUM SCH UNITS: 5000 INJECTION INTRAVENOUS; SUBCUTANEOUS at 21:58

## 2017-05-31 RX ADMIN — ATORVASTATIN CALCIUM SCH MG: 80 TABLET, FILM COATED ORAL at 08:25

## 2017-05-31 RX ADMIN — ATENOLOL SCH MG: 50 TABLET ORAL at 08:25

## 2017-05-31 RX ADMIN — CALCIUM CARBONATE (ANTACID) CHEW TAB 500 MG PRN MG: 500 CHEW TAB at 10:55

## 2017-05-31 RX ADMIN — VANCOMYCIN HYDROCHLORIDE SCH MLS/HR: 1 INJECTION, POWDER, LYOPHILIZED, FOR SOLUTION INTRAVENOUS at 15:22

## 2017-05-31 RX ADMIN — METFORMIN HYDROCHLORIDE SCH MG: 1000 TABLET, FILM COATED ORAL at 17:39

## 2017-05-31 RX ADMIN — INSULIN LISPRO SCH UNITS: 100 INJECTION, SOLUTION INTRAVENOUS; SUBCUTANEOUS at 12:49

## 2017-05-31 RX ADMIN — OXYCODONE HYDROCHLORIDE PRN MG: 5 CAPSULE ORAL at 03:51

## 2017-05-31 RX ADMIN — MORPHINE SULFATE PRN MG: 4 INJECTION, SOLUTION INTRAMUSCULAR; INTRAVENOUS at 09:19

## 2017-05-31 RX ADMIN — CALCIUM CARBONATE (ANTACID) CHEW TAB 500 MG PRN MG: 500 CHEW TAB at 21:58

## 2017-05-31 RX ADMIN — ONDANSETRON PRN MG: 2 INJECTION INTRAMUSCULAR; INTRAVENOUS at 13:36

## 2017-05-31 RX ADMIN — OMEPRAZOLE SCH MG: 20 CAPSULE, DELAYED RELEASE ORAL at 07:30

## 2017-05-31 RX ADMIN — VANCOMYCIN HYDROCHLORIDE SCH MLS/HR: 1 INJECTION, POWDER, LYOPHILIZED, FOR SOLUTION INTRAVENOUS at 23:09

## 2017-05-31 RX ADMIN — Medication SCH CAP: at 21:58

## 2017-05-31 RX ADMIN — MORPHINE SULFATE PRN MG: 4 INJECTION, SOLUTION INTRAMUSCULAR; INTRAVENOUS at 20:01

## 2017-05-31 RX ADMIN — AMLODIPINE BESYLATE SCH MG: 5 TABLET ORAL at 21:58

## 2017-05-31 RX ADMIN — VANCOMYCIN HYDROCHLORIDE SCH MLS/HR: 1 INJECTION, POWDER, LYOPHILIZED, FOR SOLUTION INTRAVENOUS at 00:32

## 2017-05-31 RX ADMIN — ONDANSETRON PRN MG: 2 INJECTION INTRAMUSCULAR; INTRAVENOUS at 23:04

## 2017-05-31 RX ADMIN — MORPHINE SULFATE PRN MG: 4 INJECTION, SOLUTION INTRAMUSCULAR; INTRAVENOUS at 23:05

## 2017-05-31 RX ADMIN — OXYCODONE HYDROCHLORIDE PRN MG: 5 CAPSULE ORAL at 07:38

## 2017-05-31 RX ADMIN — METFORMIN HYDROCHLORIDE SCH MG: 1000 TABLET, FILM COATED ORAL at 08:25

## 2017-05-31 RX ADMIN — INSULIN GLARGINE SCH UNITS: 100 INJECTION, SOLUTION SUBCUTANEOUS at 09:19

## 2017-05-31 RX ADMIN — OXYCODONE HYDROCHLORIDE SCH MG: 20 TABLET, FILM COATED, EXTENDED RELEASE ORAL at 15:21

## 2017-05-31 RX ADMIN — Medication SCH CAP: at 08:25

## 2017-05-31 RX ADMIN — INSULIN LISPRO SCH UNITS: 100 INJECTION, SOLUTION INTRAVENOUS; SUBCUTANEOUS at 07:46

## 2017-05-31 RX ADMIN — MORPHINE SULFATE PRN MG: 4 INJECTION, SOLUTION INTRAMUSCULAR; INTRAVENOUS at 13:32

## 2017-05-31 NOTE — PN
Progress Note





- Progress Note


SOAP: 


Subjective:


[]Patient seen today OOB in chair with legs in a dependent position.  He is 

awaiting dressing change on both lower extremities.  He states his pain is 

better managed today. 








Objective:


[]


 Vital Signs











Temp  98.6 F   05/31/17 11:40


 


Pulse  67   05/31/17 11:40


 


Resp  20   05/31/17 13:32


 


BP  105/57   05/31/17 11:40


 


Pulse Ox  96   05/31/17 11:40








 Intake & Output











 05/30/17 05/31/17 05/31/17





 18:59 06:59 18:59


 


Intake Total 0 1720 880


 


Output Total  500 200


 


Balance 0 1220 680


 


Weight 367 lb  


 


Intake:   


 


  IV Fluids  1060 


 


    ABX - VANCOMYCIN  1000 


 


    NS (0.9%)  60 


 


  Oral 0 660 880


 


Output:   


 


  Urine  500 200


 


Other:   


 


  Estimated Void   Medium


 


  # Bowel Movements  0 0


 


  # Voids   1








 Laboratory Results - last 24 hr











  05/30/17 05/30/17 05/30/17





  14:31 14:31 14:31


 


WBC  11.7 H  


 


RBC  4.43  


 


Hgb  11.0 L  


 


Hct  35 L  


 


MCV  79 L  


 


MCH  25 L  


 


MCHC  31  


 


RDW  17 H  


 


Plt Count  320  


 


MPV  7 L  


 


Neut % (Auto)  76.1  


 


Lymph % (Auto)  9.3 L  


 


Mono % (Auto)  10.8 H  


 


Eos % (Auto)  2.9  


 


Baso % (Auto)  0.9  


 


Absolute Neuts (auto)  8.9 H  


 


Absolute Lymphs (auto)  1.1  


 


Absolute Monos (auto)  1.3 H  


 


Absolute Eos (auto)  0.3  


 


Absolute Basos (auto)  0.1  


 


Absolute Nucleated RBC  0  


 


Nucleated RBC %  0  


 


Sodium   137 


 


Potassium   4.5 


 


Chloride   96 L 


 


Carbon Dioxide   34 H 


 


Anion Gap   7 


 


BUN   33 H 


 


Creatinine   1.10 


 


Est GFR ( Amer)   88.1 


 


Est GFR (Non-Af Amer)   68.5 


 


BUN/Creatinine Ratio   30.0 H 


 


Glucose   104 H 


 


POC Glucose (mg/dL)   


 


Hemoglobin A1c    9.7 H


 


Calcium   9.3 


 


Total Bilirubin   0.40 


 


AST   11 L 


 


ALT   12 


 


Alkaline Phosphatase   69 


 


C-Reactive Protein   51.05 H 


 


Total Protein   7.3 


 


Albumin   3.5 


 


Globulin   3.8 


 


Albumin/Globulin Ratio   0.9 L 


 


Vancomycin Trough   














  05/30/17 05/30/17 05/31/17





  16:44 22:34 07:42


 


WBC   


 


RBC   


 


Hgb   


 


Hct   


 


MCV   


 


MCH   


 


MCHC   


 


RDW   


 


Plt Count   


 


MPV   


 


Neut % (Auto)   


 


Lymph % (Auto)   


 


Mono % (Auto)   


 


Eos % (Auto)   


 


Baso % (Auto)   


 


Absolute Neuts (auto)   


 


Absolute Lymphs (auto)   


 


Absolute Monos (auto)   


 


Absolute Eos (auto)   


 


Absolute Basos (auto)   


 


Absolute Nucleated RBC   


 


Nucleated RBC %   


 


Sodium   


 


Potassium   


 


Chloride   


 


Carbon Dioxide   


 


Anion Gap   


 


BUN   


 


Creatinine   


 


Est GFR ( Amer)   


 


Est GFR (Non-Af Amer)   


 


BUN/Creatinine Ratio   


 


Glucose   


 


POC Glucose (mg/dL)  168 H  228 H  265 H


 


Hemoglobin A1c   


 


Calcium   


 


Total Bilirubin   


 


AST   


 


ALT   


 


Alkaline Phosphatase   


 


C-Reactive Protein   


 


Total Protein   


 


Albumin   


 


Globulin   


 


Albumin/Globulin Ratio   


 


Vancomycin Trough   














  05/31/17 05/31/17 05/31/17





  07:54 07:55 07:55


 


WBC    11.4 H


 


RBC    4.49


 


Hgb    11.1 L


 


Hct    36 L


 


MCV    79 L


 


MCH    25 L


 


MCHC    31


 


RDW    17 H


 


Plt Count    322


 


MPV    8


 


Neut % (Auto)    86.5 H


 


Lymph % (Auto)    5.2 L


 


Mono % (Auto)    6.1


 


Eos % (Auto)    1.5


 


Baso % (Auto)    0.7


 


Absolute Neuts (auto)    9.8 H


 


Absolute Lymphs (auto)    0.6 L


 


Absolute Monos (auto)    0.7


 


Absolute Eos (auto)    0.2


 


Absolute Basos (auto)    0.1


 


Absolute Nucleated RBC    0


 


Nucleated RBC %    0


 


Sodium   134 


 


Potassium   4.9 


 


Chloride   93 L 


 


Carbon Dioxide   35 H 


 


Anion Gap   6 


 


BUN   26 H 


 


Creatinine   0.93 


 


Est GFR ( Amer)   107.0 


 


Est GFR (Non-Af Amer)   83.2 


 


BUN/Creatinine Ratio   28.0 H 


 


Glucose   250 H 


 


POC Glucose (mg/dL)   


 


Hemoglobin A1c   


 


Calcium   9.1 


 


Total Bilirubin   


 


AST   


 


ALT   


 


Alkaline Phosphatase   


 


C-Reactive Protein   


 


Total Protein   


 


Albumin   


 


Globulin   


 


Albumin/Globulin Ratio   


 


Vancomycin Trough  13.7  














  05/31/17





  11:00


 


WBC 


 


RBC 


 


Hgb 


 


Hct 


 


MCV 


 


MCH 


 


MCHC 


 


RDW 


 


Plt Count 


 


MPV 


 


Neut % (Auto) 


 


Lymph % (Auto) 


 


Mono % (Auto) 


 


Eos % (Auto) 


 


Baso % (Auto) 


 


Absolute Neuts (auto) 


 


Absolute Lymphs (auto) 


 


Absolute Monos (auto) 


 


Absolute Eos (auto) 


 


Absolute Basos (auto) 


 


Absolute Nucleated RBC 


 


Nucleated RBC % 


 


Sodium 


 


Potassium 


 


Chloride 


 


Carbon Dioxide 


 


Anion Gap 


 


BUN 


 


Creatinine 


 


Est GFR ( Amer) 


 


Est GFR (Non-Af Amer) 


 


BUN/Creatinine Ratio 


 


Glucose 


 


POC Glucose (mg/dL)  319 H


 


Hemoglobin A1c 


 


Calcium 


 


Total Bilirubin 


 


AST 


 


ALT 


 


Alkaline Phosphatase 


 


C-Reactive Protein 


 


Total Protein 


 


Albumin 


 


Globulin 


 


Albumin/Globulin Ratio 


 


Vancomycin Trough 











Dressings were removed from bilateral lower extremities with help of nursing 

staff.


Both legs continue to weep serous fluid from mid calf down to ankles


Right foot G toe blister is dry, 2nd toe dorsally  and dorsum of the foot also 

weepy and erythematous


Left foot large plantar heel ulcer noted with moderately macerated base


slight foul odor noted 





Silver alginate dressings with ABD and ACE wraps applied as directed to 

bilateral low extremities











Assessment:


[]Bilateral LE  weepy lympadema


  Bilateral LE cellulitis


  Chronic Osteomyelitis left calcaneous, with large plantar ulceration


  Right great toe ulceration/ cellulitis  








Plan:


[]Elevation lower extremites 


   Dressing changes 1-2 times daily as instructed


   Vancomycin as recommended by Dr. Mejia


   Continued diuresis and medical optimization


   Will continue to follow

## 2017-05-31 NOTE — PN
Subjective


Date of Service: 05/31/17


Interval History: 





Patient reports some improvement in pain control, but this is still a major 

concern.  No fevers, CP, SOB, abd pain, n/v.  Noted hypoxia overnight.





Objective


Active Medications: 








Acetaminophen (Tylenol Tab*)  650 mg PO Q4H PRN


   PRN Reason: FEVER/PAIN


   Last Admin: 05/30/17 14:57 Dose:  650 mg


Amlodipine Besylate (Norvasc Tab*)  5 mg PO BEDTIME [facility]


   Last Admin: 05/30/17 19:51 Dose:  5 mg


Atenolol (Tenormin Tab*)  50 mg PO QAM [facility]


   Last Admin: 05/31/17 08:25 Dose:  50 mg


Atorvastatin Calcium (Lipitor*)  80 mg PO DAILY [facility]


   Last Admin: 05/31/17 08:25 Dose:  80 mg


Calcium Carbonate (Tums*)  1,000 mg PO Q4H PRN


   PRN Reason: reflux


   Last Admin: 05/31/17 10:55 Dose:  1,000 mg


Dextrose (D50w Syringe 50 Ml*)  12.5 gm IV PUSH .FOR FS < 60 - SS PRN


   PRN Reason: FS < 60


Furosemide (Lasix Tab*)  20 mg PO DAILY [facility]


Heparin Sodium (Porcine) (Heparin Vial(*))  5,000 units SUBCUT Q8HR [facility]


   Last Admin: 05/31/17 13:36 Dose:  5,000 units


Vancomycin HCl 1,250 mg/ (Sodium Chloride)  250 mls @ 166.667 mls/hr IVPB Q8H 

[facility]


   Last Admin: 05/31/17 08:02 Dose:  166.667 mls/hr


Insulin Glargine (Lantus(*))  38 units SUBCUT BID [facility]


   Last Admin: 05/31/17 09:19 Dose:  38 units


Insulin Human Lispro (Humalog*)  0 units SUBCUT AC [facility]


   PRN Reason: Protocol


   Last Admin: 05/31/17 12:49 Dose:  12 units


Lactobacillus Rhamnosus (Culturelle*)  1 cap PO BID [facility]


   Last Admin: 05/31/17 08:25 Dose:  1 cap


Lisinopril (Prinivil Tab*)  30 mg PO DAILY [facility]


   Last Admin: 05/31/17 08:25 Dose:  30 mg


Metformin HCl (Glucophage*)  1,000 mg PO BID WITH MEALS [facility]


   Last Admin: 05/31/17 08:25 Dose:  1,000 mg


Morphine Sulfate (Morphine Inj (Syringe)*)  4 mg IV Q4H PRN


   PRN Reason: PAIN


   Last Admin: 05/31/17 13:32 Dose:  4 mg


Omeprazole (Prilosec Cap*)  20 mg PO DAILY@0730 [facility]


   Last Admin: 05/31/17 07:30 Dose:  20 mg


Ondansetron HCl (Zofran Inj*)  4 mg IV Q4H PRN


   PRN Reason: NAUSEA/VOMITING


   Last Admin: 05/31/17 13:36 Dose:  4 mg


Oxycodone HCl (Roxycodone Tab*)  10 mg PO Q4H PRN


   PRN Reason: PAIN


   Last Admin: 05/31/17 11:35 Dose:  10 mg


Oxycodone HCl (Oxycontin(*))  20 mg PO BID [facility]


Pharmacy Profile Note (Vancomycin Trough Check)  1 note FOLLOW UP ONCE ONE


   Stop: 06/01/17 07:31











Vital Signs:











Temp Pulse Resp BP Pulse Ox


 


 98.6 F   67   20   105/57   96 


 


 05/31/17 11:40  05/31/17 11:40  05/31/17 13:32  05/31/17 11:40  05/31/17 11:40











Oxygen Devices in Use Now: None


Appearance: Slightly uncomfortable appearing middle aged gentleman in NAD


Respiratory: Symmetrical Chest Expansion and Respiratory Effort, Clear to 

Auscultation


Cardiovascular: NL Sounds; No Murmurs; No JVD, RRR


Abdominal: NL Sounds; No Tenderness; No Distention


Extremities: - - 2+ LE edema, lower legs covered in ACE wraps


Neurological: Alert and Oriented x 3


Result Diagrams: 


 05/31/17 07:55





 05/31/17 07:55


Diagnostic Imaging: 





Echo 5/8/17 - LVEF 55-60%, mod LVH, diastolic dysfunction, mild to mod reduced 

RV function, unable to assess RV function





Assess/Plan/Problems-Billing


Assessment: 





This is a 60 yo gentleman with known osteomyelitis of L calcaneus, poorly 

controlled diabetes, chronic RV failure and diastolic dysfunction with 

significant LE edema as well as untreated DAVID, HTN, COPD, morbid obesity and 

chronic pain who was referred to the hospital by Dr Kuhn 





- Patient Problems


(1) Cellulitis


Comment: 


With chronic osteomyelitis and non-healing DM ulcer


Recently treated with 6-8 weeks of Vanco and oral doxycycline


Vanco resumed per ID recommendations


   





(2) Osteomyelitis


Comment: 


Chronic L calcaneal osteomyelitis


Ortho surgery team following, unsure of surgical plan, will work to clarify 

with Dr Kuhn   





(3) Chronic heart failure


Comment: 


Review of echo from earlier this month shows RV failure and diastolic 

dysfunction which appears to be chronic in nature.  RV pressure unable to be 

measured, but assumed he has pulm HTN from untreated DAVID


Start diuretics and patient agreed to trying CPAP again   





(4) Lymphedema


Comment: 


Chronic


Likely related to RV and diastolic failure


Cont compression and will start diuresis   





(5) DAVID (obstructive sleep apnea)


Comment: 


Non-compliant with CPAP at home


He reports that the machine woke him up so he stopped using it


He is willing to try CPAP again during his hospital stay after explanation as 

to how it may be contributing to his edema and poorly healing wounds   





(6) COPD (chronic obstructive pulmonary disease)


Comment: 


No acute exacerbation   





(7) Diabetes


Comment: 


IDDM 


HgbA1c 9.7%


Increase Lantus, cont SS mealtime coverage with Humalog   





(8) Chronic pain


Comment: 


Chronic back, knee, shoulder, and leg pain


Prescribed Suboxone by Dr Wilder


Spoke with Dr Wilder at admission


He had incomplete pain relief from Suboxone, which has been stopped


Incomplete relief from prn oxycodone


Start oxycontin with cont prn oxycodone


Referral to pain management at discharge   





(9) Hyperlipidemia


Comment: 


Cont statin   





(10) Morbid obesity


Comment: 


BMI 50


Bariatric surgery within the last 12 months with 65# weight loss   





(11) HTN (hypertension)


Comment: 


Normotensive


Cont home antihypertensives   





(12) Full code status








(13) DVT prophylaxis


Comment: 


HSQ   


Status and Disposition: 





Inpatient.  Discharge planning will depend on surgical plan, call has been 

placed to Dr Kuhn to discuss

## 2017-06-01 LAB
ANION GAP SERPL CALC-SCNC: 5 MMOL/L (ref 2–11)
ANION GAP SERPL CALC-SCNC: 6 MMOL/L (ref 2–11)
BUN SERPL-MCNC: 25 MG/DL (ref 6–24)
BUN SERPL-MCNC: 27 MG/DL (ref 6–24)
BUN/CREAT SERPL: 26.3 (ref 8–20)
BUN/CREAT SERPL: 29.3 (ref 8–20)
CALCIUM SERPL-MCNC: 9.2 MG/DL (ref 8.6–10.3)
CALCIUM SERPL-MCNC: 9.4 MG/DL (ref 8.6–10.3)
CHLORIDE SERPL-SCNC: 94 MMOL/L (ref 101–111)
CHLORIDE SERPL-SCNC: 95 MMOL/L (ref 101–111)
GLUCOSE SERPL-MCNC: 247 MG/DL (ref 70–100)
GLUCOSE SERPL-MCNC: 278 MG/DL (ref 70–100)
HCO3 SERPL-SCNC: 29 MMOL/L (ref 22–32)
HCO3 SERPL-SCNC: 33 MMOL/L (ref 22–32)
HCT VFR BLD AUTO: 35 % (ref 42–52)
HGB BLD-MCNC: 11 G/DL (ref 14–18)
MCH RBC QN AUTO: 25 PG (ref 27–31)
MCHC RBC AUTO-ENTMCNC: 31 G/DL (ref 31–36)
MCV RBC AUTO: 80 FL (ref 80–94)
POTASSIUM SERPL-SCNC: 4.9 MMOL/L (ref 3.5–5)
POTASSIUM SERPL-SCNC: 5.2 MMOL/L (ref 3.5–5)
RBC # BLD AUTO: 4.43 10^6/UL (ref 4–5.4)
SODIUM SERPL-SCNC: 130 MMOL/L (ref 133–145)
SODIUM SERPL-SCNC: 132 MMOL/L (ref 133–145)
WBC # BLD AUTO: 9.6 10^3/UL (ref 3.5–10.8)

## 2017-06-01 RX ADMIN — ATENOLOL SCH MG: 50 TABLET ORAL at 08:36

## 2017-06-01 RX ADMIN — FUROSEMIDE SCH MG: 10 INJECTION, SOLUTION INTRAMUSCULAR; INTRAVENOUS at 16:24

## 2017-06-01 RX ADMIN — ACETAMINOPHEN PRN MG: 325 TABLET ORAL at 16:24

## 2017-06-01 RX ADMIN — OXYCODONE HYDROCHLORIDE PRN MG: 5 CAPSULE ORAL at 06:14

## 2017-06-01 RX ADMIN — OXYCODONE HYDROCHLORIDE SCH MG: 20 TABLET, FILM COATED, EXTENDED RELEASE ORAL at 08:39

## 2017-06-01 RX ADMIN — METFORMIN HYDROCHLORIDE SCH MG: 1000 TABLET, FILM COATED ORAL at 08:36

## 2017-06-01 RX ADMIN — Medication SCH CAP: at 08:36

## 2017-06-01 RX ADMIN — HEPARIN SODIUM SCH UNITS: 5000 INJECTION INTRAVENOUS; SUBCUTANEOUS at 21:26

## 2017-06-01 RX ADMIN — VANCOMYCIN HYDROCHLORIDE SCH MLS/HR: 1 INJECTION, POWDER, LYOPHILIZED, FOR SOLUTION INTRAVENOUS at 08:36

## 2017-06-01 RX ADMIN — NYSTATIN SCH APPLIC: 100000 CREAM TOPICAL at 21:25

## 2017-06-01 RX ADMIN — HEPARIN SODIUM SCH UNITS: 5000 INJECTION INTRAVENOUS; SUBCUTANEOUS at 13:24

## 2017-06-01 RX ADMIN — OMEPRAZOLE SCH MG: 20 CAPSULE, DELAYED RELEASE ORAL at 08:36

## 2017-06-01 RX ADMIN — INSULIN GLARGINE SCH UNITS: 100 INJECTION, SOLUTION SUBCUTANEOUS at 08:35

## 2017-06-01 RX ADMIN — AMLODIPINE BESYLATE SCH MG: 5 TABLET ORAL at 21:25

## 2017-06-01 RX ADMIN — INSULIN GLARGINE SCH UNITS: 100 INJECTION, SOLUTION SUBCUTANEOUS at 21:28

## 2017-06-01 RX ADMIN — MORPHINE SULFATE PRN MG: 4 INJECTION, SOLUTION INTRAMUSCULAR; INTRAVENOUS at 21:27

## 2017-06-01 RX ADMIN — INSULIN LISPRO SCH UNITS: 100 INJECTION, SOLUTION INTRAVENOUS; SUBCUTANEOUS at 13:23

## 2017-06-01 RX ADMIN — FUROSEMIDE SCH MG: 20 TABLET ORAL at 08:36

## 2017-06-01 RX ADMIN — NYSTATIN SCH APPLIC: 100000 CREAM TOPICAL at 13:24

## 2017-06-01 RX ADMIN — LISINOPRIL SCH MG: 10 TABLET ORAL at 08:37

## 2017-06-01 RX ADMIN — MORPHINE SULFATE PRN MG: 4 INJECTION, SOLUTION INTRAMUSCULAR; INTRAVENOUS at 15:00

## 2017-06-01 RX ADMIN — MORPHINE SULFATE PRN MG: 4 INJECTION, SOLUTION INTRAMUSCULAR; INTRAVENOUS at 10:26

## 2017-06-01 RX ADMIN — INSULIN LISPRO SCH: 100 INJECTION, SOLUTION INTRAVENOUS; SUBCUTANEOUS at 17:15

## 2017-06-01 RX ADMIN — Medication SCH CAP: at 21:25

## 2017-06-01 RX ADMIN — CALCIUM CARBONATE (ANTACID) CHEW TAB 500 MG PRN MG: 500 CHEW TAB at 06:14

## 2017-06-01 RX ADMIN — INSULIN LISPRO SCH UNITS: 100 INJECTION, SOLUTION INTRAVENOUS; SUBCUTANEOUS at 17:23

## 2017-06-01 RX ADMIN — HEPARIN SODIUM SCH UNITS: 5000 INJECTION INTRAVENOUS; SUBCUTANEOUS at 06:13

## 2017-06-01 RX ADMIN — INSULIN LISPRO SCH UNITS: 100 INJECTION, SOLUTION INTRAVENOUS; SUBCUTANEOUS at 08:35

## 2017-06-01 RX ADMIN — METFORMIN HYDROCHLORIDE SCH MG: 1000 TABLET, FILM COATED ORAL at 17:23

## 2017-06-01 RX ADMIN — ATORVASTATIN CALCIUM SCH MG: 80 TABLET, FILM COATED ORAL at 08:36

## 2017-06-01 RX ADMIN — OXYCODONE HYDROCHLORIDE SCH MG: 20 TABLET, FILM COATED, EXTENDED RELEASE ORAL at 21:30

## 2017-06-01 RX ADMIN — VANCOMYCIN HYDROCHLORIDE SCH MLS/HR: 1 INJECTION, POWDER, LYOPHILIZED, FOR SOLUTION INTRAVENOUS at 16:26

## 2017-06-01 NOTE — PN
Subjective


Date of Service: 06/01/17


Interval History: 





Patient reports that he was unable to tolerate CPAP last night, it caused 

severe heart burn.  He worn O2 overnight and stated he slept well.  He refused 

the MRI last night due to pain and agreed this am, but only able to tolerate 

the L side.  He is agreeable to try again with more pain medication to the get 

the R imaged as well. 





He was started on Lasix but states he has not diuressed significantly with 

that.  Denies CP and SOB.





Objective


Active Medications: 








Acetaminophen (Tylenol Tab*)  650 mg PO Q4H PRN


   PRN Reason: FEVER/PAIN


   Last Admin: 05/30/17 14:57 Dose:  650 mg


Amlodipine Besylate (Norvasc Tab*)  5 mg PO BEDTIME [facility]


   Last Admin: 05/31/17 21:58 Dose:  5 mg


Atenolol (Tenormin Tab*)  50 mg PO QAM [facility]


   Last Admin: 06/01/17 08:36 Dose:  50 mg


Atorvastatin Calcium (Lipitor*)  80 mg PO DAILY [facility]


   Last Admin: 06/01/17 08:36 Dose:  80 mg


Calcium Carbonate (Tums*)  1,000 mg PO Q4H PRN


   PRN Reason: reflux


   Last Admin: 06/01/17 06:14 Dose:  1,000 mg


Dextrose (D50w Syringe 50 Ml*)  12.5 gm IV PUSH .FOR FS < 60 - SS PRN


   PRN Reason: FS < 60


Furosemide (Lasix Tab*)  20 mg PO DAILY [facility]


   Last Admin: 06/01/17 08:36 Dose:  20 mg


Heparin Sodium (Porcine) (Heparin Vial(*))  5,000 units SUBCUT Q8HR [facility]


   Last Admin: 06/01/17 13:24 Dose:  5,000 units


Vancomycin HCl 1,250 mg/ (Sodium Chloride)  250 mls @ 166.667 mls/hr IVPB Q8H 

[facility]


   Last Admin: 06/01/17 08:36 Dose:  166.667 mls/hr


Insulin Glargine (Lantus(*))  45 units SUBCUT BID [facility]


   Last Admin: 06/01/17 08:35 Dose:  45 units


Insulin Human Lispro (Humalog*)  15 units SUBCUT AC [facility]


   PRN Reason: Protocol


   Last Admin: 06/01/17 13:23 Dose:  15 units


Lactobacillus Rhamnosus (Culturelle*)  1 cap PO BID [facility]


   Last Admin: 06/01/17 08:36 Dose:  1 cap


Lisinopril (Prinivil Tab*)  30 mg PO DAILY [facility]


   Last Admin: 06/01/17 08:37 Dose:  30 mg


Metformin HCl (Glucophage*)  1,000 mg PO BID WITH MEALS [facility]


   Last Admin: 06/01/17 08:36 Dose:  1,000 mg


Morphine Sulfate (Morphine Inj (Syringe)*)  4 mg IV Q2H PRN


   PRN Reason: PAIN


   Last Admin: 06/01/17 10:26 Dose:  4 mg


Nystatin (Nystatin Cream*)  1 applic TOPICAL BID [facility]


   Last Admin: 06/01/17 13:24 Dose:  1 applic


Omeprazole (Prilosec Cap*)  20 mg PO DAILY@0730 [facility]


   Last Admin: 06/01/17 08:36 Dose:  20 mg


Ondansetron HCl (Zofran Inj*)  4 mg IV Q4H PRN


   PRN Reason: NAUSEA/VOMITING


   Last Admin: 05/31/17 23:04 Dose:  4 mg


Oxycodone HCl (Roxycodone Tab*)  10 mg PO Q4H PRN


   PRN Reason: PAIN


   Last Admin: 06/01/17 06:14 Dose:  10 mg


Oxycodone HCl (Oxycontin(*))  20 mg PO BID [facility]


   Last Admin: 06/01/17 08:39 Dose:  20 mg


Pharmacy Consult (Vancomycin Per Pharmacy*)  1 note FOLLOW UP . PRN


   PRN Reason: PER PROTOCOL











Vital Signs:











Temp Pulse Resp BP Pulse Ox


 


 98.6 F   73   17   116/76   94 


 


 06/01/17 08:04  06/01/17 08:04  06/01/17 11:58  06/01/17 08:04  06/01/17 08:04











Oxygen Devices in Use Now: None


Appearance: Well appearing middle aged gentleman in [facility].  Lying in recliner 

with legs elevated.


Respiratory: Symmetrical Chest Expansion and Respiratory Effort, Clear to 

Auscultation


Cardiovascular: NL Sounds; No Murmurs; No JVD, RRR


Extremities: - - both legs covered in ACE wraps, obvious firm edema still 

present and mostly unchanged


Neurological: Alert and Oriented x 3


Result Diagrams: 


 06/01/17 07:25





 06/01/17 12:35


Microbiology and Other Data: 


 Microbiology











 05/31/17 07:54 Aerobic Blood Culture - Preliminary





 Blood Venous    No Growth Day 1





 Anaerobic Blood Culture - Preliminary





    No Growth Day 1


 


 05/30/17 23:34 Aerobic Blood Culture - Preliminary





 Blood Venous    No Growth Day 1





 Anaerobic Blood Culture - Preliminary





    No Growth Day 1











Diagnostic Imaging: 





Echo 5/8/17 - LVEF 55-60%, mod LVH, diastolic dysfunction, mild to mod reduced 

RV function, unable to assess RV function





MRI L ankle - osteomyelitis of the L calcaneus that looks to have significantly 

advanced when compared to 3/2017





Assess/Plan/Problems-Billing


Assessment: 





This is a 60 yo gentleman with known osteomyelitis of L calcaneus, poorly 

controlled diabetes, chronic RV failure and diastolic dysfunction with 

significant LE edema as well as untreated DAVID, HTN, COPD, morbid obesity and 

chronic pain who was referred to the hospital by Dr Kuhn 





- Patient Problems


(1) Cellulitis


Comment: 


With chronic osteomyelitis and non-healing DM ulcer


Recently treated with 6-8 weeks of Vanco and oral doxycycline


Vanco resumed per ID recommendations


   





(2) Osteomyelitis


Comment: 


Chronic L calcaneal osteomyelitis


Discussed case with Dr Kuhn yesterday


Repeat MRI completed today which shows rather significant progression of the 

osteomyelitis since March


BKA appears to be indicated, Dr Kuhn still to re-evaluate


MRI of the R foot is pending   





(3) Chronic heart failure


Comment: 


Review of echo from earlier this month shows RV failure and diastolic 

dysfunction which appears to be chronic in nature.  RV pressure unable to be 

measured, but assumed he has pulm HTN from untreated DAVID


Attempted CPAP, but still unable to tolerate


Little diuresis with oral Lasix, will get more aggressive with IV Lasix   





(4) Lymphedema


Comment: 


Chronic


Likely related to RV and diastolic failure


Cont compression and will start diuresis   





(5) DAVID (obstructive sleep apnea)


Comment: 


Non-compliant with CPAP at home


He reports that the machine woke him up so he stopped using it


Attempted CPAP again last night, unable to tolerate   





(6) COPD (chronic obstructive pulmonary disease)


Comment: 


No acute exacerbation   





(7) Diabetes


Comment: 


IDDM 


HgbA1c 9.7%


Increased Lantus, will also increase mealtime Humalog coverage   





(8) Chronic pain


Comment: 


Chronic back, knee, shoulder, and leg pain


Prescribed Suboxone by Dr Wilder


Spoke with Dr Wilder at admission


He had incomplete pain relief from Suboxone, which has been stopped


Incomplete relief from prn oxycodone


Start oxycontin with cont prn oxycodone


Referral to pain management at discharge   





(9) Hyperlipidemia


Comment: 


Cont statin   





(10) Morbid obesity


Comment: 


BMI 50


Bariatric surgery within the last 12 months with 65# weight loss   





(11) HTN (hypertension)


Comment: 


Normotensive


Cont home antihypertensives   





(12) Full code status








(13) DVT prophylaxis


Comment: 


HSQ   


Status and Disposition: 





Inpatient.

## 2017-06-01 NOTE — PN
Progress Note





- Progress Note


SOAP: 


Subjective:


[]Patient seen OOB sitting in chair with BLE in dependent position.  No new 

complaints.  He understands the gravity of the situation in his lower 

extremities, most notably the left heel, which shows worsening of the 

osteomyelitis in the calcaneous.  He continues to have the dressings changed 1-

2 times daily and continue to saturate through the ABDs and ACE wraps. 








Objective:


[]


 Vital Signs











Temp  98.6 F   06/01/17 08:04


 


Pulse  73   06/01/17 08:04


 


Resp  17   06/01/17 11:58


 


BP  116/76   06/01/17 08:04


 


Pulse Ox  94   06/01/17 08:04








 Intake & Output











 05/31/17 06/01/17 06/01/17





 18:59 06:59 18:59


 


Intake Total 1620 1850 640


 


Output Total 200 2300 1050


 


Balance 1420 -450 -410


 


Weight   367 lb


 


Intake:   


 


  IVPB 260 250 


 


    ABX - VANCOMYCIN 260 250 


 


  Oral 1360 1600 640


 


Output:   


 


  Urine 200 2300 1050


 


Other:   


 


  Estimated Void Medium Medium 


 


  # Bowel Movements 0 0 


 


  # Voids 3 3 








 Laboratory Results - last 24 hr











  05/31/17 05/31/17 06/01/17





  17:20 21:09 07:25


 


WBC   


 


RBC   


 


Hgb   


 


Hct   


 


MCV   


 


MCH   


 


MCHC   


 


RDW   


 


Plt Count   


 


MPV   


 


Neut % (Auto)   


 


Lymph % (Auto)   


 


Mono % (Auto)   


 


Eos % (Auto)   


 


Baso % (Auto)   


 


Absolute Neuts (auto)   


 


Absolute Lymphs (auto)   


 


Absolute Monos (auto)   


 


Absolute Eos (auto)   


 


Absolute Basos (auto)   


 


Absolute Nucleated RBC   


 


Nucleated RBC %   


 


Sodium   


 


Potassium   


 


Chloride   


 


Carbon Dioxide   


 


Anion Gap   


 


BUN   


 


Creatinine   


 


Est GFR ( Amer)   


 


Est GFR (Non-Af Amer)   


 


BUN/Creatinine Ratio   


 


Glucose   


 


POC Glucose (mg/dL)  231 H  269 H 


 


Calcium   


 


C-Reactive Protein   


 


Vancomycin Trough    15.5














  06/01/17 06/01/17 06/01/17





  07:25 07:25 07:57


 


WBC  9.6  


 


RBC  4.43  


 


Hgb  11.0 L  


 


Hct  35 L  


 


MCV  80  


 


MCH  25 L  


 


MCHC  31  


 


RDW  17 H  


 


Plt Count  271  


 


MPV  8  


 


Neut % (Auto)  79.6  


 


Lymph % (Auto)  7.4 L  


 


Mono % (Auto)  10.1 H  


 


Eos % (Auto)  2.2  


 


Baso % (Auto)  0.7  


 


Absolute Neuts (auto)  7.7  


 


Absolute Lymphs (auto)  0.7 L  


 


Absolute Monos (auto)  1.0 H  


 


Absolute Eos (auto)  0.2  


 


Absolute Basos (auto)  0.1  


 


Absolute Nucleated RBC  0  


 


Nucleated RBC %  0  


 


Sodium   130 L 


 


Potassium   5.2 H 


 


Chloride   95 L 


 


Carbon Dioxide   29 


 


Anion Gap   6 


 


BUN   27 H 


 


Creatinine   0.92 


 


Est GFR ( Amer)   108.3 


 


Est GFR (Non-Af Amer)   84.2 


 


BUN/Creatinine Ratio   29.3 H 


 


Glucose   278 H 


 


POC Glucose (mg/dL)    306 H


 


Calcium   9.4 


 


C-Reactive Protein   39.00 H 


 


Vancomycin Trough   














  06/01/17





  12:35


 


WBC 


 


RBC 


 


Hgb 


 


Hct 


 


MCV 


 


MCH 


 


MCHC 


 


RDW 


 


Plt Count 


 


MPV 


 


Neut % (Auto) 


 


Lymph % (Auto) 


 


Mono % (Auto) 


 


Eos % (Auto) 


 


Baso % (Auto) 


 


Absolute Neuts (auto) 


 


Absolute Lymphs (auto) 


 


Absolute Monos (auto) 


 


Absolute Eos (auto) 


 


Absolute Basos (auto) 


 


Absolute Nucleated RBC 


 


Nucleated RBC % 


 


Sodium  132 L


 


Potassium  4.9


 


Chloride  94 L


 


Carbon Dioxide  33 H


 


Anion Gap  5


 


BUN  25 H


 


Creatinine  0.95


 


Est GFR ( Amer)  104.4


 


Est GFR (Non-Af Amer)  81.1


 


BUN/Creatinine Ratio  26.3 H


 


Glucose  247 H


 


POC Glucose (mg/dL) 


 


Calcium  9.2


 


C-Reactive Protein 


 


Vancomycin Trough 





Foul odor noted upon entering the room


BLE LE ulcers and weeping lymphadema unchanged from yesterday








Assessment:


[]Left calcaneal osteomyelitis- worsening


  Right great toe ulcer- new MRI pending


  weepy lymphedema  BL LE's mid calf down








Plan:


[]Continue diuresis


  Continue dressing changes prn saturation, minimally BID


  Continue Vancomycin 


  Encourage elevation of lower extremities 


  Await MRI Right LE


  Probable BKA Left LE, await Dr. Kuhn's final recommendations

## 2017-06-01 NOTE — RAD
INDICATION: Calcaneal osteomyelitis, reevaluation.



COMPARISON:  Comparison is made with a prior MRI of the left ankle from March 22, 2017.



TECHNIQUE: Axial, sagittal and coronal T1 and T2-weighted images of the left ankle were

obtained.



FINDINGS: There is diffuse soft tissue swelling and fluid tracking within the subcutaneous

tissues which is most prominent along the dorsal aspect of the foot.



There is a moderate size area of bone marrow edema present in the posterior inferior

aspect of the calcaneus. This involves a larger portion of the calcaneus than on the prior

study and would be most consistent with osteomyelitis. No abscess is seen.



IMPRESSION:  FINDINGS MOST CONSISTENT WITH CALCANEAL OSTEOMYELITIS CENTERED IN THE

POSTERIOR INFERIOR ASPECT OF THE CALCANEUS DEMONSTRATING INTERVAL PROGRESSION.

## 2017-06-02 LAB
ANION GAP SERPL CALC-SCNC: 4 MMOL/L (ref 2–11)
BUN SERPL-MCNC: 18 MG/DL (ref 6–24)
BUN/CREAT SERPL: 22.8 (ref 8–20)
CALCIUM SERPL-MCNC: 9.5 MG/DL (ref 8.6–10.3)
CHLORIDE SERPL-SCNC: 93 MMOL/L (ref 101–111)
GLUCOSE SERPL-MCNC: 162 MG/DL (ref 70–100)
HCO3 SERPL-SCNC: 37 MMOL/L (ref 22–32)
HCT VFR BLD AUTO: 36 % (ref 42–52)
HGB BLD-MCNC: 11.3 G/DL (ref 14–18)
MCH RBC QN AUTO: 25 PG (ref 27–31)
MCHC RBC AUTO-ENTMCNC: 32 G/DL (ref 31–36)
MCV RBC AUTO: 78 FL (ref 80–94)
POTASSIUM SERPL-SCNC: 4.4 MMOL/L (ref 3.5–5)
RBC # BLD AUTO: 4.56 10^6/UL (ref 4–5.4)
SODIUM SERPL-SCNC: 134 MMOL/L (ref 133–145)
WBC # BLD AUTO: 9.5 10^3/UL (ref 3.5–10.8)

## 2017-06-02 RX ADMIN — HEPARIN SODIUM SCH UNITS: 5000 INJECTION INTRAVENOUS; SUBCUTANEOUS at 13:44

## 2017-06-02 RX ADMIN — MORPHINE SULFATE PRN MG: 4 INJECTION, SOLUTION INTRAMUSCULAR; INTRAVENOUS at 09:57

## 2017-06-02 RX ADMIN — Medication SCH CAP: at 09:58

## 2017-06-02 RX ADMIN — METFORMIN HYDROCHLORIDE SCH MG: 1000 TABLET, FILM COATED ORAL at 17:07

## 2017-06-02 RX ADMIN — CALCIUM CARBONATE (ANTACID) CHEW TAB 500 MG PRN MG: 500 CHEW TAB at 23:17

## 2017-06-02 RX ADMIN — HEPARIN SODIUM SCH UNITS: 5000 INJECTION INTRAVENOUS; SUBCUTANEOUS at 06:26

## 2017-06-02 RX ADMIN — INSULIN LISPRO SCH UNITS: 100 INJECTION, SOLUTION INTRAVENOUS; SUBCUTANEOUS at 08:53

## 2017-06-02 RX ADMIN — OMEPRAZOLE SCH MG: 20 CAPSULE, DELAYED RELEASE ORAL at 08:50

## 2017-06-02 RX ADMIN — METFORMIN HYDROCHLORIDE SCH MG: 1000 TABLET, FILM COATED ORAL at 08:51

## 2017-06-02 RX ADMIN — INSULIN LISPRO SCH UNITS: 100 INJECTION, SOLUTION INTRAVENOUS; SUBCUTANEOUS at 17:08

## 2017-06-02 RX ADMIN — HEPARIN SODIUM SCH UNITS: 5000 INJECTION INTRAVENOUS; SUBCUTANEOUS at 21:28

## 2017-06-02 RX ADMIN — VANCOMYCIN HYDROCHLORIDE SCH MLS/HR: 1 INJECTION, POWDER, LYOPHILIZED, FOR SOLUTION INTRAVENOUS at 02:24

## 2017-06-02 RX ADMIN — INSULIN LISPRO SCH UNITS: 100 INJECTION, SOLUTION INTRAVENOUS; SUBCUTANEOUS at 12:52

## 2017-06-02 RX ADMIN — ATORVASTATIN CALCIUM SCH MG: 80 TABLET, FILM COATED ORAL at 09:06

## 2017-06-02 RX ADMIN — INSULIN GLARGINE SCH UNITS: 100 INJECTION, SOLUTION SUBCUTANEOUS at 21:27

## 2017-06-02 RX ADMIN — OXYCODONE HYDROCHLORIDE SCH MG: 20 TABLET, FILM COATED, EXTENDED RELEASE ORAL at 08:51

## 2017-06-02 RX ADMIN — OXYCODONE HYDROCHLORIDE PRN MG: 5 CAPSULE ORAL at 19:56

## 2017-06-02 RX ADMIN — NYSTATIN SCH APPLIC: 100000 CREAM TOPICAL at 09:58

## 2017-06-02 RX ADMIN — MORPHINE SULFATE PRN MG: 4 INJECTION, SOLUTION INTRAMUSCULAR; INTRAVENOUS at 12:31

## 2017-06-02 RX ADMIN — ATENOLOL SCH MG: 50 TABLET ORAL at 08:51

## 2017-06-02 RX ADMIN — OXYCODONE HYDROCHLORIDE PRN MG: 5 CAPSULE ORAL at 14:02

## 2017-06-02 RX ADMIN — Medication SCH ML: at 21:29

## 2017-06-02 RX ADMIN — MORPHINE SULFATE PRN MG: 4 INJECTION, SOLUTION INTRAMUSCULAR; INTRAVENOUS at 21:18

## 2017-06-02 RX ADMIN — OXYCODONE HYDROCHLORIDE SCH MG: 20 TABLET, FILM COATED, EXTENDED RELEASE ORAL at 21:25

## 2017-06-02 RX ADMIN — Medication SCH CAP: at 23:04

## 2017-06-02 RX ADMIN — MORPHINE SULFATE PRN MG: 4 INJECTION, SOLUTION INTRAMUSCULAR; INTRAVENOUS at 18:10

## 2017-06-02 RX ADMIN — VANCOMYCIN HYDROCHLORIDE SCH MLS/HR: 1 INJECTION, POWDER, LYOPHILIZED, FOR SOLUTION INTRAVENOUS at 18:10

## 2017-06-02 RX ADMIN — LISINOPRIL SCH MG: 10 TABLET ORAL at 08:51

## 2017-06-02 RX ADMIN — NYSTATIN SCH APPLIC: 100000 CREAM TOPICAL at 23:00

## 2017-06-02 RX ADMIN — FUROSEMIDE SCH MG: 10 INJECTION, SOLUTION INTRAMUSCULAR; INTRAVENOUS at 09:58

## 2017-06-02 RX ADMIN — VANCOMYCIN HYDROCHLORIDE SCH MLS/HR: 1 INJECTION, POWDER, LYOPHILIZED, FOR SOLUTION INTRAVENOUS at 09:58

## 2017-06-02 RX ADMIN — AMLODIPINE BESYLATE SCH MG: 5 TABLET ORAL at 21:25

## 2017-06-02 RX ADMIN — MORPHINE SULFATE PRN MG: 4 INJECTION, SOLUTION INTRAMUSCULAR; INTRAVENOUS at 23:07

## 2017-06-02 RX ADMIN — MORPHINE SULFATE PRN MG: 4 INJECTION, SOLUTION INTRAMUSCULAR; INTRAVENOUS at 15:45

## 2017-06-02 RX ADMIN — INSULIN GLARGINE SCH UNITS: 100 INJECTION, SOLUTION SUBCUTANEOUS at 10:00

## 2017-06-02 NOTE — PN
PROGRESS NOTE:

 

DATE:  06/02/17 - ROOM #415

 

HISTORY OF PRESENT ILLNESS:  Shane is now going on his third day of his in-
house stay for lower extremity lymphedema, borderline cardiac failure, and 
cellulitis and osteomyelitis of the left foot.  As stated numerous times in the 
record, he is a 61- year-old gentleman with insulin-dependent diabetes, 
lymphedema, osteomyelitis of his left calcaneus, obstructive sleep apnea, 
chronic back pain, mostly back pain, hypertension.

 

He has been on IV vancomycin, oral doxycycline, and there has been no 
significant improvement in either lower extremity.  The right lower extremity 
recently becoming increasingly cellulitic, swollen, and weeping serous fluid.

 

He has had an MRI of the right forefoot, which has not shown osteomyelitis.  
The examination today at the bedside shows the right forefoot still 
significantly edematous and weeping serous fluid, but there is no significant 
skin failure in the first or second toe.  There is no significant cracking or 
ulceration noted clinically.

 

The left foot MRI does reveal significant calcaneal osteomyelitis.  The ulcer 
is large on the plantar aspect, and is clearly a burden to him in terms of 
recurrent bacteremia.  His examination of the left leg has shown significant 
lymphedema and thickened hyperkeratotic skin.  In my opinion, no foot salvage 
procedure would be feasible given the extreme lymphedema and poor generalized 
lymph drainage from the left dependent extremity.

 

Mr. Katz and I have discussed frankly his situation with the chronic calcaneal 
osteomyelitis, persistent pain in the left heel, poor control of his glucose, 
generalized metabolic decline.  I would recommend to him that he consider a 
transtibial amputation, which I think at least comes close to a good healing 
level in the proximal calf where his skin improves.  This is something that we 
could consider in the near future, potentially in 2 or 3 days, and I will 
contact his spouse to discuss this as well as the medical team.

 

 [id number] #: [id number]

MYA

## 2017-06-02 NOTE — PN
Progress Note





- Progress Note


SOAP: 


Subjective:


[]








Objective:


General -sitting in chair, c/o pain LE's 


MSK-  RLE- dressing removed, + muliple areas of weeping skin, + erythema 

diffuse from knee down, + edematous from knee down, dry, flaking skin.   

Dressing with silver alg applied.   LLE- + weeping skin from knee down, ~ 3.5 

cm ulceration at bottom heel, + severe tender to touch, + purulent drainage 

noted.   Redressed with silver alg, ABD, ACE.  





 Vital Signs











Temp  97.5 F   06/02/17 08:33


 


Pulse  84   06/02/17 08:33


 


Resp  16   06/02/17 10:57


 


BP  133/87   06/02/17 08:33


 


Pulse Ox  93   06/02/17 08:33








 Intake & Output











 06/01/17 06/02/17 06/02/17





 18:59 06:59 18:59


 


Intake Total 1340 2257 1020


 


Output Total 1800 1950 


 


Balance -


 


Weight 367 lb 362 lb 3.2 oz 


 


Intake:   


 


  IV Fluids  307 


 


    ABX - VANCOMYCIN  257 


 


    NS (0.9%)  50 


 


  IVPB  510 


 


    ABX - VANCOMYCIN  510 


 


  Oral 1340 1440 1020


 


Output:   


 


  Urine 1050 1950 


 


  [place] 750  


 


Other:   


 


  Estimated Void  Large 


 


  # Bowel Movements  0 


 


  # Voids  4 








 Laboratory Results - last 24 hr











  06/01/17 06/01/17 06/02/17





  12:35 16:32 06:42


 


WBC    9.5


 


RBC    4.56


 


Hgb    11.3 L


 


Hct    36 L


 


MCV    78 L


 


MCH    25 L


 


MCHC    32


 


RDW    16 H


 


Plt Count    333


 


MPV    8


 


Neut % (Auto)    77.7


 


Lymph % (Auto)    10.1 L


 


Mono % (Auto)    8.7


 


Eos % (Auto)    2.9


 


Baso % (Auto)    0.6


 


Absolute Neuts (auto)    7.4


 


Absolute Lymphs (auto)    1.0


 


Absolute Monos (auto)    0.8


 


Absolute Eos (auto)    0.3


 


Absolute Basos (auto)    0.1


 


Absolute Nucleated RBC    0


 


Nucleated RBC %    0


 


Sodium  132 L  


 


Potassium  4.9  


 


Chloride  94 L  


 


Carbon Dioxide  33 H  


 


Anion Gap  5  


 


BUN  25 H  


 


Creatinine  0.95  


 


Est GFR ( Amer)  104.4  


 


Est GFR (Non-Af Amer)  81.1  


 


BUN/Creatinine Ratio  26.3 H  


 


Glucose  247 H  


 


POC Glucose (mg/dL)   268 H 


 


Calcium  9.2  


 


C-Reactive Protein   














  06/02/17 06/02/17





  06:42 07:43


 


WBC  


 


RBC  


 


Hgb  


 


Hct  


 


MCV  


 


MCH  


 


MCHC  


 


RDW  


 


Plt Count  


 


MPV  


 


Neut % (Auto)  


 


Lymph % (Auto)  


 


Mono % (Auto)  


 


Eos % (Auto)  


 


Baso % (Auto)  


 


Absolute Neuts (auto)  


 


Absolute Lymphs (auto)  


 


Absolute Monos (auto)  


 


Absolute Eos (auto)  


 


Absolute Basos (auto)  


 


Absolute Nucleated RBC  


 


Nucleated RBC %  


 


Sodium  134 


 


Potassium  4.4 


 


Chloride  93 L 


 


Carbon Dioxide  37 H 


 


Anion Gap  4 


 


BUN  18 


 


Creatinine  0.79 


 


Est GFR ( Amer)  129.1 


 


Est GFR (Non-Af Amer)  100.4 


 


BUN/Creatinine Ratio  22.8 H 


 


Glucose  162 H 


 


POC Glucose (mg/dL)   175 H


 


Calcium  9.5 


 


C-Reactive Protein  31.70 H 

















Assessment:


[]Left calcaneal osteomyelitis- worsening


RIght weepy lymphedema  BL LE's mid calf down








Plan:


  Continue dressing changes prn saturation, minimally BID- leave silver 

alginate in place x 24 hours 


  Continue Vancomycin per ID recs 


  Encourage elevation of lower extremities 


  MRI- RLE- negative for osteo, continue silver, abx for cellulitis 


  Probable BKA Left LE, await Dr. Kuhn's final recommendations, NPOpMN Sunday

, pending clearance per hospitalist 





 Active Medications











Generic Name Dose Route Start Last Admin





  Trade Name Freq  PRN Reason Stop Dose Admin


 


Acetaminophen  650 mg  05/30/17 13:29  06/01/17 16:24





  Tylenol Tab*  PO   650 mg





  Q4H PRN   Administration





  FEVER/PAIN   


 


Amlodipine Besylate  5 mg  05/30/17 21:00  06/01/17 21:25





  Norvasc Tab*  PO   5 mg





  BEDTIME ERNST   Administration


 


Atenolol  50 mg  05/31/17 09:00  06/02/17 08:51





  Tenormin Tab*  PO   50 mg





  QAM ERNST   Administration


 


Atorvastatin Calcium  80 mg  05/31/17 09:00  06/02/17 09:06





  Lipitor*  PO   80 mg





  DAILY ERNST   Administration


 


Calcium Carbonate  1,000 mg  05/30/17 16:56  06/01/17 06:14





  Tums*  PO   1,000 mg





  Q4H PRN   Administration





  reflux   


 


Dextrose  12.5 gm  05/30/17 15:02  





  D50w Syringe 50 Ml*  IV PUSH   





  .FOR FS < 60 - SS PRN   





  FS < 60   


 


Furosemide  20 mg  06/01/17 15:00  06/02/17 09:58





  Lasix Iv*  IV SLOW PU   20 mg





  DAILY ERNST   Administration


 


Heparin Sodium (Porcine)  5,000 units  05/30/17 14:00  06/02/17 06:26





  Heparin Vial(*)  SUBCUT   5,000 units





  Q8HR ERNST   Administration


 


Heparin Sodium (Porcine)  1 ml  06/02/17 18:00  





  Heparin Flush Picc/Ml/Cvc(*)  FLUSH   





  0600,1800 [facility]   





  Protocol   


 


Vancomycin HCl 1,250 mg/  250 mls @ 166.667 mls/hr  05/31/17 00:00  06/02/17 09:

58





  Sodium Chloride  IVPB   166.667 mls/hr





  Q8H ERNST   Administration


 


Insulin Glargine  50 units  06/01/17 21:00  06/02/17 10:00





  Lantus(*)  SUBCUT   50 units





  BID ERNST   Administration


 


Insulin Human Lispro  15 units  06/01/17 16:37  06/02/17 08:53





  Humalog*  SUBCUT   15 units





  AC ERNST   Administration





  Protocol   


 


Lactobacillus Rhamnosus  1 cap  05/30/17 21:00  06/02/17 09:58





  Culturelle*  PO   1 cap





  BID ERNST   Administration


 


Lisinopril  30 mg  05/31/17 09:00  06/02/17 08:51





  Prinivil Tab*  PO   30 mg





  DAILY ERNST   Administration


 


Metformin HCl  1,000 mg  05/30/17 17:00  06/02/17 08:51





  Glucophage*  PO   1,000 mg





  BID WITH MEALS ERNST   Administration


 


Morphine Sulfate  4 mg  05/31/17 15:48  06/02/17 09:57





  Morphine Inj (Syringe)*  IV   4 mg





  Q2H PRN   Administration





  PAIN   


 


Nystatin  1 applic  06/01/17 12:00  06/02/17 09:58





  Nystatin Cream*  TOPICAL   1 applic





  BID ERNST   Administration


 


Omeprazole  20 mg  05/31/17 07:30  06/02/17 08:50





  Prilosec Cap*  PO   20 mg





  DAILY@0730 ERNST   Administration


 


Ondansetron HCl  4 mg  05/30/17 13:29  05/31/17 23:04





  Zofran Inj*  IV   4 mg





  Q4H PRN   Administration





  NAUSEA/VOMITING   


 


Oxycodone HCl  10 mg  05/30/17 14:46  06/01/17 06:14





  Roxycodone Tab*  PO   10 mg





  Q4H PRN   Administration





  PAIN   


 


Oxycodone HCl  20 mg  05/31/17 14:40  06/02/17 08:51





  Oxycontin(*)  PO   20 mg





  BID ERNST   Administration


 


Pharmacy Consult  1 note  06/01/17 11:50  





  Vancomycin Per Pharmacy*  FOLLOW UP   





  . PRN   





  PER PROTOCOL   


 


Pharmacy Profile Note  1 note  06/03/17 07:30  





  Vancomycin Trough Check  FOLLOW UP  06/03/17 07:31  





  0730 ONE

## 2017-06-02 NOTE — PN
Subjective


Date of Service: 06/02/17


Interval History: 





Patient reports that he is urinating quite a bit and he feels that his edema is 

improving.  He is having some trouble emotionally processing the idea of a BKA.

  No SOB, CP, abd pain, n/v.





Objective


Active Medications: 








Acetaminophen (Tylenol Tab*)  650 mg PO Q4H PRN


   PRN Reason: FEVER/PAIN


   Last Admin: 06/01/17 16:24 Dose:  650 mg


Amlodipine Besylate (Norvasc Tab*)  5 mg PO BEDTIME [facility]


   Last Admin: 06/01/17 21:25 Dose:  5 mg


Atenolol (Tenormin Tab*)  50 mg PO QAM [facility]


   Last Admin: 06/02/17 08:51 Dose:  50 mg


Atorvastatin Calcium (Lipitor*)  80 mg PO DAILY [facility]


   Last Admin: 06/02/17 09:06 Dose:  80 mg


Calcium Carbonate (Tums*)  1,000 mg PO Q4H PRN


   PRN Reason: reflux


   Last Admin: 06/01/17 06:14 Dose:  1,000 mg


Dextrose (D50w Syringe 50 Ml*)  12.5 gm IV PUSH .FOR FS < 60 - SS PRN


   PRN Reason: FS < 60


Furosemide (Lasix Iv*)  20 mg IV SLOW PU DAILY [facility]


   Last Admin: 06/02/17 09:58 Dose:  20 mg


Heparin Sodium (Porcine) (Heparin Vial(*))  5,000 units SUBCUT Q8HR [facility]


   Last Admin: 06/02/17 13:44 Dose:  5,000 units


Heparin Sodium (Porcine) (Heparin Flush Picc/Ml/Cvc(*))  1 ml FLUSH 0600,1800 

ERNST


   PRN Reason: Protocol


Vancomycin HCl 1,250 mg/ (Sodium Chloride)  250 mls @ 166.667 mls/hr IVPB Q8H 

[facility]


   Last Admin: 06/02/17 09:58 Dose:  166.667 mls/hr


Insulin Glargine (Lantus(*))  50 units SUBCUT BID [facility]


   Last Admin: 06/02/17 10:00 Dose:  50 units


Insulin Human Lispro (Humalog*)  15 units SUBCUT AC ERNST


   PRN Reason: Protocol


   Last Admin: 06/02/17 12:52 Dose:  15 units


Lactobacillus Rhamnosus (Culturelle*)  1 cap PO BID [facility]


   Last Admin: 06/02/17 09:58 Dose:  1 cap


Lisinopril (Prinivil Tab*)  30 mg PO DAILY [facility]


   Last Admin: 06/02/17 08:51 Dose:  30 mg


Metformin HCl (Glucophage*)  1,000 mg PO BID WITH MEALS [facility]


   Last Admin: 06/02/17 08:51 Dose:  1,000 mg


Morphine Sulfate (Morphine Inj (Syringe)*)  4 mg IV Q2H PRN


   PRN Reason: PAIN


   Last Admin: 06/02/17 12:31 Dose:  4 mg


Nystatin (Nystatin Cream*)  1 applic TOPICAL BID [facility]


   Last Admin: 06/02/17 09:58 Dose:  1 applic


Omeprazole (Prilosec Cap*)  20 mg PO DAILY@0730 [facility]


   Last Admin: 06/02/17 08:50 Dose:  20 mg


Ondansetron HCl (Zofran Inj*)  4 mg IV Q4H PRN


   PRN Reason: NAUSEA/VOMITING


   Last Admin: 05/31/17 23:04 Dose:  4 mg


Oxycodone HCl (Roxycodone Tab*)  10 mg PO Q4H PRN


   PRN Reason: PAIN


   Last Admin: 06/02/17 14:02 Dose:  10 mg


Oxycodone HCl (Oxycontin(*))  20 mg PO BID [facility]


   Last Admin: 06/02/17 08:51 Dose:  20 mg


Pharmacy Consult (Vancomycin Per Pharmacy*)  1 note FOLLOW UP . PRN


   PRN Reason: PER PROTOCOL


Pharmacy Profile Note (Vancomycin Trough Check)  1 note FOLLOW UP 0730 ONE


   Stop: 06/03/17 07:31











Vital Signs:











Temp Pulse Resp BP Pulse Ox


 


 97.5 F   84   16   133/87   93 


 


 06/02/17 08:33  06/02/17 08:33  06/02/17 14:02  06/02/17 08:33  06/02/17 08:33











Oxygen Devices in Use Now: None


Appearance: This is a well appearing middle aged male in NAD


Respiratory: Symmetrical Chest Expansion and Respiratory Effort, Clear to 

Auscultation


Cardiovascular: NL Sounds; No Murmurs; No JVD, RRR


Extremities: - - improving, non-pitting LE edema


Skin: - - ACE wraps to the distal knee bilaterally


Neurological: Alert and Oriented x 3


Result Diagrams: 


 06/02/17 06:42





 06/02/17 06:42


Microbiology and Other Data: 


 Microbiology











 05/31/17 07:54 Aerobic Blood Culture - Preliminary





 Blood Venous    No Growth Day 1





 Anaerobic Blood Culture - Preliminary





    No Growth Day 1


 


 05/30/17 23:34 Aerobic Blood Culture - Preliminary





 Blood Venous    No Growth Day 1





 Anaerobic Blood Culture - Preliminary





    No Growth Day 1











Diagnostic Imaging: 





Echo 5/8/17 - LVEF 55-60%, mod LVH, diastolic dysfunction, mild to mod reduced 

RV function, unable to assess RV function





MRI L ankle - osteomyelitis of the L calcaneus that looks to have significantly 

advanced when compared to 3/2017





Assess/Plan/Problems-Billing


Assessment: 





This is a 60 yo gentleman with known osteomyelitis of L calcaneus, poorly 

controlled diabetes, chronic RV failure and diastolic dysfunction with 

significant LE edema as well as untreated DAVID, HTN, COPD, morbid obesity and 

chronic pain who was referred to the hospital by Dr Kuhn 





- Patient Problems


(1) Cellulitis


Comment: 


With chronic osteomyelitis and non-healing DM ulcer


Recently treated with 6-8 weeks of Vanco and oral doxycycline


Vanco resumed per ID recommendations


   





(2) Osteomyelitis


Comment: 


Chronic L calcaneal osteomyelitis


Repeat MRI completed today which shows rather significant progression of the 

osteomyelitis since March


Plan for BKA by Dr Kuhn Monday


MRI of the R foot is pending   





(3) Chronic heart failure


Comment: 


Review of echo from earlier this month shows RV failure and diastolic 

dysfunction which appears to be chronic in nature.  RV pressure unable to be 

measured, but assumed he has pulm HTN from untreated DAVID


Attempted CPAP, but still unable to tolerate


Edema improving with IV Lasix   





(4) Lymphedema


Comment: 


Chronic


Likely related to RV and diastolic failure


Cont compression and diuresis   





(5) DAVID (obstructive sleep apnea)


Comment: 


Non-compliant with CPAP at home


He reports that the machine woke him up so he stopped using it


Attempted CPAP again last night, unable to tolerate   





(6) COPD (chronic obstructive pulmonary disease)


Comment: 


No acute exacerbation   





(7) Diabetes


Comment: 


IDDM 


HgbA1c 9.7%


Increased Lantus, will also cont to increase mealtime Humalog coverage   





(8) Chronic pain


Comment: 


Chronic back, knee, shoulder, and leg pain


Prescribed Suboxone by Dr Wilder


Spoke with Dr Wilder at admission


He had incomplete pain relief from Suboxone, which has been stopped


Incomplete relief from prn oxycodone


Start oxycontin with cont prn oxycodone


Referral to pain management at discharge   





(9) Hyperlipidemia


Comment: 


Cont statin   





(10) Morbid obesity


Comment: 


BMI 50


Bariatric surgery within the last 12 months with 65# weight loss   





(11) HTN (hypertension)


Comment: 


Normotensive


Cont home antihypertensives   





(12) Full code status








(13) DVT prophylaxis


Comment: 


HSQ   


Status and Disposition: 





Inpatient.  REGULO planned for Monday

## 2017-06-03 LAB — BUN SERPL-MCNC: 14 MG/DL (ref 6–24)

## 2017-06-03 RX ADMIN — MORPHINE SULFATE PRN MG: 4 INJECTION, SOLUTION INTRAMUSCULAR; INTRAVENOUS at 01:17

## 2017-06-03 RX ADMIN — Medication SCH ML: at 05:30

## 2017-06-03 RX ADMIN — OXYCODONE HYDROCHLORIDE PRN MG: 5 CAPSULE ORAL at 00:34

## 2017-06-03 RX ADMIN — CALCIUM CARBONATE (ANTACID) CHEW TAB 500 MG PRN MG: 500 CHEW TAB at 21:47

## 2017-06-03 RX ADMIN — DOCOSANOL SCH APPLIC: 100 CREAM TOPICAL at 21:52

## 2017-06-03 RX ADMIN — ONDANSETRON PRN MG: 2 INJECTION INTRAMUSCULAR; INTRAVENOUS at 18:36

## 2017-06-03 RX ADMIN — MORPHINE SULFATE PRN MG: 4 INJECTION, SOLUTION INTRAMUSCULAR; INTRAVENOUS at 07:59

## 2017-06-03 RX ADMIN — Medication SCH CAP: at 08:08

## 2017-06-03 RX ADMIN — ATORVASTATIN CALCIUM SCH MG: 80 TABLET, FILM COATED ORAL at 08:08

## 2017-06-03 RX ADMIN — CALCIUM CARBONATE (ANTACID) CHEW TAB 500 MG PRN MG: 500 CHEW TAB at 11:30

## 2017-06-03 RX ADMIN — INSULIN GLARGINE SCH UNITS: 100 INJECTION, SOLUTION SUBCUTANEOUS at 08:09

## 2017-06-03 RX ADMIN — INSULIN GLARGINE SCH UNIT: 100 INJECTION, SOLUTION SUBCUTANEOUS at 21:50

## 2017-06-03 RX ADMIN — MORPHINE SULFATE PRN MG: 4 INJECTION, SOLUTION INTRAMUSCULAR; INTRAVENOUS at 16:42

## 2017-06-03 RX ADMIN — MORPHINE SULFATE PRN MG: 4 INJECTION, SOLUTION INTRAMUSCULAR; INTRAVENOUS at 05:22

## 2017-06-03 RX ADMIN — INSULIN LISPRO SCH UNITS: 100 INJECTION, SOLUTION INTRAVENOUS; SUBCUTANEOUS at 16:39

## 2017-06-03 RX ADMIN — Medication SCH CAP: at 21:48

## 2017-06-03 RX ADMIN — NYSTATIN SCH APPLIC: 100000 CREAM TOPICAL at 21:52

## 2017-06-03 RX ADMIN — VANCOMYCIN HYDROCHLORIDE SCH MLS/HR: 1 INJECTION, POWDER, LYOPHILIZED, FOR SOLUTION INTRAVENOUS at 01:16

## 2017-06-03 RX ADMIN — CETIRIZINE HYDROCHLORIDE SCH MG: 10 TABLET, FILM COATED ORAL at 18:36

## 2017-06-03 RX ADMIN — VANCOMYCIN HYDROCHLORIDE SCH MLS/HR: 1 INJECTION, POWDER, LYOPHILIZED, FOR SOLUTION INTRAVENOUS at 11:31

## 2017-06-03 RX ADMIN — HEPARIN SODIUM SCH UNITS: 5000 INJECTION INTRAVENOUS; SUBCUTANEOUS at 14:00

## 2017-06-03 RX ADMIN — VANCOMYCIN HYDROCHLORIDE SCH MLS/HR: 1 INJECTION, POWDER, LYOPHILIZED, FOR SOLUTION INTRAVENOUS at 18:31

## 2017-06-03 RX ADMIN — MORPHINE SULFATE PRN MG: 4 INJECTION, SOLUTION INTRAMUSCULAR; INTRAVENOUS at 14:00

## 2017-06-03 RX ADMIN — AMLODIPINE BESYLATE SCH MG: 5 TABLET ORAL at 21:48

## 2017-06-03 RX ADMIN — OXYCODONE HYDROCHLORIDE SCH MG: 20 TABLET, FILM COATED, EXTENDED RELEASE ORAL at 21:49

## 2017-06-03 RX ADMIN — METFORMIN HYDROCHLORIDE SCH MG: 1000 TABLET, FILM COATED ORAL at 08:08

## 2017-06-03 RX ADMIN — OXYCODONE HYDROCHLORIDE PRN MG: 5 CAPSULE ORAL at 21:49

## 2017-06-03 RX ADMIN — DOCOSANOL SCH APPLIC: 100 CREAM TOPICAL at 19:43

## 2017-06-03 RX ADMIN — INSULIN LISPRO SCH UNITS: 100 INJECTION, SOLUTION INTRAVENOUS; SUBCUTANEOUS at 08:09

## 2017-06-03 RX ADMIN — FUROSEMIDE SCH MG: 10 INJECTION, SOLUTION INTRAMUSCULAR; INTRAVENOUS at 08:01

## 2017-06-03 RX ADMIN — MORPHINE SULFATE PRN MG: 4 INJECTION, SOLUTION INTRAMUSCULAR; INTRAVENOUS at 03:10

## 2017-06-03 RX ADMIN — Medication SCH ML: at 17:30

## 2017-06-03 RX ADMIN — MORPHINE SULFATE PRN MG: 4 INJECTION, SOLUTION INTRAMUSCULAR; INTRAVENOUS at 19:42

## 2017-06-03 RX ADMIN — OMEPRAZOLE SCH MG: 20 CAPSULE, DELAYED RELEASE ORAL at 08:08

## 2017-06-03 RX ADMIN — LISINOPRIL SCH MG: 10 TABLET ORAL at 08:08

## 2017-06-03 RX ADMIN — NYSTATIN SCH APPLIC: 100000 CREAM TOPICAL at 08:11

## 2017-06-03 RX ADMIN — MORPHINE SULFATE PRN MG: 4 INJECTION, SOLUTION INTRAMUSCULAR; INTRAVENOUS at 11:31

## 2017-06-03 RX ADMIN — OXYCODONE HYDROCHLORIDE PRN MG: 5 CAPSULE ORAL at 16:41

## 2017-06-03 RX ADMIN — OXYCODONE HYDROCHLORIDE SCH MG: 20 TABLET, FILM COATED, EXTENDED RELEASE ORAL at 08:08

## 2017-06-03 RX ADMIN — METFORMIN HYDROCHLORIDE SCH MG: 1000 TABLET, FILM COATED ORAL at 16:41

## 2017-06-03 RX ADMIN — HEPARIN SODIUM SCH UNITS: 5000 INJECTION INTRAVENOUS; SUBCUTANEOUS at 21:52

## 2017-06-03 RX ADMIN — INSULIN LISPRO SCH UNITS: 100 INJECTION, SOLUTION INTRAVENOUS; SUBCUTANEOUS at 11:53

## 2017-06-03 RX ADMIN — ATENOLOL SCH MG: 50 TABLET ORAL at 08:08

## 2017-06-03 RX ADMIN — OXYCODONE HYDROCHLORIDE PRN MG: 5 CAPSULE ORAL at 05:21

## 2017-06-03 RX ADMIN — HEPARIN SODIUM SCH UNITS: 5000 INJECTION INTRAVENOUS; SUBCUTANEOUS at 05:22

## 2017-06-03 NOTE — PN
Progress Note





- Progress Note


SOAP: 


Subjective:


Pt states that he is having discomfort in his left lower leg.  The pain 

medications help reduce the pain.  He denies f/c, CP or SOB.  VSS overnight











Objective:


PE: General- 62 y/o M in NAD sitting comfortably in chair


Bilateral lower extremities- dressing intact, able to flex and extend toes, 

edema with chronic skin changes of the LE, + 2 DP pulse, sensation intact to 

light touch distally





 Vital Signs











Temp Pulse Resp BP Pulse Ox


 


 98.5 F   76   14   119/62   100 


 


 06/02/17 19:50  06/02/17 19:50  06/03/17 08:08  06/02/17 19:50  06/02/17 19:50








 Laboratory Results - last 24 hr











  06/02/17 06/02/17 06/03/17





  12:40 17:04 07:54


 


BUN   


 


Creatinine   


 


Est GFR ( Amer)   


 


Est GFR (Non-Af Amer)   


 


POC Glucose (mg/dL)  294 H  229 H  218 H


 


Vancomycin Trough   














  06/03/17





  08:20


 


BUN  14


 


Creatinine  0.76


 


Est GFR ( Amer)  135.0


 


Est GFR (Non-Af Amer)  105.0


 


POC Glucose (mg/dL) 


 


Vancomycin Trough  16.9




















Assessment:


Left calcaneal osteomyelitis- worsening


Right weepy lymphedema  BL LE's mid calf down








Plan:


  Continue dressing changes prn saturation, minimally BID- leave silver 

alginate in place x 24 hours 


  Continue Vanco per ID recommendation


  Encourage elevation of lower extremities 


  MRI- RLE- negative for osteo, continue silver, abx for cellulitis 


  Dressing change by provider tomorrow am.


  Probable BKA Left LE, await Dr. Kuhn's final recommendations, NPO after 

midnight Sunday, pending clearance per hospitalist

## 2017-06-03 NOTE — PN
Subjective


Date of Service: 06/03/17


Interval History: 





Patient seen and examined at bedside. Patient states that he is feeling well 

today, but reports nasal congestion. Denies fever, chills, shortness of breath, 

chest discomfort, N/V/D. Pt states that he is urinating a large amount and 

feels that his edema is overall improving.  





Pt reports a blister that appeared yesterday on his right lower lip.








Family History: Unchanged from Admission


Social History: Unchanged from Admission


Past Medical History: Unchanged from Admission





Objective


Active Medications: 





Acetaminophen (Tylenol Tab*)  650 mg PO Q4H PRN Reason: FEVER/PAIN


Amlodipine Besylate (Norvasc Tab*)  5 mg PO BEDTIME ERNST


Atenolol (Tenormin Tab*)  50 mg PO QAM ERNST


Atorvastatin Calcium (Lipitor*)  80 mg PO DAILY ERNST


Calcium Carbonate (Tums*)  1,000 mg PO Q4H PRN Reason: reflux


Dextrose (D50w Syringe 50 Ml*)  12.5 gm IV PUSH .FOR FS < 60 - SS PRN Reason: 

FS < 60


Furosemide (Lasix Iv*)  20 mg IV SLOW PU DAILY [facility]


Heparin Sodium (Porcine) (Heparin Vial(*))  5,000 units SUBCUT Q8HR [facility]


Heparin Sodium (Porcine) (Heparin Flush Picc/Ml/Cvc(*))  1 ml FLUSH 0600,1800 

[facility] Reason: Protocol


Vancomycin HCl 1,250 mg/ (Sodium Chloride)  250 mls @ 166.667 mls/hr IVPB Q8H 

[facility]


Insulin Glargine (Lantus(*))  50 units SUBCUT BID [facility]


Insulin Human Lispro (Humalog*)  20 units SUBCUT AC [facility] Reason: Protocol


Lactobacillus Rhamnosus (Culturelle*)  1 cap PO BID ERNST


Lisinopril (Prinivil Tab*)  30 mg PO DAILY [facility]


Metformin HCl (Glucophage*)  1,000 mg PO BID WITH MEALS ERNST


Morphine Sulfate (Morphine Inj (Syringe)*)  4 mg IV Q2H PRN Reason: PAIN


Nystatin (Nystatin Cream*)  1 applic TOPICAL BID ERNST


Omeprazole (Prilosec Cap*)  20 mg PO DAILY@0730 [facility]


Ondansetron HCl (Zofran Inj*)  4 mg IV Q4H PRN Reason: NAUSEA/VOMITING


Oxycodone HCl (Roxycodone Tab*)  10 mg PO Q4H PRN Reason: PAIN


Oxycodone HCl (Oxycontin(*))  20 mg PO BID [facility]


Pharmacy Consult (Vancomycin Per Pharmacy*)  1 note FOLLOW UP . PRN





 Vital Signs











  06/02/17 06/02/17 06/02/17





  18:10 19:10 19:50


 


Temperature   98.5 F


 


Pulse Rate   76


 


Respiratory 20 12 20





Rate   


 


Blood Pressure   119/62





(mmHg)   


 


O2 Sat by Pulse   100





Oximetry   
































  06/03/17 06/03/17 06/03/17





  07:59 08:08 08:31


 


Temperature   98.2 F


 


Pulse Rate   78


 


Respiratory 14 14 18





Rate   


 


Blood Pressure   126/69





(mmHg)   


 


O2 Sat by Pulse   100





Oximetry   

















  06/03/17 06/03/17 06/03/17





  12:31 14:00 15:54


 


Temperature   98.1 F


 


Pulse Rate   78


 


Respiratory 16 16 16





Rate   


 


Blood Pressure   131/70





(mmHg)   


 


O2 Sat by Pulse   98





Oximetry   











Oxygen Devices in Use Now: None


Appearance: NAD, sitting up in a chair


Eyes: No Scleral Icterus


Respiratory: Symmetrical Chest Expansion and Respiratory Effort, Clear to 

Auscultation


Cardiovascular: NL Sounds; No Murmurs; No JVD, RRR, - - Bilateral LE non-

pitting edema


Abdominal: NL Sounds; No Tenderness; No Distention


Extremities: No Edema


Skin: - - ACE wraps to bilateral LEs, there is some mild blancable redness to 

right LE from the knee down. Blister noted to right side of lower lip.


Neurological: Alert and Oriented x 3, NL Muscle Strength and Tone


Lines/Tubes/Other Access: Clean, Dry and Intact Peripheral IV - site benign


Nutrition: Taking PO's


Result Diagrams: 


 06/02/17 06:42





 06/03/17 08:20


Microbiology and Other Data: 


 Microbiology











 05/31/17 07:54 Aerobic Blood Culture - Preliminary





 Blood Venous    No Growth Day 1





 Anaerobic Blood Culture - Preliminary





    No Growth Day 1


 


 05/30/17 23:34 Aerobic Blood Culture - Preliminary





 Blood Venous    No Growth Day 1





 Anaerobic Blood Culture - Preliminary





    No Growth Day 1











Diagnostic Imaging: 





Echo 5/8/17 - LVEF 55-60%, mod LVH, diastolic dysfunction, mild to mod reduced 

RV function, unable to assess RV function





MRI L ankle - osteomyelitis of the L calcaneus that looks to have significantly 

advanced when compared to 3/2017











Assess/Plan/Problems-Billing


Assessment: 





Mr. Katz is a 60 yo gentleman with known osteomyelitis of L calcaneus, poorly 

controlled diabetes, chronic RV failure and diastolic dysfunction with 

significant LE edema as well as untreated DAVID, HTN, COPD, morbid obesity and 

chronic pain who was referred to the hospital by Dr Kuhn.





 





- Patient Problems


(1) Cellulitis


Code(s): L03.90 - CELLULITIS, UNSPECIFIED   SNOMED Code(s): 969308146


   Comment: 


- With chronic osteomyelitis and non-healing DM ulcer


- Recently treated with 6-8 weeks of Vanco and oral doxycycline


- Continue Vanco per ID recommendations


- Plan for left BKA on Monday


   





(2) Osteomyelitis


Code(s): M86.9 - OSTEOMYELITIS, UNSPECIFIED   SNOMED Code(s): 33398861


   Comment: 


- Chronic L calcaneal osteomyelitis


- Repeat MRI shows rather significant progression of the osteomyelitis since 

March


- Plan for left BKA by Dr Kuhn Monday


- MRI of the R foot is pending   





(3) Chronic heart failure


Code(s): I50.9 - HEART FAILURE, UNSPECIFIED   SNOMED Code(s): 42087262


   Comment: 


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 

appears to be chronic in nature.  RV pressure unable to be measured, but 

assumed he has pulm HTN from untreated DAVID


- Attempted CPAP, but still unable to tolerate


- Edema improving with IV Lasix   





(4) Lymphedema


Code(s): I89.0 - LYMPHEDEMA, NOT ELSEWHERE CLASSIFIED   SNOMED Code(s): 

449738966


   Comment: 


- Chronic


- Likely related to RV and diastolic failure


- Continue compression and diuresis   





(5) DAVID (obstructive sleep apnea)


Code(s): G47.33 - OBSTRUCTIVE SLEEP APNEA (ADULT) (PEDIATRIC)   SNOMED Code(s): 

38726830


   Comment: 


- Non-compliant with CPAP at home


- He reports that the machine woke him up so he stopped using it


- Attempted CPAP again last night, unable to tolerate   





(6) COPD (chronic obstructive pulmonary disease)


Code(s): J44.9 - CHRONIC OBSTRUCTIVE PULMONARY DISEASE, UNSPECIFIED   SNOMED 

Code(s): 03221408


   Comment: 


- No acute exacerbation   





(7) Diabetes


Code(s): E11.9 - TYPE 2 DIABETES MELLITUS WITHOUT COMPLICATIONS   SNOMED Code(s)

: 67295403


   Comment: 


- IDDM 


- HgbA1c 9.7%


- Continue mealtime Humalog coverage


- Will increase Lantus


   





(8) Hyperlipidemia


Code(s): E78.5 - HYPERLIPIDEMIA, UNSPECIFIED   SNOMED Code(s): 86747601


   Comment: 


- Continue statin   





(9) Chronic pain


Code(s): G89.29 - OTHER CHRONIC PAIN   SNOMED Code(s): 85615620


   Comment: 


- Chronic back, knee, shoulder, and leg pain


- Prescribed Suboxone by Dr Wilder, He had incomplete pain relief from Suboxone

, which has been stopped


- Incomplete relief from prn oxycodone


- Start oxycontin with continue prn oxycodone


- Referral to pain management at discharge   





(10) HTN (hypertension)


Code(s): I10 - ESSENTIAL (PRIMARY) HYPERTENSION   SNOMED Code(s): 73674899


   Comment: 


- Normotensive


- Continue home antihypertensives   





(11) DVT prophylaxis


Code(s): [id number] -    SNOMED Code(s): 069640918


   Comment: 


- HSQ   





(12) Full code status


Code(s): Z78.9 - OTHER SPECIFIED HEALTH STATUS   SNOMED Code(s): 165093692


   


Status and Disposition: 





Inpatient.  BKA planned for Monday.

## 2017-06-04 LAB
HCT VFR BLD AUTO: 36 % (ref 42–52)
HGB BLD-MCNC: 11.4 G/DL (ref 14–18)
MCH RBC QN AUTO: 25 PG (ref 27–31)
MCHC RBC AUTO-ENTMCNC: 32 G/DL (ref 31–36)
MCV RBC AUTO: 78 FL (ref 80–94)
RBC # BLD AUTO: 4.58 10^6/UL (ref 4–5.4)
WBC # BLD AUTO: 12.9 10^3/UL (ref 3.5–10.8)

## 2017-06-04 RX ADMIN — HEPARIN SODIUM SCH UNITS: 5000 INJECTION INTRAVENOUS; SUBCUTANEOUS at 12:31

## 2017-06-04 RX ADMIN — INSULIN LISPRO SCH UNITS: 100 INJECTION, SOLUTION INTRAVENOUS; SUBCUTANEOUS at 07:51

## 2017-06-04 RX ADMIN — VANCOMYCIN HYDROCHLORIDE SCH MLS/HR: 1 INJECTION, POWDER, LYOPHILIZED, FOR SOLUTION INTRAVENOUS at 07:49

## 2017-06-04 RX ADMIN — Medication SCH: at 19:32

## 2017-06-04 RX ADMIN — HEPARIN SODIUM SCH UNITS: 5000 INJECTION INTRAVENOUS; SUBCUTANEOUS at 23:37

## 2017-06-04 RX ADMIN — CALCIUM CARBONATE (ANTACID) CHEW TAB 500 MG PRN MG: 500 CHEW TAB at 00:43

## 2017-06-04 RX ADMIN — VANCOMYCIN HYDROCHLORIDE SCH MLS/HR: 1 INJECTION, POWDER, LYOPHILIZED, FOR SOLUTION INTRAVENOUS at 00:37

## 2017-06-04 RX ADMIN — OXYCODONE HYDROCHLORIDE PRN MG: 5 CAPSULE ORAL at 02:47

## 2017-06-04 RX ADMIN — ATORVASTATIN CALCIUM SCH MG: 80 TABLET, FILM COATED ORAL at 07:49

## 2017-06-04 RX ADMIN — INSULIN LISPRO SCH UNITS: 100 INJECTION, SOLUTION INTRAVENOUS; SUBCUTANEOUS at 12:32

## 2017-06-04 RX ADMIN — INSULIN GLARGINE SCH UNIT: 100 INJECTION, SOLUTION SUBCUTANEOUS at 20:54

## 2017-06-04 RX ADMIN — METFORMIN HYDROCHLORIDE SCH MG: 1000 TABLET, FILM COATED ORAL at 07:50

## 2017-06-04 RX ADMIN — DOCOSANOL SCH APPLIC: 100 CREAM TOPICAL at 20:55

## 2017-06-04 RX ADMIN — MORPHINE SULFATE PRN MG: 4 INJECTION, SOLUTION INTRAMUSCULAR; INTRAVENOUS at 21:02

## 2017-06-04 RX ADMIN — Medication SCH ML: at 05:16

## 2017-06-04 RX ADMIN — MORPHINE SULFATE PRN MG: 4 INJECTION, SOLUTION INTRAMUSCULAR; INTRAVENOUS at 12:31

## 2017-06-04 RX ADMIN — MORPHINE SULFATE PRN MG: 4 INJECTION, SOLUTION INTRAMUSCULAR; INTRAVENOUS at 05:14

## 2017-06-04 RX ADMIN — OXYCODONE HYDROCHLORIDE SCH MG: 20 TABLET, FILM COATED, EXTENDED RELEASE ORAL at 20:55

## 2017-06-04 RX ADMIN — NYSTATIN SCH APPLIC: 100000 CREAM TOPICAL at 23:37

## 2017-06-04 RX ADMIN — Medication SCH CAP: at 07:49

## 2017-06-04 RX ADMIN — MORPHINE SULFATE PRN MG: 4 INJECTION, SOLUTION INTRAMUSCULAR; INTRAVENOUS at 00:36

## 2017-06-04 RX ADMIN — INSULIN GLARGINE SCH UNIT: 100 INJECTION, SOLUTION SUBCUTANEOUS at 07:52

## 2017-06-04 RX ADMIN — NYSTATIN SCH APPLIC: 100000 CREAM TOPICAL at 07:54

## 2017-06-04 RX ADMIN — Medication SCH CAP: at 20:55

## 2017-06-04 RX ADMIN — OMEPRAZOLE SCH MG: 20 CAPSULE, DELAYED RELEASE ORAL at 07:50

## 2017-06-04 RX ADMIN — CETIRIZINE HYDROCHLORIDE SCH MG: 10 TABLET, FILM COATED ORAL at 07:51

## 2017-06-04 RX ADMIN — FUROSEMIDE SCH MG: 10 INJECTION, SOLUTION INTRAMUSCULAR; INTRAVENOUS at 07:49

## 2017-06-04 RX ADMIN — LISINOPRIL SCH MG: 10 TABLET ORAL at 07:50

## 2017-06-04 RX ADMIN — ATENOLOL SCH MG: 50 TABLET ORAL at 07:50

## 2017-06-04 RX ADMIN — METFORMIN HYDROCHLORIDE SCH MG: 1000 TABLET, FILM COATED ORAL at 17:27

## 2017-06-04 RX ADMIN — MORPHINE SULFATE PRN MG: 4 INJECTION, SOLUTION INTRAMUSCULAR; INTRAVENOUS at 07:56

## 2017-06-04 RX ADMIN — DOCOSANOL SCH APPLIC: 100 CREAM TOPICAL at 17:30

## 2017-06-04 RX ADMIN — DOCOSANOL SCH APPLIC: 100 CREAM TOPICAL at 09:05

## 2017-06-04 RX ADMIN — DOCOSANOL SCH: 100 CREAM TOPICAL at 17:06

## 2017-06-04 RX ADMIN — VANCOMYCIN HYDROCHLORIDE SCH MLS/HR: 1 INJECTION, POWDER, LYOPHILIZED, FOR SOLUTION INTRAVENOUS at 17:28

## 2017-06-04 RX ADMIN — OXYCODONE HYDROCHLORIDE SCH MG: 20 TABLET, FILM COATED, EXTENDED RELEASE ORAL at 07:51

## 2017-06-04 RX ADMIN — CALCIUM CARBONATE (ANTACID) CHEW TAB 500 MG PRN MG: 500 CHEW TAB at 05:16

## 2017-06-04 RX ADMIN — DOCOSANOL SCH APPLIC: 100 CREAM TOPICAL at 05:19

## 2017-06-04 RX ADMIN — INSULIN LISPRO SCH UNITS: 100 INJECTION, SOLUTION INTRAVENOUS; SUBCUTANEOUS at 17:28

## 2017-06-04 RX ADMIN — ONDANSETRON PRN MG: 2 INJECTION INTRAMUSCULAR; INTRAVENOUS at 02:01

## 2017-06-04 RX ADMIN — AMLODIPINE BESYLATE SCH MG: 5 TABLET ORAL at 20:54

## 2017-06-04 RX ADMIN — HEPARIN SODIUM SCH UNITS: 5000 INJECTION INTRAVENOUS; SUBCUTANEOUS at 05:18

## 2017-06-04 RX ADMIN — MORPHINE SULFATE PRN MG: 4 INJECTION, SOLUTION INTRAMUSCULAR; INTRAVENOUS at 17:28

## 2017-06-04 RX ADMIN — VANCOMYCIN HYDROCHLORIDE SCH MLS/HR: 1 INJECTION, POWDER, LYOPHILIZED, FOR SOLUTION INTRAVENOUS at 23:37

## 2017-06-04 NOTE — PN
Progress Note





- Progress Note


SOAP: 


Subjective:


Pt is doing well.  He continues to have pain in his left lower leg.  He denies 

CP, SOB, or F/C.  I spoke to his wife on the phone who [facility] 

[place] and would like to have pt go there after surgery.








Objective:


60 y/o overweight male in NAD, sitting comfortably in chair, A&Ox3


LLE- skin flaking, weeping discharge, deep ulcer on the heel, lower extremity 

swelling, calf nontender, able PF/DF ankle, +2 DP pulse sensation intact


RLE- Skin flaking, weeping discharge, lower leg edema, closed blister on 

posterior aspect of heel, calf nontender, + DF/PF ankle, +2 DP pulse, sensation 

intact





 Vital Signs











Temp Pulse Resp BP Pulse Ox


 


 98.5 F   72   18   130/73   91 


 


 06/04/17 16:08  06/04/17 16:08  06/04/17 17:28  06/04/17 16:08  06/04/17 16:08








 Laboratory Results - last 24 hr











  06/04/17 06/04/17 06/04/17





  07:17 08:12 11:38


 


WBC   12.9 H 


 


RBC   4.58 


 


Hgb   11.4 L 


 


Hct   36 L 


 


MCV   78 L 


 


MCH   25 L 


 


MCHC   32 


 


RDW   17 H 


 


Plt Count   315 


 


MPV   8 


 


Neut % (Auto)   79.3 


 


Lymph % (Auto)   7.6 L 


 


Mono % (Auto)   9.1 H 


 


Eos % (Auto)   2.4 


 


Baso % (Auto)   1.6 


 


Absolute Neuts (auto)   10.2 H 


 


Absolute Lymphs (auto)   1.0 


 


Absolute Monos (auto)   1.2 H 


 


Absolute Eos (auto)   0.3 


 


Absolute Basos (auto)   0.2 


 


Absolute Nucleated RBC   0 


 


Nucleated RBC %   0 


 


POC Glucose (mg/dL)  258 H   251 H














  06/04/17





  16:28


 


WBC 


 


RBC 


 


Hgb 


 


Hct 


 


MCV 


 


MCH 


 


MCHC 


 


RDW 


 


Plt Count 


 


MPV 


 


Neut % (Auto) 


 


Lymph % (Auto) 


 


Mono % (Auto) 


 


Eos % (Auto) 


 


Baso % (Auto) 


 


Absolute Neuts (auto) 


 


Absolute Lymphs (auto) 


 


Absolute Monos (auto) 


 


Absolute Eos (auto) 


 


Absolute Basos (auto) 


 


Absolute Nucleated RBC 


 


Nucleated RBC % 


 


POC Glucose (mg/dL)  297 H














Assessment:


Left calcaneal osteomyelitis- worsening


Right weeping lymphedema  BL LE's mid calf down








Plan:


  Continue dressing changes prn saturation, minimally BID- leave silver 

alginate in place x 24 hours 


  Continue Vanco per ID recommendation


  Encourage elevation of lower extremities 


  MRI- RLE- negative for osteo, continue silver, abx for cellulitis 


  Plan for BKA Left LE Monday, await Dr. Kuhn's final recommendations, NPO 

after midnight Sunday, spoke to hospitalist who plans to clear him for surgery





Dispo: after rrecovery from surgery [facility]

## 2017-06-04 NOTE — PN
Subjective


Date of Service: 06/04/17


Interval History: 





Patient seen and examined at bedside. Pt states that he is feeling well today 

with the exception of some breakthrough heat burn and nasal congestion. Denies 

fever, chills, shortness of breath, chest discomfort, N/V/D. Pt continues to 

report frequent urination and feels that overall his edema in the LEs is 

improving. 








Family History: Unchanged from Admission


Social History: Unchanged from Admission


Past Medical History: Unchanged from Admission





Objective


Active Medications: 





Acetaminophen (Tylenol Tab*)  650 mg PO Q4H PRN Reason: FEVER/PAIN


Amlodipine Besylate (Norvasc Tab*)  5 mg PO BEDTIME ERNST


Atenolol (Tenormin Tab*)  50 mg PO QAM ERNST


Atorvastatin Calcium (Lipitor*)  80 mg PO DAILY ERNST


Calcium Carbonate (Tums*)  1,000 mg PO Q4H PRN Reason: reflux


Cetirizine HCl (Zyrtec*)  10 mg PO DAILY [facility] Reason: Protocol


Dextrose (D50w Syringe 50 Ml*)  12.5 gm IV PUSH .FOR FS < 60 - SS PRN Reason: 

FS < 60


Docosanol (Abreva 10%*)  1 applic TOPICAL FIVE TIMES DAILY ERN[facility]


Furosemide (Lasix Iv*)  20 mg IV SLOW PU DAILY [facility]


Heparin Sodium (Porcine) (Heparin Vial(*))  5,000 units SUBCUT Q8HR [facility]


Heparin Sodium (Porcine) (Heparin Flush Picc/Ml/Cvc(*))  1 ml FLUSH 0600,1800 

[facility] Reason: Protocol


Vancomycin HCl 1,250 mg/ (Sodium Chloride)  250 mls @ 166.667 mls/hr IVPB Q8H 

[facility]


Insulin Glargine (Lantus(*))  55 units SUBCUT BID [facility]


Insulin Human Lispro (Humalog*)  20 units SUBCUT AC [facility] Reason: Protocol


Lactobacillus Rhamnosus (Culturelle*)  1 cap PO BID ERN[facility]


Lisinopril (Prinivil Tab*)  30 mg PO DAILY [facility]


Metformin HCl (Glucophage*)  1,000 mg PO BID WITH MEALS ERNST


Morphine Sulfate (Morphine Inj (Syringe)*)  4 mg IV Q2H PRN Reason: PAIN


Nystatin (Nystatin Cream*)  1 applic TOPICAL BID ERN[facility]


Omeprazole (Prilosec Cap*)  20 mg PO DAILY@0730 [facility]


Ondansetron HCl (Zofran Inj*)  4 mg IV Q4H PRN Reason: NAUSEA/VOMITING


Oxycodone HCl (Roxycodone Tab*)  10 mg PO Q4H PRN Reason: PAIN


Oxycodone HCl (Oxycontin(*))  20 mg PO BID [facility]


Pharmacy Consult (Vancomycin Per Pharmacy*)  1 note FOLLOW UP . PRN Reason: PER 

PROTOCOL





 Vital Signs














  06/03/17 06/03/17 06/04/17





  21:49 23:49 00:07


 


Temperature   99.2 F


 


Pulse Rate   76


 


Respiratory 18 18 16





Rate   


 


Blood Pressure   103/55





(mmHg)   


 


O2 Sat by Pulse   95





Oximetry   

















  06/04/17 06/04/17 06/04/17





  04:47 05:01 05:14


 


Temperature  98.4 F 


 


Pulse Rate  92 


 


Respiratory 18 16 18





Rate   


 


Blood Pressure  137/80 





(mmHg)   


 


O2 Sat by Pulse  89 





Oximetry   














  06/04/17 06/04/17 06/04/17





  06:14 07:45 07:51


 


Temperature  98.6 F 


 


Pulse Rate  75 


 


Respiratory 18 16 20





Rate   


 


Blood Pressure  103/71 





(mmHg)   


 


O2 Sat by Pulse  90 





Oximetry   




















  06/04/17 06/04/17





  16:08 17:28


 


Temperature 98.5 F 


 


Pulse Rate 72 


 


Respiratory 17 18





Rate  


 


Blood Pressure 130/73 





(mmHg)  


 


O2 Sat by Pulse 91 





Oximetry  











Oxygen Devices in Use Now: None


Appearance: NAD, sitting up in a chair


Eyes: No Scleral Icterus


Respiratory: Symmetrical Chest Expansion and Respiratory Effort, Clear to 

Auscultation - , diminished


Cardiovascular: NL Sounds; No Murmurs; No JVD, RRR


Abdominal: NL Sounds; No Tenderness; No Distention


Extremities: - - Bilateral LE edema


Skin: - - Dressings to bilateral LE with drainage


Neurological: Alert and Oriented x 3, NL Muscle Strength and Tone


Nutrition: Taking PO's


Result Diagrams: 


 06/04/17 08:12





 06/03/17 08:20


Microbiology and Other Data: 


 Microbiology











 05/31/17 07:54 Aerobic Blood Culture - Preliminary





 Blood Venous    No Growth Day 1





 Anaerobic Blood Culture - Preliminary





    No Growth Day 1


 


 05/30/17 23:34 Aerobic Blood Culture - Preliminary





 Blood Venous    No Growth Day 1





 Anaerobic Blood Culture - Preliminary





    No Growth Day 1











Diagnostic Imaging: 





Echo 5/8/17 - LVEF 55-60%, mod LVH, diastolic dysfunction, mild to mod reduced 

RV function, unable to assess RV function





MRI L ankle - osteomyelitis of the L calcaneus that looks to have significantly 

advanced when compared to 3/2017











Assess/Plan/Problems-Billing


Assessment: 





Mr. Katz is a 60 yo gentleman with known osteomyelitis of L calcaneus, poorly 

controlled diabetes, chronic RV failure and diastolic dysfunction with 

significant LE edema as well as untreated DAVID, HTN, COPD, morbid obesity and 

chronic pain who was referred to the hospital by Dr Kuhn.





 





- Patient Problems


(1) Cellulitis


Code(s): L03.90 - CELLULITIS, UNSPECIFIED   SNOMED Code(s): 029229007


   Comment: 


- With chronic osteomyelitis and non-healing DM ulcer


- Recently treated with 6-8 weeks of Vanco and oral doxycycline


- Continue Vanco per ID recommendations


- Plan for left BKA on Monday





According to the RCRI the patient has 2 point, placing him at a class III risk 

and a 6.6% chance of a major cardiac event. METs score <4. The patient is 

medically optimized at this time and may proceed to the OR. 


   





(2) Osteomyelitis


Code(s): M86.9 - OSTEOMYELITIS, UNSPECIFIED   SNOMED Code(s): 07818350


   Comment: 


- Chronic L calcaneal osteomyelitis


- Repeat MRI shows rather significant progression of the osteomyelitis since 

March


- Plan for left BKA by Dr Kuhn Monday


- MRI of the R foot is pending   





(3) Chronic heart failure


Code(s): I50.9 - HEART FAILURE, UNSPECIFIED   SNOMED Code(s): 22582363


   Comment: 


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 

appears to be chronic in nature.  RV pressure unable to be measured, but 

assumed he has pulm HTN from untreated DAVID


- Attempted CPAP, but still unable to tolerate


- Edema improving with IV Lasix   





(4) Lymphedema


Code(s): I89.0 - LYMPHEDEMA, NOT ELSEWHERE CLASSIFIED   SNOMED Code(s): 

742216207


   Comment: 


- Chronic


- Likely related to RV and diastolic failure


- Continue compression and diuresis   





(5) DAVID (obstructive sleep apnea)


Code(s): G47.33 - OBSTRUCTIVE SLEEP APNEA (ADULT) (PEDIATRIC)   SNOMED Code(s): 

63410361


   Comment: 


- Non-compliant with CPAP at home


- He reports that the machine woke him up so he stopped using it


- Attempted CPAP unable to tolerate   





(6) COPD (chronic obstructive pulmonary disease)


Code(s): J44.9 - CHRONIC OBSTRUCTIVE PULMONARY DISEASE, UNSPECIFIED   SNOMED 

Code(s): 17541097


   Comment: 


- No acute exacerbation   





(7) Diabetes


Code(s): E11.9 - TYPE 2 DIABETES MELLITUS WITHOUT COMPLICATIONS   SNOMED Code(s)

: 69078944


   Comment: 


- IDDM 


- HgbA1c 9.7%


- Continue mealtime Humalog coverage


- Will increase Lantus


   





(8) Hyperlipidemia


Code(s): E78.5 - HYPERLIPIDEMIA, UNSPECIFIED   SNOMED Code(s): 07197590


   Comment: 


- Continue statin   





(9) Chronic pain


Code(s): G89.29 - OTHER CHRONIC PAIN   SNOMED Code(s): 35918560


   Comment: 


- Chronic back, knee, shoulder, and leg pain


- Prescribed Suboxone by Dr Wilder, He had incomplete pain relief from Suboxone

, which has been stopped


- Incomplete relief from prn oxycodone


- Start oxycontin with continue prn oxycodone


- Referral to pain management at discharge   





(10) HTN (hypertension)


Code(s): I10 - ESSENTIAL (PRIMARY) HYPERTENSION   SNOMED Code(s): 94115001


   Comment: 


- Normotensive


- Continue home antihypertensives   





(11) DVT prophylaxis


Code(s): [id number] -    SNOMED Code(s): 328454755


   Comment: 


- HSQ   





(12) Full code status


Code(s): Z78.9 - OTHER SPECIFIED HEALTH STATUS   SNOMED Code(s): 672667611


   


Status and Disposition: 





Inpatient.  BKA planned for Monday.

## 2017-06-05 PROCEDURE — 0Y6J0Z1 DETACHMENT AT LEFT LOWER LEG, HIGH, OPEN APPROACH: ICD-10-PCS | Performed by: ORTHOPAEDIC SURGERY

## 2017-06-05 RX ADMIN — OXYCODONE HYDROCHLORIDE SCH: 20 TABLET, FILM COATED, EXTENDED RELEASE ORAL at 20:35

## 2017-06-05 RX ADMIN — Medication SCH: at 20:27

## 2017-06-05 RX ADMIN — Medication SCH: at 20:29

## 2017-06-05 RX ADMIN — HYDROMORPHONE HYDROCHLORIDE PRN MG: 1 INJECTION, SOLUTION INTRAMUSCULAR; INTRAVENOUS; SUBCUTANEOUS at 18:19

## 2017-06-05 RX ADMIN — NYSTATIN SCH APPLIC: 100000 CREAM TOPICAL at 08:17

## 2017-06-05 RX ADMIN — FENTANYL CITRATE PRN MCG: 0.05 INJECTION, SOLUTION INTRAMUSCULAR; INTRAVENOUS at 17:55

## 2017-06-05 RX ADMIN — DOCOSANOL SCH: 100 CREAM TOPICAL at 10:26

## 2017-06-05 RX ADMIN — MORPHINE SULFATE PRN MG: 4 INJECTION, SOLUTION INTRAMUSCULAR; INTRAVENOUS at 11:05

## 2017-06-05 RX ADMIN — INSULIN LISPRO SCH: 100 INJECTION, SOLUTION INTRAVENOUS; SUBCUTANEOUS at 20:28

## 2017-06-05 RX ADMIN — INSULIN LISPRO SCH: 100 INJECTION, SOLUTION INTRAVENOUS; SUBCUTANEOUS at 11:05

## 2017-06-05 RX ADMIN — MORPHINE SULFATE PRN MG: 4 INJECTION, SOLUTION INTRAMUSCULAR; INTRAVENOUS at 06:08

## 2017-06-05 RX ADMIN — FENTANYL CITRATE PRN MCG: 0.05 INJECTION, SOLUTION INTRAMUSCULAR; INTRAVENOUS at 17:45

## 2017-06-05 RX ADMIN — OXYCODONE HYDROCHLORIDE SCH MG: 20 TABLET, FILM COATED, EXTENDED RELEASE ORAL at 19:59

## 2017-06-05 RX ADMIN — FENTANYL CITRATE PRN MCG: 0.05 INJECTION, SOLUTION INTRAMUSCULAR; INTRAVENOUS at 17:52

## 2017-06-05 RX ADMIN — VANCOMYCIN HYDROCHLORIDE SCH MLS/HR: 1 INJECTION, POWDER, LYOPHILIZED, FOR SOLUTION INTRAVENOUS at 20:32

## 2017-06-05 RX ADMIN — INSULIN GLARGINE SCH UNIT: 100 INJECTION, SOLUTION SUBCUTANEOUS at 20:01

## 2017-06-05 RX ADMIN — CETIRIZINE HYDROCHLORIDE SCH: 10 TABLET, FILM COATED ORAL at 20:27

## 2017-06-05 RX ADMIN — INSULIN GLARGINE SCH UNIT: 100 INJECTION, SOLUTION SUBCUTANEOUS at 08:09

## 2017-06-05 RX ADMIN — HEPARIN SODIUM SCH UNITS: 5000 INJECTION INTRAVENOUS; SUBCUTANEOUS at 22:44

## 2017-06-05 RX ADMIN — DOCOSANOL SCH APPLIC: 100 CREAM TOPICAL at 22:45

## 2017-06-05 RX ADMIN — HEPARIN SODIUM SCH: 5000 INJECTION INTRAVENOUS; SUBCUTANEOUS at 12:14

## 2017-06-05 RX ADMIN — HYDROMORPHONE HYDROCHLORIDE PRN MG: 1 INJECTION, SOLUTION INTRAMUSCULAR; INTRAVENOUS; SUBCUTANEOUS at 18:10

## 2017-06-05 RX ADMIN — DOCOSANOL SCH: 100 CREAM TOPICAL at 13:45

## 2017-06-05 RX ADMIN — Medication SCH CAP: at 20:32

## 2017-06-05 RX ADMIN — FENTANYL CITRATE PRN MCG: 0.05 INJECTION, SOLUTION INTRAMUSCULAR; INTRAVENOUS at 17:49

## 2017-06-05 RX ADMIN — METFORMIN HYDROCHLORIDE SCH: 1000 TABLET, FILM COATED ORAL at 07:21

## 2017-06-05 RX ADMIN — DOCOSANOL SCH APPLIC: 100 CREAM TOPICAL at 06:08

## 2017-06-05 RX ADMIN — DOCOSANOL SCH: 100 CREAM TOPICAL at 20:29

## 2017-06-05 RX ADMIN — OMEPRAZOLE SCH: 20 CAPSULE, DELAYED RELEASE ORAL at 20:28

## 2017-06-05 RX ADMIN — HEPARIN SODIUM SCH: 5000 INJECTION INTRAVENOUS; SUBCUTANEOUS at 04:20

## 2017-06-05 RX ADMIN — ATENOLOL SCH MG: 50 TABLET ORAL at 13:46

## 2017-06-05 RX ADMIN — HYDROMORPHONE HYDROCHLORIDE PRN MG: 1 INJECTION, SOLUTION INTRAMUSCULAR; INTRAVENOUS; SUBCUTANEOUS at 17:44

## 2017-06-05 RX ADMIN — METFORMIN HYDROCHLORIDE SCH MG: 1000 TABLET, FILM COATED ORAL at 19:59

## 2017-06-05 RX ADMIN — INSULIN LISPRO SCH: 100 INJECTION, SOLUTION INTRAVENOUS; SUBCUTANEOUS at 08:34

## 2017-06-05 RX ADMIN — HYDROMORPHONE HYDROCHLORIDE PRN MG: 1 INJECTION, SOLUTION INTRAMUSCULAR; INTRAVENOUS; SUBCUTANEOUS at 17:49

## 2017-06-05 RX ADMIN — HYDROMORPHONE HYDROCHLORIDE PRN MG: 1 INJECTION, SOLUTION INTRAMUSCULAR; INTRAVENOUS; SUBCUTANEOUS at 17:54

## 2017-06-05 RX ADMIN — ATORVASTATIN CALCIUM SCH: 80 TABLET, FILM COATED ORAL at 20:29

## 2017-06-05 RX ADMIN — Medication SCH ML: at 04:25

## 2017-06-05 RX ADMIN — MORPHINE SULFATE PRN MG: 4 INJECTION, SOLUTION INTRAMUSCULAR; INTRAVENOUS at 08:08

## 2017-06-05 RX ADMIN — NYSTATIN SCH APPLIC: 100000 CREAM TOPICAL at 22:22

## 2017-06-05 RX ADMIN — LISINOPRIL SCH: 10 TABLET ORAL at 20:27

## 2017-06-05 RX ADMIN — VANCOMYCIN HYDROCHLORIDE SCH MLS/HR: 1 INJECTION, POWDER, LYOPHILIZED, FOR SOLUTION INTRAVENOUS at 08:08

## 2017-06-05 RX ADMIN — OXYCODONE HYDROCHLORIDE PRN MG: 5 CAPSULE ORAL at 20:31

## 2017-06-05 RX ADMIN — AMLODIPINE BESYLATE SCH MG: 5 TABLET ORAL at 19:59

## 2017-06-05 RX ADMIN — FUROSEMIDE SCH MG: 10 INJECTION, SOLUTION INTRAMUSCULAR; INTRAVENOUS at 08:09

## 2017-06-05 RX ADMIN — OXYCODONE HYDROCHLORIDE PRN MG: 5 CAPSULE ORAL at 13:47

## 2017-06-05 NOTE — PN
Subjective


Date of Service: 06/05/17


Interval History: 





Patient seen and examined at bedside. Pt states that he is feeling well with 

the exception of increased pain in his left LE, he states that it hurts to bear 

weight on it today. Denies fever, chill, lightheadedness, shortness of breath, 

chest discomfort, N/V/D. Pt states that he is urinating and moving his bowels 

without difficultly. Pt also feels that the blister on his lip is improving. 








Family History: Unchanged from Admission


Social History: Unchanged from Admission


Past Medical History: Unchanged from Admission





Objective


Active Medications: 





Acetaminophen (Tylenol Tab*)  650 mg PO Q4H PRN Reason: FEVER/PAIN


Al Hydrox/Mg Hydrox/Simethicone (Maalox Plus*)  30 ml PO Q6H PRN Reason: 

INDIGESTION


Amlodipine Besylate (Norvasc Tab*)  5 mg PO BEDTIME ERNST


Atenolol (Tenormin Tab*)  50 mg PO QAM ERNST


Atorvastatin Calcium (Lipitor*)  80 mg PO DAILY ERNST


Calcium Carbonate (Tums*)  1,000 mg PO Q4H PRN Reason: reflux


Cetirizine HCl (Zyrtec*)  10 mg PO DAILY ERNST Reason: Protocol


Dextrose (D50w Syringe 50 Ml*)  12.5 gm IV PUSH .FOR FS < 60 - SS PRN Reason: 

FS < 60


Docosanol (Abreva 10%*)  1 applic TOPICAL FIVE TIMES DAILY ERNST


Furosemide (Lasix Iv*)  20 mg IV SLOW PU DAILY [facility]


Heparin Sodium (Porcine) (Heparin Vial(*))  5,000 units SUBCUT Q8HR [facility]


Heparin Sodium (Porcine) (Heparin Flush Picc/Ml/Cvc(*))  1 ml FLUSH 0600,1800 

ERNST Reason: Protocol


Vancomycin HCl 1,250 mg/ (Sodium Chloride)  250 mls @ 166.667 mls/hr IVPB Q8H 

ERNST


Insulin Glargine (Lantus(*))  55 units SUBCUT BID ERNST


Insulin Human Lispro (Humalog*)  20 units SUBCUT AC ERNST Reason: Protocol


Lactobacillus Rhamnosus (Culturelle*)  1 cap PO BID ERNST


Lisinopril (Prinivil Tab*)  30 mg PO DAILY ERNST


Metformin HCl (Glucophage*)  1,000 mg PO BID WITH MEALS [facility]


Morphine Sulfate (Morphine Inj (Syringe)*)  4 mg IV Q2H PRN Reason: PAIN


Nystatin (Nystatin Cream*)  1 applic TOPICAL BID [facility]


Omeprazole (Prilosec Cap*)  20 mg PO DAILY@0730 [facility]


Ondansetron HCl (Zofran Inj*)  4 mg IV Q4H PRN Reason: NAUSEA/VOMITING


Oxycodone HCl (Roxycodone Tab*)  10 mg PO Q4H PRN Reason: PAIN


Oxycodone HCl (Oxycontin(*))  20 mg PO BID [facility]


Pharmacy Consult (Vancomycin Per Pharmacy*)  1 note FOLLOW UP . PRN Reason: PER 

PROTOCOL


Pharmacy Profile Note (Vancomycin Trough Check)  1 note FOLLOW UP 0730 [facility]

: 06/06/17 07:31





 Vital Signs











  06/04/17 06/04/17 06/04/17





  12:31 13:31 16:08


 


Temperature   98.5 F


 


Pulse Rate   72


 


Respiratory 18 18 17





Rate   


 


Blood Pressure   130/73





(mmHg)   


 


O2 Sat by Pulse   91





Oximetry   




















  06/05/17 06/05/17 06/05/17





  00:22 06:08 07:08


 


Temperature 99.7 F  


 


Pulse Rate 88  


 


Respiratory 17 16 18





Rate   


 


Blood Pressure 115/52  





(mmHg)   


 


O2 Sat by Pulse 94  





Oximetry   














  06/05/17 06/05/17 06/05/17





  07:54 08:00 08:08


 


Temperature 98.0 F  


 


Pulse Rate 77  


 


Respiratory 16 18 16





Rate   


 


Blood Pressure 115/56  





(mmHg)   


 


O2 Sat by Pulse 86  





Oximetry   














Oxygen Devices in Use Now: None


Appearance: NAD, sitting up in a chair


Eyes: No Scleral Icterus


Ears/Nose/Mouth/Throat: Mucous Membranes Moist


Respiratory: Symmetrical Chest Expansion and Respiratory Effort, Clear to 

Auscultation - , diminished


Cardiovascular: NL Sounds; No Murmurs; No JVD, RRR, - - Bilateral LE edema


Skin: - - ACE wrap dressings to bilateral LE with drainage. Bilster to right 

side of lower lip improving.


Neurological: Alert and Oriented x 3, NL Muscle Strength and Tone


Nutrition: Taking PO's


Result Diagrams: 


 06/04/17 08:12





 06/03/17 08:20


Microbiology and Other Data: 


 Microbiology











 05/31/17 07:54 Aerobic Blood Culture - Preliminary





 Blood Venous    No Growth Day 1





 Anaerobic Blood Culture - Preliminary





    No Growth Day 1


 


 05/30/17 23:34 Aerobic Blood Culture - Preliminary





 Blood Venous    No Growth Day 1





 Anaerobic Blood Culture - Preliminary





    No Growth Day 1











Diagnostic Imaging: 





Echo 5/8/17 - LVEF 55-60%, mod LVH, diastolic dysfunction, mild to mod reduced 

RV function, unable to assess RV function





MRI L ankle - osteomyelitis of the L calcaneus that looks to have significantly 

advanced when compared to 3/2017





MRI R foot - Moderate degree of subcutaneous edema. No evidence of bone marrow 

edema or replacement to suggest osteomyelitis is present.











Assess/Plan/Problems-Billing


Assessment: 





Mr. Katz is a 62 yo gentleman with known osteomyelitis of L calcaneus, poorly 

controlled diabetes, chronic RV failure and diastolic dysfunction with 

significant LE edema as well as untreated DAVID, HTN, COPD, morbid obesity and 

chronic pain who was referred to the hospital by Dr Kuhn.





 





- Patient Problems


(1) Cellulitis


Code(s): L03.90 - CELLULITIS, UNSPECIFIED   SNOMED Code(s): 143039955


   Comment: 


- With chronic osteomyelitis and non-healing DM ulcer


- Recently treated with 6-8 weeks of Vanco and oral doxycycline


- Continue Vanco per ID recommendations


- Plan for left BKA Today





According to the RCRI the patient has 2 point, placing him at a class III risk 

and a 6.6% chance of a major cardiac event. METs score <4. The patient is 

medically optimized at this time and may proceed to the OR. 


   





(2) Osteomyelitis


Code(s): M86.9 - OSTEOMYELITIS, UNSPECIFIED   SNOMED Code(s): 25436141


   Comment: 


- Chronic L calcaneal osteomyelitis


- Repeat MRI shows rather significant progression of the osteomyelitis since 

March


- MRI of the R foot - moderate degree of subcutaneous edema. No evidence bone 

marrow edema ot replacement to suggest ostromyelitits.


- Plan for left BKA by Dr Kuhn Today


   





(3) Chronic heart failure


Code(s): I50.9 - HEART FAILURE, UNSPECIFIED   SNOMED Code(s): 64494244


   Comment: 


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 

appears to be chronic in nature.  RV pressure unable to be measured, but 

assumed he has pulm HTN from untreated DAVID


- Attempted CPAP, but still unable to tolerate


- Edema improving with IV Lasix


- Continue IV Lasix for now   





(4) Lymphedema


Code(s): I89.0 - LYMPHEDEMA, NOT ELSEWHERE CLASSIFIED   SNOMED Code(s): 

401259306


   Comment: 


- Chronic


- Likely related to RV and diastolic failure


- Continue compression and diuresis   





(5) DAVID (obstructive sleep apnea)


Code(s): G47.33 - OBSTRUCTIVE SLEEP APNEA (ADULT) (PEDIATRIC)   SNOMED Code(s): 

53431328


   Comment: 


- Non-compliant with CPAP at home


- He reports that the machine woke him up so he stopped using it


- Attempted CPAP unable to tolerate   





(6) COPD (chronic obstructive pulmonary disease)


Code(s): J44.9 - CHRONIC OBSTRUCTIVE PULMONARY DISEASE, UNSPECIFIED   SNOMED 

Code(s): 99920343


   Comment: 


- No acute exacerbation   





(7) Diabetes


Code(s): E11.9 - TYPE 2 DIABETES MELLITUS WITHOUT COMPLICATIONS   SNOMED Code(s)

: 83157560


   Comment: 


- IDDM 


- HgbA1c 9.7%


- Continue mealtime Humalog coverage


- Continue Lantus BID


   





(8) Hyperlipidemia


Code(s): E78.5 - HYPERLIPIDEMIA, UNSPECIFIED   SNOMED Code(s): 42813786


   Comment: 


- Continue statin   





(9) Chronic pain


Code(s): G89.29 - OTHER CHRONIC PAIN   SNOMED Code(s): 14128284


   Comment: 


- Chronic back, knee, shoulder, and leg pain


- Prescribed Suboxone by Dr Wilder, He had incomplete pain relief from Suboxone

, which has been stopped


- Incomplete relief from prn oxycodone


- Continue oxycontin with prn oxycodone


- Referral to pain management at discharge   





(10) HTN (hypertension)


Code(s): I10 - ESSENTIAL (PRIMARY) HYPERTENSION   SNOMED Code(s): 67656778


   Comment: 


- Normotensive


- Continue home antihypertensives   





(11) DVT prophylaxis


Code(s): ZJA3074 -    SNOMED Code(s): 325165686


   Comment: 


- HSQ   





(12) Full code status


Code(s): Z78.9 - OTHER SPECIFIED HEALTH STATUS   SNOMED Code(s): 053129920


   


Status and Disposition: 





Inpatient.  BKA planned for Monday. Pt will likely need rehab.

## 2017-06-06 LAB
ANION GAP SERPL CALC-SCNC: 5 MMOL/L (ref 2–11)
BUN SERPL-MCNC: 15 MG/DL (ref 6–24)
BUN/CREAT SERPL: 23.8 (ref 8–20)
CALCIUM SERPL-MCNC: 9 MG/DL (ref 8.6–10.3)
CHLORIDE SERPL-SCNC: 93 MMOL/L (ref 101–111)
GLUCOSE SERPL-MCNC: 128 MG/DL (ref 70–100)
HCO3 SERPL-SCNC: 35 MMOL/L (ref 22–32)
HCT VFR BLD AUTO: 34 % (ref 42–52)
HGB BLD-MCNC: 10.6 G/DL (ref 14–18)
MCH RBC QN AUTO: 25 PG (ref 27–31)
MCHC RBC AUTO-ENTMCNC: 32 G/DL (ref 31–36)
MCV RBC AUTO: 77 FL (ref 80–94)
POTASSIUM SERPL-SCNC: 3.9 MMOL/L (ref 3.5–5)
RBC # BLD AUTO: 4.33 10^6/UL (ref 4–5.4)
SODIUM SERPL-SCNC: 133 MMOL/L (ref 133–145)
WBC # BLD AUTO: 18.2 10^3/UL (ref 3.5–10.8)

## 2017-06-06 PROCEDURE — 02HV33Z INSERTION OF INFUSION DEVICE INTO SUPERIOR VENA CAVA, PERCUTANEOUS APPROACH: ICD-10-PCS | Performed by: HOSPITALIST

## 2017-06-06 RX ADMIN — NYSTATIN SCH: 100000 CREAM TOPICAL at 10:05

## 2017-06-06 RX ADMIN — Medication SCH: at 18:24

## 2017-06-06 RX ADMIN — INSULIN GLARGINE SCH UNIT: 100 INJECTION, SOLUTION SUBCUTANEOUS at 09:57

## 2017-06-06 RX ADMIN — OXYCODONE HYDROCHLORIDE PRN MG: 5 CAPSULE ORAL at 18:19

## 2017-06-06 RX ADMIN — INSULIN LISPRO SCH: 100 INJECTION, SOLUTION INTRAVENOUS; SUBCUTANEOUS at 10:09

## 2017-06-06 RX ADMIN — HEPARIN SODIUM SCH UNITS: 5000 INJECTION INTRAVENOUS; SUBCUTANEOUS at 05:03

## 2017-06-06 RX ADMIN — Medication SCH CAP: at 21:13

## 2017-06-06 RX ADMIN — FUROSEMIDE SCH MG: 10 INJECTION, SOLUTION INTRAMUSCULAR; INTRAVENOUS at 09:57

## 2017-06-06 RX ADMIN — OXYCODONE HYDROCHLORIDE SCH MG: 20 TABLET, FILM COATED, EXTENDED RELEASE ORAL at 10:00

## 2017-06-06 RX ADMIN — INSULIN GLARGINE SCH UNIT: 100 INJECTION, SOLUTION SUBCUTANEOUS at 21:14

## 2017-06-06 RX ADMIN — HEPARIN SODIUM SCH UNITS: 5000 INJECTION INTRAVENOUS; SUBCUTANEOUS at 14:33

## 2017-06-06 RX ADMIN — ATENOLOL SCH MG: 50 TABLET ORAL at 09:57

## 2017-06-06 RX ADMIN — OXYCODONE HYDROCHLORIDE PRN MG: 5 CAPSULE ORAL at 04:59

## 2017-06-06 RX ADMIN — OXYCODONE HYDROCHLORIDE SCH MG: 20 TABLET, FILM COATED, EXTENDED RELEASE ORAL at 21:13

## 2017-06-06 RX ADMIN — ATORVASTATIN CALCIUM SCH MG: 80 TABLET, FILM COATED ORAL at 09:57

## 2017-06-06 RX ADMIN — DOCOSANOL SCH APPLIC: 100 CREAM TOPICAL at 05:01

## 2017-06-06 RX ADMIN — INSULIN LISPRO SCH: 100 INJECTION, SOLUTION INTRAVENOUS; SUBCUTANEOUS at 13:34

## 2017-06-06 RX ADMIN — CETIRIZINE HYDROCHLORIDE SCH MG: 10 TABLET, FILM COATED ORAL at 09:56

## 2017-06-06 RX ADMIN — AMLODIPINE BESYLATE SCH MG: 5 TABLET ORAL at 21:13

## 2017-06-06 RX ADMIN — METFORMIN HYDROCHLORIDE SCH MG: 1000 TABLET, FILM COATED ORAL at 09:57

## 2017-06-06 RX ADMIN — INSULIN LISPRO SCH UNIT: 100 INJECTION, SOLUTION INTRAVENOUS; SUBCUTANEOUS at 18:20

## 2017-06-06 RX ADMIN — LISINOPRIL SCH MG: 10 TABLET ORAL at 09:57

## 2017-06-06 RX ADMIN — HEPARIN SODIUM SCH UNITS: 5000 INJECTION INTRAVENOUS; SUBCUTANEOUS at 22:24

## 2017-06-06 RX ADMIN — OMEPRAZOLE SCH MG: 20 CAPSULE, DELAYED RELEASE ORAL at 09:57

## 2017-06-06 RX ADMIN — OXYCODONE HYDROCHLORIDE PRN MG: 5 CAPSULE ORAL at 00:11

## 2017-06-06 RX ADMIN — DOCOSANOL SCH APPLIC: 100 CREAM TOPICAL at 18:20

## 2017-06-06 RX ADMIN — DOCOSANOL SCH APPLIC: 100 CREAM TOPICAL at 10:05

## 2017-06-06 RX ADMIN — VANCOMYCIN HYDROCHLORIDE SCH MLS/HR: 1 INJECTION, POWDER, LYOPHILIZED, FOR SOLUTION INTRAVENOUS at 21:16

## 2017-06-06 RX ADMIN — VANCOMYCIN HYDROCHLORIDE SCH MLS/HR: 1 INJECTION, POWDER, LYOPHILIZED, FOR SOLUTION INTRAVENOUS at 13:21

## 2017-06-06 RX ADMIN — Medication SCH CAP: at 09:56

## 2017-06-06 RX ADMIN — OXYCODONE HYDROCHLORIDE PRN MG: 5 CAPSULE ORAL at 09:36

## 2017-06-06 RX ADMIN — DOCOSANOL SCH: 100 CREAM TOPICAL at 14:53

## 2017-06-06 RX ADMIN — DOCOSANOL SCH: 100 CREAM TOPICAL at 23:12

## 2017-06-06 RX ADMIN — VANCOMYCIN HYDROCHLORIDE SCH MLS/HR: 1 INJECTION, POWDER, LYOPHILIZED, FOR SOLUTION INTRAVENOUS at 04:14

## 2017-06-06 RX ADMIN — Medication SCH ML: at 05:01

## 2017-06-06 NOTE — OP
DATE OF OPERATION:  06/05/17 - ROOM #336

 

DATE OF BIRTH:  [birth year]

 

SURGEON:  Jose Kuhn MD

 

ASSISTANT:  Inez Carrion PA-C



ANESTHESIOLOGIST:  Max Miller MD



ANESTHESIA:  Spinal

 

PRE-OP DIAGNOSIS:  Chronic osteomyelitis, left calcaneus.

 

POST-OP DIAGNOSIS:  Chronic osteomyelitis, left calcaneus.

 

OPERATIVE PROCEDURE:  Left transtibial amputation.

 

DESCRIPTION OF PROCEDURE:  The patient was taken to the operating room where 
isolation bag was placed around his infected left foot.  With the thigh 
tourniquet raised, we made a fishmouth incision at the midthird of his left 
calf with a shorter anterior flap.  This was retracted proximally where we 
divided the tibia handbreadth below the tubercle and the fibula just a 
centimeter proximal to that. We divided the soft tissues of the anterior 
compartment, flexed through the osteotomy to expose the posterior compartment; 
this was divided with a #10 blade. The leg was then delivered to Pathology.  
Local cultures were made at this level. There was significant edema in the limb 
and some hyperemia.  We attempted suture ligature of all the major vessels and 
then dropped the tourniquet, but there was still some fresh bleeding 
particularly at the inner osseous vessels, these were controlled also with 
suture ligature and then generally oozing controlled with the electrocautery.  
The irrigation was performed 2 L and then closure of #1 Vicryl for the deep 
fascial layers and 0 Vicryl for the more subcutaneous tissue.  It was difficult 
closing the subcutaneous tissue because of the edema and the friability of the 
skin itself.  2-0 Surgipro sutures were used as well.  Compression dressing, 
plaster splint was applied to the amputation.  The blood loss was approximately 
250 cc.

 

 [id number]/CPS #: [id number]

[facility]

## 2017-06-06 NOTE — PN
Subjective


Date of Service: 06/06/17


Interval History: 





Mr. Katz denies complaint today.  He specifically denies pain to her left leg.

  He denies chest pain, SOB, nausea, or abdominal pain.








Family History: Unchanged from Admission


Social History: Unchanged from Admission


Past Medical History: Unchanged from Admission





Objective


Active Medications: 





Acetaminophen (Tylenol Tab*)  650 mg PO Q4H PRN


Al Hydrox/Mg Hydrox/Simethicone (Maalox Plus*)  30 ml PO Q6H PRN


Amlodipine Besylate (Norvasc Tab*)  5 mg PO BEDTIME ERNST


Atenolol (Tenormin Tab*)  50 mg PO QAM ERNST


Atorvastatin Calcium (Lipitor*)  80 mg PO DAILY ERN[facility]


Calcium Carbonate (Tums*)  1,000 mg PO Q4H PRN


Cetirizine HCl (Zyrtec*)  [facility] PO [facility]ILY [facility]


Dextrose (D50w Syringe 50 Ml*)  12.5 gm IV PUSH .FOR FS < 60 - SS PRN


Docosanol (Abreva 10%*)  1 applic TOPICAL FIVE TIMES DAILY [facility]


Furosemide (Lasix Iv*)  20 mg IV SLOW PU DAILY [facility]


Heparin Sodium (Porcine) (Heparin Vial(*))  5,000 units SUBCUT Q8HR [facility]


Heparin Sodium (Porcine) (Heparin Flush Picc/Ml/Cvc(*))  1 ml FLUSH 0600,1800 

ERNST


Hydromorphone HCl (Dilaudid Pca*)  20 mg in 20 mls @ 0 mls/hr PCA .change Q24H 

ERNST; Per Protocol


Lactated Ringer's (Lactated Ringers 1000 Ml Bag*)  1,000 mls @ 75 mls/hr IV PER 

RATE ERNST


Vancomycin HCl 1,250 mg/ (Sodium Chloride)  250 mls @ 166.667 mls/hr IVPB 0430,

1230,2030 ERNST


Insulin Glargine (Lantus(*))  55 units SUBCUT BID ERN[facility]


Insulin Human Lispro (Humalog*)  20 units SUBCUT AC ERN[facility]


Lactobacillus Rhamnosus (Culturelle*)  1 cap PO BID ERN[facility]


Lorazepam (Ativan Inj*)  1 mg IV PUSH Q6H PRN


Metformin HCl (Glucophage*)  1,000 mg PO BID WITH MEALS ERNST


Morphine Sulfate (Morphine Inj (Syringe)*)  4 mg IV Q2H PRN


Nystatin (Nystatin Cream*)  1 applic TOPICAL BID ERNST


Omeprazole (Prilosec Cap*)  20 mg PO DAILY@0730 [facility]


Ondansetron HCl (Zofran Inj*)  4 mg IV Q4H PRN


Oxycodone HCl (Roxycodone Tab*)  10 mg PO Q4H PRN


Oxycodone HCl (Oxycontin(*))  20 mg PO BID [facility]


Pharmacy Consult (Vancomycin Per Pharmacy*)  1 note FOLLOW UP . PRN





 Vital Signs











  06/05/17 06/05/17 06/05/17





  13:47 17:36 17:40


 


Temperature  97.9 F 


 


Pulse Rate  79 79


 


Respiratory 20 18 16





Rate   


 


Blood Pressure  146/99 184/106





(mmHg)   


 


O2 Sat by Pulse  95 96





Oximetry   














  06/05/17 06/05/17 06/05/17





  17:44 17:45 17:49


 


Temperature   


 


Pulse Rate  79 


 


Respiratory 16 16 15





Rate   


 


Blood Pressure  170/98 





(mmHg)   


 


O2 Sat by Pulse  97 





Oximetry   














  06/05/17 06/05/17 06/05/17





  17:50 17:52 17:54


 


Temperature   


 


Pulse Rate 79  


 


Respiratory 15 22 20





Rate   


 


Blood Pressure 158/99  





(mmHg)   


 


O2 Sat by Pulse 97  





Oximetry   














  06/05/17 06/05/17 06/05/17





  17:55 18:00 18:10


 


Temperature   


 


Pulse Rate  79 


 


Respiratory 16 20 17





Rate   


 


Blood Pressure  136/95 





(mmHg)   


 


O2 Sat by Pulse  96 





Oximetry   














  06/05/17 06/05/17 06/05/17





  18:15 18:19 18:30


 


Temperature 97.2 F  


 


Pulse Rate 78  78


 


Respiratory 24 20 15





Rate   


 


Blood Pressure 123/86  147/83





(mmHg)   


 


O2 Sat by Pulse 96  96





Oximetry   














  06/05/17 06/05/17 06/05/17





  18:45 19:00 19:15


 


Temperature   


 


Pulse Rate 79 80 82


 


Respiratory 15 15 17





Rate   


 


Blood Pressure 165/90 177/70 138/73





(mmHg)   


 


O2 Sat by Pulse 96 96 96





Oximetry   














  06/05/17 06/05/17 06/05/17





  19:38 19:42 19:59


 


Temperature  98.0 F 


 


Pulse Rate  84 


 


Respiratory 22 20 18





Rate   


 


Blood Pressure  148/71 





(mmHg)   


 


O2 Sat by Pulse 92 96 





Oximetry   














  06/05/17 06/05/17 06/05/17





  20:31 20:38 20:48


 


Temperature   


 


Pulse Rate   


 


Respiratory 22 22 22





Rate   


 


Blood Pressure   





(mmHg)   


 


O2 Sat by Pulse  94 





Oximetry   














  06/05/17 06/05/17 06/05/17





  20:57 21:38 21:49


 


Temperature 98.5 F  98.5 F


 


Pulse Rate 91  92


 


Respiratory 18 22 19





Rate   


 


Blood Pressure 151/81  158/69





(mmHg)   


 


O2 Sat by Pulse 95 94 95





Oximetry   














  06/05/17 06/05/17 06/05/17





  21:59 22:31 22:38


 


Temperature   


 


Pulse Rate   


 


Respiratory 18 22 20





Rate   


 


Blood Pressure   





(mmHg)   


 


O2 Sat by Pulse   92





Oximetry   














  06/05/17 06/06/17 06/06/17





  23:59 00:00 00:11


 


Temperature 98.8 F  


 


Pulse Rate 102  


 


Respiratory 18  22





Rate   


 


Blood Pressure 149/76  





(mmHg)   


 


O2 Sat by Pulse 96 96 





Oximetry   














  06/06/17 06/06/17 06/06/17





  00:12 00:38 01:12


 


Temperature   


 


Pulse Rate   


 


Respiratory 22 20 18





Rate   


 


Blood Pressure   





(mmHg)   


 


O2 Sat by Pulse  92 





Oximetry   














  06/06/17 06/06/17 06/06/17





  02:00 02:11 02:19


 


Temperature   99.6 F


 


Pulse Rate   107


 


Respiratory 20 16 16





Rate   


 


Blood Pressure   166/83





(mmHg)   


 


O2 Sat by Pulse 94  95





Oximetry   














  06/06/17 06/06/17 06/06/17





  02:22 02:24 03:39


 


Temperature   99.1 F


 


Pulse Rate 107  108


 


Respiratory   18





Rate   


 


Blood Pressure 152/82  158/76





(mmHg)   


 


O2 Sat by Pulse  96 98





Oximetry   














  06/06/17 06/06/17 06/06/17





  04:00 04:59 06:00


 


Temperature   


 


Pulse Rate   


 


Respiratory 16 18 18





Rate   


 


Blood Pressure   





(mmHg)   


 


O2 Sat by Pulse 92  94





Oximetry   














  06/06/17 06/06/17 06/06/17





  06:45 08:00 08:05


 


Temperature   97.9 F


 


Pulse Rate   110


 


Respiratory 18 18 20





Rate   


 


Blood Pressure   131/81





(mmHg)   


 


O2 Sat by Pulse  93 89





Oximetry   














  06/06/17 06/06/17 06/06/17





  09:36 10:00 10:45


 


Temperature   


 


Pulse Rate   


 


Respiratory 16 16 16





Rate   


 


Blood Pressure   





(mmHg)   


 


O2 Sat by Pulse  94 





Oximetry   














  06/06/17 06/06/17 06/06/17





  11:36 12:00 12:01


 


Temperature   98.6 F


 


Pulse Rate   82


 


Respiratory 18 16 20





Rate   


 


Blood Pressure   133/69





(mmHg)   


 


O2 Sat by Pulse  96 97





Oximetry   











Oxygen Devices in Use Now: None


Eyes: No Scleral Icterus


Ears/Nose/Mouth/Throat: Mucous Membranes Moist


Neck: NL Appearance and Movements; NL JVP


Respiratory: Symmetrical Chest Expansion and Respiratory Effort, - - diminished 

bilaterally


Cardiovascular: NL Sounds; No Murmurs; No JVD, - - +2 pitting edema to LEs


Abdominal: NL Sounds; No Tenderness; No Distention


Extremities: - - +2 pitting edema to LEs


Skin: - - Left BKA dressing CDI


Neurological: NL Muscle Strength and Tone, - - drowsy but oriented x 3


Nutrition: Taking PO's


Result Diagrams: 


 06/06/17 06:05





 06/06/17 06:05


Microbiology and Other Data: 


 Microbiology











 05/31/17 07:54 Aerobic Blood Culture - Preliminary





 Blood Venous    No Growth Day 1





 Anaerobic Blood Culture - Preliminary





    No Growth Day 1


 


 05/30/17 23:34 Aerobic Blood Culture - Preliminary





 Blood Venous    No Growth Day 1





 Anaerobic Blood Culture - Preliminary





    No Growth Day 1











Diagnostic Imaging: 





Echo 5/8/17 - LVEF 55-60%, mod LVH, diastolic dysfunction, mild to mod reduced 

RV function, unable to assess RV function





MRI L ankle - osteomyelitis of the L calcaneus that looks to have significantly 

advanced when compared to 3/2017





MRI R foot - Moderate degree of subcutaneous edema. No evidence of bone marrow 

edema or replacement to suggest osteomyelitis is present.











Assess/Plan/Problems-Billing


Assessment: 





Mr. Katz is a 60 yo gentleman with known osteomyelitis of L calcaneus, poorly 

controlled diabetes, chronic RV failure and diastolic dysfunction with 

significant LE edema as well as untreated DAVID, HTN, COPD, morbid obesity and 

chronic pain who was referred to the hospital by Dr Kuhn.





 





- Patient Problems


(1) Cellulitis


Comment: Now s/p left BKA.  Continue Vanco per ID recommendation.   





(2) COPD (chronic obstructive pulmonary disease)


Comment: No acute exacerbation   





(3) Chronic heart failure


Comment: Asymptomatic.  Echo from earlier this month shows RV failure and 

diastolic dysfunction which appears to be chronic in nature.  RV pressure 

unable to be measured, but assumed he has pulm HTN from untreated DAVID.  

Attempted CPAP, but still unable to tolerate.  Switch to oral lasix, stop IV 

fluids.   





(4) DAVID (obstructive sleep apnea)


Comment: Non-compliant with CPAP at home.  Attempted CPAP here but pt unable to 

tolerate.   





(5) Diabetes


Comment: IDDM.  HgbA1c 9.7%.   this AM.  Continue lantus with mealtime 

Humalog coverage.   





(6) Hyperlipidemia


Comment: Continue statin   





(7) Morbid obesity


Comment: BMI 50.  Bariatric surgery within the last 12 months with 65# weight 

loss   





(8) Chronic pain


Comment: Chronic back, knee, shoulder, and leg pain.  Prescribed Suboxone by Dr Wilder, He had incomplete pain relief from Suboxone, which has been stopped.  

Continue oxycontin with prn oxycodone.  Referral to pain management at 

discharge   





(9) HTN (hypertension)


Comment: BP well controlled. Continue lisinopril, atenolol.   





(10) Lymphedema


Comment: Chronic,  Likely related to RV and diastolic failure, Continue 

compression and diuresis.   





(11) DVT prophylaxis


Comment: HSQ   





(12) Full code status





Status and Disposition: 





Inpatient.  Pt will likely need rehab.

## 2017-06-06 NOTE — PN
Progress Note





- Progress Note


SOAP: 


Subjective:


[]Patient seen after being moved via Yonis lift to his chair.  Reports 10 out 

of 10 pain despite oral pain meds and PCA.  Alert and oriented but dozes off 

intermittantly with myself and Edel from therapy present. 02 mask on. 








Objective:


[]


 Vital Signs











Temp  97.9 F   06/06/17 08:05


 


Pulse  110   06/06/17 08:05


 


Resp  16   06/06/17 10:00


 


BP  131/81   06/06/17 08:05


 


Pulse Ox  90   06/06/17 08:05








 Intake & Output











 06/05/17 06/06/17 06/06/17





 18:59 06:59 18:59


 


Intake Total 1120 840 


 


Output Total 2150 550 


 


Balance -1030 290 


 


Weight   350 lb 11.2 oz


 


Intake:   


 


  IV Fluids 1120  


 


    ABX - VANCOMYCIN 290  


 


    NS (0.9%) 30  


 


    lr 800  


 


  Oral 0 840 


 


Output:   


 


  Urine 2150 550 


 


Other:   


 


  Estimated Void Large Large Medium


 


  # Bowel Movements 0 0 


 


  # Voids  1 1








 Laboratory Results - last 24 hr











  06/05/17 06/05/17 06/05/17





  11:34 13:44 17:48


 


WBC   


 


RBC   


 


Hgb   


 


Hct   


 


MCV   


 


MCH   


 


MCHC   


 


RDW   


 


Plt Count   


 


MPV   


 


Neut % (Auto)   


 


Lymph % (Auto)   


 


Mono % (Auto)   


 


Eos % (Auto)   


 


Baso % (Auto)   


 


Absolute Neuts (auto)   


 


Absolute Lymphs (auto)   


 


Absolute Monos (auto)   


 


Absolute Eos (auto)   


 


Absolute Basos (auto)   


 


Absolute Nucleated RBC   


 


Nucleated RBC %   


 


Sodium   


 


Potassium   


 


Chloride   


 


Carbon Dioxide   


 


Anion Gap   


 


BUN   


 


Creatinine   


 


Est GFR ( Amer)   


 


Est GFR (Non-Af Amer)   


 


BUN/Creatinine Ratio   


 


Glucose   


 


POC Glucose (mg/dL)  213 H  192 H  142 H


 


Calcium   














  06/05/17 06/05/17 06/06/17





  19:57 23:58 06:05


 


WBC   


 


RBC   


 


Hgb   


 


Hct   


 


MCV   


 


MCH   


 


MCHC   


 


RDW   


 


Plt Count   


 


MPV   


 


Neut % (Auto)   


 


Lymph % (Auto)   


 


Mono % (Auto)   


 


Eos % (Auto)   


 


Baso % (Auto)   


 


Absolute Neuts (auto)   


 


Absolute Lymphs (auto)   


 


Absolute Monos (auto)   


 


Absolute Eos (auto)   


 


Absolute Basos (auto)   


 


Absolute Nucleated RBC   


 


Nucleated RBC %   


 


Sodium   


 


Potassium   


 


Chloride   


 


Carbon Dioxide   


 


Anion Gap   


 


BUN    14


 


Creatinine   


 


Est GFR ( Amer)   


 


Est GFR (Non-Af Amer)   


 


BUN/Creatinine Ratio   


 


Glucose   


 


POC Glucose (mg/dL)  153 H  162 H 


 


Calcium   














  06/06/17 06/06/17 06/06/17





  06:05 06:05 08:05


 


WBC  18.2 H  


 


RBC  4.33  


 


Hgb  10.6 L  


 


Hct  34 L  


 


MCV  77 L  


 


MCH  25 L  


 


MCHC  32  


 


RDW  16 H  


 


Plt Count  304  


 


MPV  7 L  


 


Neut % (Auto)  87.9 H  


 


Lymph % (Auto)  3.6 L  


 


Mono % (Auto)  7.8  


 


Eos % (Auto)  0  


 


Baso % (Auto)  0.7  


 


Absolute Neuts (auto)  16.0 H  


 


Absolute Lymphs (auto)  0.7 L  


 


Absolute Monos (auto)  1.4 H  


 


Absolute Eos (auto)  0  


 


Absolute Basos (auto)  0.1  


 


Absolute Nucleated RBC  0  


 


Nucleated RBC %  0  


 


Sodium   133 


 


Potassium   3.9 


 


Chloride   93 L 


 


Carbon Dioxide   35 H 


 


Anion Gap   5 


 


BUN   15 


 


Creatinine   0.63 L 


 


Est GFR ( Amer)   167.6 


 


Est GFR (Non-Af Amer)   130.4 


 


BUN/Creatinine Ratio   23.8 H 


 


Glucose   128 H 


 


POC Glucose (mg/dL)    169 H


 


Calcium   9.0 








Left LE stump dressing is dry and intact











Assessment:


[]s/p transtibial amputation Left LE for chronic calcaneal osteomyelitis 


 








Plan:


[]PT/OT as tolerated


  Pain control


  Vancomycin


  Will need short term rehab

## 2017-06-07 RX ADMIN — INSULIN LISPRO SCH UNIT: 100 INJECTION, SOLUTION INTRAVENOUS; SUBCUTANEOUS at 18:25

## 2017-06-07 RX ADMIN — VANCOMYCIN HYDROCHLORIDE SCH MLS/HR: 1 INJECTION, POWDER, LYOPHILIZED, FOR SOLUTION INTRAVENOUS at 13:00

## 2017-06-07 RX ADMIN — Medication SCH CAP: at 21:18

## 2017-06-07 RX ADMIN — ACETAMINOPHEN PRN MG: 325 TABLET ORAL at 21:16

## 2017-06-07 RX ADMIN — DOCOSANOL SCH APPLIC: 100 CREAM TOPICAL at 05:52

## 2017-06-07 RX ADMIN — DOCOSANOL SCH APPLIC: 100 CREAM TOPICAL at 22:39

## 2017-06-07 RX ADMIN — LISINOPRIL SCH MG: 10 TABLET ORAL at 09:25

## 2017-06-07 RX ADMIN — NYSTATIN SCH APPLIC: 100000 POWDER TOPICAL at 11:38

## 2017-06-07 RX ADMIN — Medication SCH: at 05:50

## 2017-06-07 RX ADMIN — INSULIN GLARGINE SCH UNIT: 100 INJECTION, SOLUTION SUBCUTANEOUS at 09:31

## 2017-06-07 RX ADMIN — HEPARIN SODIUM SCH UNITS: 5000 INJECTION INTRAVENOUS; SUBCUTANEOUS at 05:49

## 2017-06-07 RX ADMIN — DOCOSANOL SCH APPLIC: 100 CREAM TOPICAL at 18:25

## 2017-06-07 RX ADMIN — CETIRIZINE HYDROCHLORIDE SCH MG: 10 TABLET, FILM COATED ORAL at 09:26

## 2017-06-07 RX ADMIN — CEFEPIME HYDROCHLORIDE SCH MLS/HR: 2 INJECTION, POWDER, FOR SOLUTION INTRAVENOUS at 11:30

## 2017-06-07 RX ADMIN — OXYCODONE HYDROCHLORIDE PRN MG: 5 CAPSULE ORAL at 05:46

## 2017-06-07 RX ADMIN — OMEPRAZOLE SCH MG: 20 CAPSULE, DELAYED RELEASE ORAL at 09:26

## 2017-06-07 RX ADMIN — NYSTATIN SCH APPLIC: 100000 POWDER TOPICAL at 21:17

## 2017-06-07 RX ADMIN — OXYCODONE HYDROCHLORIDE SCH MG: 20 TABLET, FILM COATED, EXTENDED RELEASE ORAL at 21:16

## 2017-06-07 RX ADMIN — INSULIN GLARGINE SCH UNIT: 100 INJECTION, SOLUTION SUBCUTANEOUS at 21:16

## 2017-06-07 RX ADMIN — NYSTATIN SCH APPLIC: 100000 POWDER TOPICAL at 05:45

## 2017-06-07 RX ADMIN — CEFEPIME HYDROCHLORIDE SCH MLS/HR: 2 INJECTION, POWDER, FOR SOLUTION INTRAVENOUS at 22:37

## 2017-06-07 RX ADMIN — HEPARIN SODIUM SCH UNITS: 5000 INJECTION INTRAVENOUS; SUBCUTANEOUS at 15:16

## 2017-06-07 RX ADMIN — Medication SCH CAP: at 11:30

## 2017-06-07 RX ADMIN — AMLODIPINE BESYLATE SCH MG: 5 TABLET ORAL at 21:16

## 2017-06-07 RX ADMIN — FUROSEMIDE SCH MG: 20 TABLET ORAL at 09:26

## 2017-06-07 RX ADMIN — Medication SCH: at 18:23

## 2017-06-07 RX ADMIN — ATORVASTATIN CALCIUM SCH MG: 80 TABLET, FILM COATED ORAL at 09:24

## 2017-06-07 RX ADMIN — INSULIN LISPRO SCH UNIT: 100 INJECTION, SOLUTION INTRAVENOUS; SUBCUTANEOUS at 09:27

## 2017-06-07 RX ADMIN — VANCOMYCIN HYDROCHLORIDE SCH MLS/HR: 1 INJECTION, POWDER, LYOPHILIZED, FOR SOLUTION INTRAVENOUS at 04:26

## 2017-06-07 RX ADMIN — HEPARIN SODIUM SCH UNITS: 5000 INJECTION INTRAVENOUS; SUBCUTANEOUS at 22:39

## 2017-06-07 RX ADMIN — OXYCODONE HYDROCHLORIDE SCH MG: 20 TABLET, FILM COATED, EXTENDED RELEASE ORAL at 09:24

## 2017-06-07 RX ADMIN — INSULIN LISPRO SCH UNIT: 100 INJECTION, SOLUTION INTRAVENOUS; SUBCUTANEOUS at 13:01

## 2017-06-07 RX ADMIN — DOCOSANOL SCH APPLIC: 100 CREAM TOPICAL at 11:38

## 2017-06-07 RX ADMIN — VANCOMYCIN HYDROCHLORIDE SCH MLS/HR: 1 INJECTION, POWDER, LYOPHILIZED, FOR SOLUTION INTRAVENOUS at 19:31

## 2017-06-07 RX ADMIN — DOCOSANOL SCH APPLIC: 100 CREAM TOPICAL at 15:19

## 2017-06-07 RX ADMIN — ATENOLOL SCH MG: 50 TABLET ORAL at 09:26

## 2017-06-07 NOTE — PN
Progress Note





- Progress Note


SOAP: 


Subjective:


DOS: 6/7/16


CC: leg infection


HPI: 61 year old man with lymphedema, chronic left heel ulcer s/p BKA.  Pain 

left foot/amp site.  Right foot blister, foot wrapped.  No fever, rash, or 

diarrhea.  








Objective:


[]


 











Temp Pulse Resp BP Pulse Ox


 


 36.8 C   101   16   128/64   94 


 


 06/07/17 07:29  06/07/17 07:29  06/07/17 09:24  06/07/17 07:29  06/07/17 09:00








Gen:Awake, no distress


Neuro:AAOx3


HEENT:PERRL, MMM


Neck:supple


Heart:RRR no murmur


Lungs:CTA BL


Abd:+BS NT ND soft


Skin: no rash


MSK:L BKA, wrapped; R foot trace edema and erythema , calcaneous and great toe 

small bullae; no wound


 Laboratory Results - last 24 hr











  06/06/17 06/06/17 06/06/17





  11:36 11:47 16:56


 


POC Glucose (mg/dL)  233 H   219 H


 


Vancomycin Trough   14.0 














  06/06/17 06/07/17





  21:25 08:39


 


POC Glucose (mg/dL)  313 H  186 H


 


Vancomycin Trough  








 Microbiology











 06/05/17 18:30 Anaerobic Culture - Preliminary





 Wound - Left Gram Stain - Final





 Wound Culture - Preliminary





    Serratia Marcescens

















Assessment:


1. Left foot chronic osteomyelitis and cellulitis s/p BKA


2. Left foot cellulitis 


3. diabetes, T2


4. obesity











Plan:


1. continue vancomycin, will add cefepime for left leg soft tissue infection 

and right foot infection, has midline, will eventually need PICC for long term 

antibiotics.  Continue leg elevation and diuresis.

## 2017-06-07 NOTE — PN
Subjective


Date of Service: 06/07/17


Interval History: 





Mr. Katz states that he is feeling relatively well.  He denies chest pain, SOB

, nausea, or abdominal pain.








Family History: Unchanged from Admission


Social History: Unchanged from Admission


Past Medical History: Unchanged from Admission





Objective


Active Medications: 





Acetaminophen (Tylenol Tab*)  650 mg PO Q4H PRN


Al Hydrox/Mg Hydrox/Simethicone (Maalox Plus*)  30 ml PO Q6H PRN


Amlodipine Besylate (Norvasc Tab*)  5 mg PO BEDTIME ERNST


Atenolol (Tenormin Tab*)  50 mg PO QAM ERNST


Atorvastatin Calcium (Lipitor*)  80 mg PO DAILY ERN[facility]


Calcium Carbonate (Tums*)  1,000 mg PO Q4H PRN


Cetirizine HCl (Zyrtec*)  10 mg PO DAILY [facility]


Dextrose (D50w Syringe 50 Ml*)  12.5 gm IV PUSH .FOR FS < 60 - SS PRN


Docosanol (Abreva 10%*)  1 applic TOPICAL FIVE TIMES DAILY [facility]


Furosemide (Lasix Tab*)  20 mg PO DAILY [facility]


Heparin Sodium (Porcine) (Heparin Vial(*))  5,000 units SUBCUT Q8HR ERNST


Heparin Sodium (Porcine) (Heparin Flush Picc/Ml/Cvc(*))  1 ml FLUSH 0600,1800 

ERNST


Hydromorphone HCl (Dilaudid Iv*)  2 mg IV SLOW PU Q4H PRN


Sodium Chloride (Ns 0.9% 500 Ml Bag*)  500 mls @ 0 mls/hr IV KVO ERNST


Hydromorphone HCl (Dilaudid Pca*)  20 mg in 20 mls @ 0 mls/hr PCA .change Q24H 

ERNST; Per Protocol


Cefepime HCl 2 gm/ Sodium (Chloride)  50 mls @ 100 mls/hr IVPB Q12H ERNST


Vancomycin HCl 1,250 mg/ (Sodium Chloride)  250 mls @ 166.667 mls/hr IVPB Q8H 

[facility]


Insulin Glargine (Lantus(*))  55 units SUBCUT BID ERNST


Insulin Human Lispro (Humalog*)  0 units SUBCUT AC ERNST


Lactobacillus Rhamnosus (Culturelle*)  1 cap PO BID ERN[facility]


Lisinopril (Prinivil Tab*)  30 mg PO DAILY [facility]


Lorazepam (Ativan Inj*)  1 mg IV PUSH Q6H PRN


Morphine Sulfate (Morphine Inj (Syringe)*)  4 mg IV Q2H PRN


Nystatin (Nystatin Top Powder*)  1 applic TOPICAL BID [facility]


Omeprazole (Prilosec Cap*)  20 mg PO DAILY@0730 [facility]


Ondansetron HCl (Zofran Inj*)  4 mg IV Q4H PRN


Oxycodone HCl (Roxycodone Tab*)  10 mg PO Q4H PRN


Oxycodone HCl (Oxycontin(*))  20 mg PO BID [facility]


Pharmacy Consult (Vancomycin Per Pharmacy*)  1 note FOLLOW UP . PRN





 Vital Signs











  06/06/17 06/06/17 06/06/17





  16:20 18:19 18:37


 


Temperature 98.5 F  


 


Pulse Rate 80  


 


Respiratory 20 16 16





Rate   


 


Blood Pressure 120/58  





(mmHg)   


 


O2 Sat by Pulse 98  98





Oximetry   














  06/06/17 06/06/17 06/06/17





  19:34 20:00 20:19


 


Temperature 98.7 F  


 


Pulse Rate 80  


 


Respiratory 20 20 22





Rate   


 


Blood Pressure 138/66  





(mmHg)   


 


O2 Sat by Pulse 99 95 





Oximetry   














  06/06/17 06/06/17 06/06/17





  20:45 21:13 22:00


 


Temperature   


 


Pulse Rate   


 


Respiratory 20 20 20





Rate   


 


Blood Pressure   





(mmHg)   


 


O2 Sat by Pulse   95





Oximetry   














  06/06/17 06/06/17 06/06/17





  22:26 23:13 23:26


 


Temperature   


 


Pulse Rate   


 


Respiratory 20 20 20





Rate   


 


Blood Pressure   





(mmHg)   


 


O2 Sat by Pulse   





Oximetry   














  06/06/17 06/07/17 06/07/17





  23:45 00:00 02:38


 


Temperature 99.1 F  


 


Pulse Rate 88  


 


Respiratory 22 20 18





Rate   


 


Blood Pressure 135/72  





(mmHg)   


 


O2 Sat by Pulse 97 95 98





Oximetry   














  06/07/17 06/07/17 06/07/17





  03:51 04:27 05:46


 


Temperature 98.8 F  


 


Pulse Rate 91  


 


Respiratory 20 20 20





Rate   


 


Blood Pressure 128/68  





(mmHg)   


 


O2 Sat by Pulse 96 98 





Oximetry   














  06/07/17 06/07/17 06/07/17





  07:03 07:29 07:46


 


Temperature  98.3 F 


 


Pulse Rate  101 


 


Respiratory 20 18 16





Rate   


 


Blood Pressure  128/64 





(mmHg)   


 


O2 Sat by Pulse 98 99 





Oximetry   














  06/07/17 06/07/17 06/07/17





  08:40 09:00 09:19


 


Temperature   


 


Pulse Rate   


 


Respiratory 16 16 18





Rate   


 


Blood Pressure   





(mmHg)   


 


O2 Sat by Pulse 99 94 





Oximetry   














  06/07/17 06/07/17 06/07/17





  09:24 10:19 11:00


 


Temperature   


 


Pulse Rate   


 


Respiratory 16 18 18





Rate   


 


Blood Pressure   





(mmHg)   


 


O2 Sat by Pulse   96





Oximetry   














  06/07/17 06/07/17 06/07/17





  11:24 11:42 13:00


 


Temperature  98.5 F 


 


Pulse Rate  73 


 


Respiratory 16 18 18





Rate   


 


Blood Pressure  136/50 





(mmHg)   


 


O2 Sat by Pulse  97 98





Oximetry   











Oxygen Devices in Use Now: None


Appearance: Male sitting up in chair in NAD


Eyes: No Scleral Icterus


Ears/Nose/Mouth/Throat: Mucous Membranes Moist


Neck: Trachea Midline


Respiratory: Symmetrical Chest Expansion and Respiratory Effort, Clear to 

Auscultation


Cardiovascular: NL Sounds; No Murmurs; No JVD


Abdominal: NL Sounds; No Tenderness; No Distention


Lymphatic: No Cervical Adenopathy


Skin: - - R BKA stump dressing CDI


Nutrition: Taking PO's


Result Diagrams: 


 06/06/17 06:05





 06/06/17 06:05


Microbiology and Other Data: 


 Microbiology











 05/31/17 07:54 Aerobic Blood Culture - Preliminary





 Blood Venous    No Growth Day 1





 Anaerobic Blood Culture - Preliminary





    No Growth Day 1


 


 05/30/17 23:34 Aerobic Blood Culture - Preliminary





 Blood Venous    No Growth Day 1





 Anaerobic Blood Culture - Preliminary





    No Growth Day 1











Diagnostic Imaging: 





Echo 5/8/17 - LVEF 55-60%, mod LVH, diastolic dysfunction, mild to mod reduced 

RV function, unable to assess RV function





MRI L ankle - osteomyelitis of the L calcaneus that looks to have significantly 

advanced when compared to 3/2017





MRI R foot - Moderate degree of subcutaneous edema. No evidence of bone marrow 

edema or replacement to suggest osteomyelitis is present.











Assess/Plan/Problems-Billing


Assessment: 





Mr. Katz is a 62 yo gentleman with known osteomyelitis of L calcaneus, poorly 

controlled diabetes, chronic RV failure and diastolic dysfunction with 

significant LE edema as well as untreated DAVID, HTN, COPD, morbid obesity and 

chronic pain who was referred to the hospital by Dr Kuhn.





 





- Patient Problems


(1) Cellulitis


Comment: Now s/p left BKA.  Continue Vanco and cefepime per ID recommendation. 

  





(2) COPD (chronic obstructive pulmonary disease)


Comment: No acute exacerbation   





(3) Chronic heart failure


Comment: Asymptomatic.  Echo from earlier this month shows RV failure and 

diastolic dysfunction which appears to be chronic in nature.  RV pressure 

unable to be measured, but assumed he has pulm HTN from untreated DAVID.  

Attempted CPAP, but still unable to tolerate.  Switch to oral lasix, stop IV 

fluids.   





(4) DAVID (obstructive sleep apnea)


Comment: Non-compliant with CPAP at home.  Attempted CPAP here but pt unable to 

tolerate.   





(5) Diabetes


Comment: IDDM.  HgbA1c 9.7%.   this AM.  Continue lantus with mealtime 

Humalog coverage.   





(6) Hyperlipidemia


Comment: Continue statin   





(7) Morbid obesity


Comment: BMI 50.  Bariatric surgery within the last 12 months with 65# weight 

loss   





(8) Chronic pain


Comment: Chronic back, knee, shoulder, and leg pain.  Prescribed Suboxone by Dr Wilder, He had incomplete pain relief from Suboxone, which has been stopped.  

Continue oxycontin with prn oxycodone.  Referral to pain management at 

discharge   





(9) HTN (hypertension)


Comment: BP well controlled. Continue lisinopril, atenolol.   





(10) Lymphedema


Comment: Chronic,  Likely related to RV and diastolic failure, Continue 

compression and diuresis.   





(11) DVT prophylaxis


Comment: HSQ   





(12) Full code status





Status and Disposition: 





Inpatient.  Pt will likely need rehab.

## 2017-06-07 NOTE — PN
Progress Note





- Progress Note


SOAP: 


Subjective:


[]Patient working with PT and about ready to be Yonis lifted from bed to chair.

  Pain levels are still moderate to severe, but only when moved.  At rest pain 

is tolerable.  His PCA was adjusted yesterday due to excessive somnolence.  

This has improved. 








Objective:


[]


 Vital Signs











Temp  98.3 F   06/07/17 07:29


 


Pulse  101   06/07/17 07:29


 


Resp  16   06/07/17 09:24


 


BP  128/64   06/07/17 07:29


 


Pulse Ox  94   06/07/17 09:00








 Intake & Output











 06/06/17 06/07/17 06/07/17





 18:59 06:59 18:59


 


Intake Total 1275 4130 


 


Output Total 1450 700 


 


Balance -175 3430 


 


Weight 350 lb 11.2 oz 340 lb 8 oz 


 


Intake:   


 


  IV Fluids 985 1710 


 


    ABX - VANCOMYCIN  1110 


 


      


 


    NS (0.9%)  600 


 


  Oral 290 2420 


 


Output:   


 


  Urine 1450 700 


 


Other:   


 


  Estimated Void Medium Large 


 


  # Voids 1 1 








 Laboratory Results - last 24 hr











  06/06/17 06/06/17 06/06/17





  11:36 11:47 16:56


 


POC Glucose (mg/dL)  233 H   219 H


 


Vancomycin Trough   14.0 














  06/06/17 06/07/17





  21:25 08:39


 


POC Glucose (mg/dL)  313 H  186 H


 


Vancomycin Trough  








 Microbiology











 06/05/17 18:30 Anaerobic Culture - Preliminary





 Wound - Left Gram Stain - Final





 Wound Culture - Preliminary





    Serratia Marcescens


 


 05/31/17 07:54 Aerobic Blood Culture - Final





 Blood Venous    No Growth Day 5





 Anaerobic Blood Culture - Final





    No Growth Day 5





 Blood Culture - Final


 


 05/30/17 23:34 Aerobic Blood Culture - Final





 Blood Venous    No Growth Day 5





 Anaerobic Blood Culture - Final





    No Growth Day 5





 Blood Culture - Final








Left LE stump dressing remains dry and intact


Right toe blister dried and looks improved, overall LE ankle/foot improving 

with minimal drainage on dressings








Assessment:


[]s/p Left BKA for chronic osteomyelitis- wound culture grew S. marcescens


   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 








Plan:


[]Currently on Vancomycin, additional recommendations per Dr. Mejia based on 

wound culture


  Continue daily dressing changes to RLE


  Padded boot and elevation of  right heel off of bed to prevent heel breakdown


  Continue current pain management


  Rehab when stable for discharge

## 2017-06-08 RX ADMIN — OXYCODONE HYDROCHLORIDE SCH MG: 20 TABLET, FILM COATED, EXTENDED RELEASE ORAL at 08:30

## 2017-06-08 RX ADMIN — OXYCODONE HYDROCHLORIDE PRN MG: 5 CAPSULE ORAL at 13:31

## 2017-06-08 RX ADMIN — DOCOSANOL SCH APPLIC: 100 CREAM TOPICAL at 13:33

## 2017-06-08 RX ADMIN — Medication SCH CAP: at 21:01

## 2017-06-08 RX ADMIN — HEPARIN SODIUM SCH UNITS: 5000 INJECTION INTRAVENOUS; SUBCUTANEOUS at 05:48

## 2017-06-08 RX ADMIN — CETIRIZINE HYDROCHLORIDE SCH MG: 10 TABLET, FILM COATED ORAL at 08:31

## 2017-06-08 RX ADMIN — HEPARIN SODIUM SCH UNITS: 5000 INJECTION INTRAVENOUS; SUBCUTANEOUS at 21:01

## 2017-06-08 RX ADMIN — ATENOLOL SCH MG: 50 TABLET ORAL at 08:31

## 2017-06-08 RX ADMIN — INSULIN LISPRO SCH UNIT: 100 INJECTION, SOLUTION INTRAVENOUS; SUBCUTANEOUS at 17:25

## 2017-06-08 RX ADMIN — Medication SCH: at 17:19

## 2017-06-08 RX ADMIN — OXYCODONE HYDROCHLORIDE PRN MG: 5 CAPSULE ORAL at 21:33

## 2017-06-08 RX ADMIN — CEFEPIME HYDROCHLORIDE SCH MLS/HR: 2 INJECTION, POWDER, FOR SOLUTION INTRAVENOUS at 22:59

## 2017-06-08 RX ADMIN — FUROSEMIDE SCH MG: 20 TABLET ORAL at 08:31

## 2017-06-08 RX ADMIN — OXYCODONE HYDROCHLORIDE SCH MG: 10 TABLET, FILM COATED, EXTENDED RELEASE ORAL at 21:00

## 2017-06-08 RX ADMIN — INSULIN GLARGINE SCH UNIT: 100 INJECTION, SOLUTION SUBCUTANEOUS at 08:28

## 2017-06-08 RX ADMIN — INSULIN LISPRO SCH UNIT: 100 INJECTION, SOLUTION INTRAVENOUS; SUBCUTANEOUS at 08:28

## 2017-06-08 RX ADMIN — LISINOPRIL SCH MG: 10 TABLET ORAL at 08:31

## 2017-06-08 RX ADMIN — VANCOMYCIN HYDROCHLORIDE SCH MLS/HR: 1 INJECTION, POWDER, LYOPHILIZED, FOR SOLUTION INTRAVENOUS at 20:13

## 2017-06-08 RX ADMIN — DOCOSANOL SCH APPLIC: 100 CREAM TOPICAL at 17:26

## 2017-06-08 RX ADMIN — VANCOMYCIN HYDROCHLORIDE SCH MLS/HR: 1 INJECTION, POWDER, LYOPHILIZED, FOR SOLUTION INTRAVENOUS at 11:44

## 2017-06-08 RX ADMIN — OXYCODONE HYDROCHLORIDE PRN MG: 5 CAPSULE ORAL at 17:32

## 2017-06-08 RX ADMIN — HYDROMORPHONE HYDROCHLORIDE PRN MG: 1 INJECTION, SOLUTION INTRAMUSCULAR; INTRAVENOUS; SUBCUTANEOUS at 21:33

## 2017-06-08 RX ADMIN — DOCOSANOL SCH APPLIC: 100 CREAM TOPICAL at 05:48

## 2017-06-08 RX ADMIN — OMEPRAZOLE SCH MG: 20 CAPSULE, DELAYED RELEASE ORAL at 07:56

## 2017-06-08 RX ADMIN — CEFEPIME HYDROCHLORIDE SCH MLS/HR: 2 INJECTION, POWDER, FOR SOLUTION INTRAVENOUS at 11:02

## 2017-06-08 RX ADMIN — INSULIN LISPRO SCH UNIT: 100 INJECTION, SOLUTION INTRAVENOUS; SUBCUTANEOUS at 12:18

## 2017-06-08 RX ADMIN — NYSTATIN SCH APPLIC: 100000 POWDER TOPICAL at 10:07

## 2017-06-08 RX ADMIN — DOCOSANOL SCH APPLIC: 100 CREAM TOPICAL at 10:06

## 2017-06-08 RX ADMIN — HEPARIN SODIUM SCH UNITS: 5000 INJECTION INTRAVENOUS; SUBCUTANEOUS at 13:31

## 2017-06-08 RX ADMIN — NYSTATIN SCH APPLIC: 100000 POWDER TOPICAL at 21:02

## 2017-06-08 RX ADMIN — Medication SCH ML: at 05:48

## 2017-06-08 RX ADMIN — DOCOSANOL SCH APPLIC: 100 CREAM TOPICAL at 21:01

## 2017-06-08 RX ADMIN — OXYCODONE HYDROCHLORIDE PRN MG: 5 CAPSULE ORAL at 00:24

## 2017-06-08 RX ADMIN — Medication SCH CAP: at 08:31

## 2017-06-08 RX ADMIN — ACETAMINOPHEN PRN MG: 325 TABLET ORAL at 21:33

## 2017-06-08 RX ADMIN — INSULIN GLARGINE SCH UNIT: 100 INJECTION, SOLUTION SUBCUTANEOUS at 21:02

## 2017-06-08 RX ADMIN — Medication SCH ML: at 16:06

## 2017-06-08 RX ADMIN — VANCOMYCIN HYDROCHLORIDE SCH MLS/HR: 1 INJECTION, POWDER, LYOPHILIZED, FOR SOLUTION INTRAVENOUS at 04:15

## 2017-06-08 RX ADMIN — ATORVASTATIN CALCIUM SCH MG: 80 TABLET, FILM COATED ORAL at 08:31

## 2017-06-08 RX ADMIN — AMLODIPINE BESYLATE SCH MG: 5 TABLET ORAL at 21:01

## 2017-06-08 RX ADMIN — OXYCODONE HYDROCHLORIDE PRN MG: 5 CAPSULE ORAL at 07:56

## 2017-06-08 NOTE — PN
Progress Note





- Progress Note


SOAP: 


Subjective:


[]Patient was seen at bedside.  Pic line just placed.  Overall pain a little 

better in the amputation site.  








Objective:


[]


 Vital Signs











Temp  98.1 F   06/08/17 07:45


 


Pulse  86   06/08/17 07:45


 


Resp  18   06/08/17 08:30


 


BP  112/58   06/08/17 07:45


 


Pulse Ox  95   06/08/17 08:00








 Intake & Output











 06/07/17 06/08/17 06/08/17





 18:59 06:59 18:59


 


Intake Total 370 3273 320


 


Output Total 350 750 


 


Balance 20 2523 320


 


Intake:   


 


  IV Fluids  1323 


 


    ABX - VANCOMYCIN  854 


 


    NS (0.9%)  469 


 


  Oral 370 1950 320


 


Output:   


 


  Urine 350 750 


 


Other:   


 


  Estimated Void Medium  


 


  Date of Last Bowel  6/8/17 





  Movement   


 


  # Bowel Movements  2 


 


  Estimated Stool Amount  Large 


 


  # Voids 1  








 Laboratory Results - last 24 hr











  06/07/17 06/07/17 06/07/17





  12:24 18:04 21:23


 


POC Glucose (mg/dL)  327 H  245 H  245 H














  06/08/17





  07:36


 


POC Glucose (mg/dL)  192 H








 Microbiology











 06/05/17 18:30 Anaerobic Culture - Preliminary





 Wound - Left Gram Stain - Final





 Wound Culture - Preliminary





    Serratia Marcescens





    Staphylococcus Lugdenensis


 


 05/31/17 07:54 Aerobic Blood Culture - Final





 Blood Venous    No Growth Day 5





 Anaerobic Blood Culture - Final





    No Growth Day 5





 Blood Culture - Final


 


 05/30/17 23:34 Aerobic Blood Culture - Final





 Blood Venous    No Growth Day 5





 Anaerobic Blood Culture - Final





    No Growth Day 5





 Blood Culture - Final








Left LE stump dressing/splint remains dry and intact


Right foot padded boot donned, skin continues to improve, toe ulcer healing and 

dry











Assessment:


[]s/p BKA LLE for chronic osteo calcaneus POD#3


  Right toe ulcer, weeping lymphadema, much improved








Plan:


[]Continue IV ABX per Dr. Mejia, Cefepime added 6/7 with the Vanco


   [facility] for transfers as needed


   Discharge when medically stable

## 2017-06-08 NOTE — PN
Subjective


Date of Service: 06/08/17


Interval History: 





Mr. Katz complains of generalized pain but denies other complaint including 

chest pain, SOB, nausea, or abdominal pain.








Family History: Unchanged from Admission


Social History: Unchanged from Admission


Past Medical History: Unchanged from Admission





Objective


Active Medications: 





Acetaminophen (Tylenol Tab*)  650 mg PO Q4H PRN


Al Hydrox/Mg Hydrox/Simethicone (Maalox Plus*)  30 ml PO Q6H PRN


Amlodipine Besylate (Norvasc Tab*)  5 mg PO BEDTIME ERNST


Atenolol (Tenormin Tab*)  50 mg PO QAM ERNST


Atorvastatin Calcium (Lipitor*)  80 mg PO DAILY [facility]


Calcium Carbonate (Tums*)  1,000 mg PO Q4H PRN


Cetirizine HCl (Zyrtec*)  10 mg PO DAILY [facility]


Dextrose (D50w Syringe 50 Ml*)  12.5 gm IV PUSH .FOR FS < 60 - SS PRN


Docosanol (Abreva 10%*)  1 applic TOPICAL FIVE TIMES DAILY [facility]


Furosemide (Lasix Tab*)  20 mg PO DAILY [facility]


Heparin Sodium (Porcine) (Heparin Vial(*))  5,000 units SUBCUT Q8HR ERNST


Heparin Sodium (Porcine) (Heparin Flush Picc/Ml/Cvc(*))  1 ml FLUSH 0600,1800 

[facility]


Heparin Sodium (Porcine) (Heparin Flush Picc/Ml/Cvc(*))  1 - 3 ml FLUSH 0600,

1800 [facility]


Hydromorphone HCl (Dilaudid Iv*)  1 mg IV SLOW PU Q4H PRN


Sodium Chloride (Ns 0.9% 500 Ml Bag*)  500 mls @ 0 mls/hr IV KVO ERNST


Cefepime HCl 2 gm/ Sodium (Chloride)  50 mls @ 100 mls/hr IVPB Q12H ERNST


Vancomycin HCl 1,250 mg/ (Sodium Chloride)  250 mls @ 166.667 mls/hr IVPB Q8H 

[facility]


Insulin Glargine (Lantus(*))  55 units SUBCUT BID [facility]


Insulin Human Lispro (Humalog*)  0 units SUBCUT AC [facility]


Lactobacillus Rhamnosus (Culturelle*)  1 cap PO BID [facility]


Lisinopril (Prinivil Tab*)  30 mg PO DAILY [facility]


Lorazepam (Ativan Inj*)  1 mg IV PUSH Q6H PRN


Nystatin (Nystatin Top Powder*)  1 applic TOPICAL BID [facility]


Omeprazole (Prilosec Cap*)  20 mg PO DAILY@0730 [facility]


Ondansetron HCl (Zofran Inj*)  4 mg IV Q4H PRN


Oxycodone HCl (Roxycodone Tab*)  10 mg PO Q4H PRN


Oxycodone HCl (Oxycontin(*))  30 mg PO 0900,2100 [facility]


Pharmacy Consult (Vancomycin Per Pharmacy*)  1 note FOLLOW UP . PRN


Pharmacy Profile Note (Vancomycin Trough Check)  1 note FOLLOW UP ONCE ONE





 Vital Signs











  06/07/17 06/07/17 06/07/17





  15:34 16:00 17:00


 


Temperature 99.9 F  


 


Pulse Rate 73  


 


Respiratory 18  18





Rate   


 


Blood Pressure 120/55  





(mmHg)   


 


O2 Sat by Pulse 97 98 98





Oximetry   














  06/07/17 06/07/17 06/07/17





  18:55 19:00 19:44


 


Temperature   101.0 F


 


Pulse Rate   79


 


Respiratory 17 16 16





Rate   


 


Blood Pressure   124/58





(mmHg)   


 


O2 Sat by Pulse  98 92





Oximetry   














  06/07/17 06/07/17 06/07/17





  21:00 21:16 22:50


 


Temperature   


 


Pulse Rate   


 


Respiratory 15 16 16





Rate   


 


Blood Pressure   





(mmHg)   


 


O2 Sat by Pulse 98  





Oximetry   














  06/07/17 06/07/17 06/08/17





  23:00 23:21 00:24


 


Temperature  98.5 F 


 


Pulse Rate  76 


 


Respiratory 15 16 19





Rate   


 


Blood Pressure  115/61 





(mmHg)   


 


O2 Sat by Pulse 96 97 





Oximetry   














  06/08/17 06/08/17 06/08/17





  01:00 02:07 03:00


 


Temperature   


 


Pulse Rate   


 


Respiratory 14 14 14





Rate   


 


Blood Pressure   





(mmHg)   


 


O2 Sat by Pulse 98  97





Oximetry   














  06/08/17 06/08/17 06/08/17





  03:34 05:00 06:41


 


Temperature 98.5 F  


 


Pulse Rate 78  92


 


Respiratory 16 14 





Rate   


 


Blood Pressure 112/62  





(mmHg)   


 


O2 Sat by Pulse 94 97 92





Oximetry   














  06/08/17 06/08/17 06/08/17





  07:00 07:45 07:56


 


Temperature  98.1 F 


 


Pulse Rate  86 


 


Respiratory 15 20 18





Rate   


 


Blood Pressure  112/58 





(mmHg)   


 


O2 Sat by Pulse 97 95 





Oximetry   














  06/08/17 06/08/17 06/08/17





  08:00 08:30 09:00


 


Temperature   


 


Pulse Rate   


 


Respiratory 16 18 18





Rate   


 


Blood Pressure   





(mmHg)   


 


O2 Sat by Pulse 95  96





Oximetry   














  06/08/17 06/08/17 06/08/17





  09:56 10:30 11:00


 


Temperature   


 


Pulse Rate   


 


Respiratory 18 18 20





Rate   


 


Blood Pressure   





(mmHg)   


 


O2 Sat by Pulse   94





Oximetry   














  06/08/17 06/08/17 06/08/17





  11:26 12:47 13:31


 


Temperature 98.1 F  


 


Pulse Rate 80 76 


 


Respiratory 16  18





Rate   


 


Blood Pressure 113/58  





(mmHg)   


 


O2 Sat by Pulse 96 96 





Oximetry   











Oxygen Devices in Use Now: None


Appearance: Male sitting up in bed in NAD


Eyes: No Scleral Icterus


Ears/Nose/Mouth/Throat: Mucous Membranes Moist


Neck: NL Appearance and Movements; NL JVP


Respiratory: Symmetrical Chest Expansion and Respiratory Effort, Clear to 

Auscultation


Cardiovascular: NL Sounds; No Murmurs; No JVD, No Edema


Abdominal: NL Sounds; No Tenderness; No Distention


Lymphatic: No Cervical Adenopathy


Extremities: No Edema


Skin: - - Left BKA dressing CDI, ulceration noted to 


Neurological: Alert and Oriented x 3, NL Muscle Strength and Tone


Nutrition: Taking PO's


Result Diagrams: 


 06/06/17 06:05





 06/06/17 06:05


Microbiology and Other Data: 


 Microbiology











 05/31/17 07:54 Aerobic Blood Culture - Preliminary





 Blood Venous    No Growth Day 1





 Anaerobic Blood Culture - Preliminary





    No Growth Day 1


 


 05/30/17 23:34 Aerobic Blood Culture - Preliminary





 Blood Venous    No Growth Day 1





 Anaerobic Blood Culture - Preliminary





    No Growth Day 1











Diagnostic Imaging: 





Echo 5/8/17 - LVEF 55-60%, mod LVH, diastolic dysfunction, mild to mod reduced 

RV function, unable to assess RV function





MRI L ankle - osteomyelitis of the L calcaneus that looks to have significantly 

advanced when compared to 3/2017





MRI R foot - Moderate degree of subcutaneous edema. No evidence of bone marrow 

edema or replacement to suggest osteomyelitis is present.











Assess/Plan/Problems-Billing


Assessment: 





Mr. Katz is a 60 yo gentleman with known osteomyelitis of L calcaneus, poorly 

controlled diabetes, chronic RV failure and diastolic dysfunction with 

significant LE edema as well as untreated DAVID, HTN, COPD, morbid obesity and 

chronic pain who was referred to the hospital by Dr Kuhn.





 





- Patient Problems


(1) Cellulitis


Comment: Now s/p left BKA.  Also has blister to R great toe with erythema.  

Continue Vanco and cefepime per ID recommendation.  PICC line placed.  D/C PCA.

     





(2) Chronic pain


Comment: Chronic back, knee, shoulder, and leg pain.  Was let go from Dr. Taylor'

s practice due to improper use of pain meds.  Prescribed Suboxone by Dr Wilder 

outpatient.  D/C PCA and increase oxycontin with prn oxycodone.  Dr. Reno 

consulted for management recommendations.   





(3) COPD (chronic obstructive pulmonary disease)


Comment: No acute exacerbation   





(4) Chronic heart failure


Comment: Asymptomatic.  Echo from earlier this month shows RV failure and 

diastolic dysfunction which appears to be chronic in nature.  RV pressure 

unable to be measured, but assumed he has pulm HTN from untreated DAVID.  

Attempted CPAP, but still unable to tolerate.  Continue lasix.   





(5) DAVID (obstructive sleep apnea)


Comment: Non-compliant with CPAP at home.  Attempted CPAP here but pt unable to 

tolerate.   





(6) Diabetes


Comment: IDDM.  HgbA1c 9.7%.   this AM.  Continue lantus with mealtime 

Humalog coverage.   





(7) Hyperlipidemia


Comment: Continue statin   





(8) Morbid obesity


Comment: BMI 50.  Bariatric surgery within the last 12 months with 65# weight 

loss   





(9) HTN (hypertension)


Comment: BP well controlled. Continue lisinopril, atenolol.   





(10) Lymphedema


Comment: Chronic,  Likely related to RV and diastolic failure, Continue 

compression and diuresis.   





(11) DVT prophylaxis


Comment: HSQ   





(12) Full code status





Status and Disposition: 





Inpatient.  Pt will likely need rehab.

## 2017-06-09 RX ADMIN — INSULIN LISPRO SCH UNIT: 100 INJECTION, SOLUTION INTRAVENOUS; SUBCUTANEOUS at 18:01

## 2017-06-09 RX ADMIN — ATORVASTATIN CALCIUM SCH MG: 80 TABLET, FILM COATED ORAL at 09:41

## 2017-06-09 RX ADMIN — AMLODIPINE BESYLATE SCH MG: 5 TABLET ORAL at 20:53

## 2017-06-09 RX ADMIN — DOCOSANOL SCH: 100 CREAM TOPICAL at 12:28

## 2017-06-09 RX ADMIN — OXYCODONE HYDROCHLORIDE PRN MG: 5 CAPSULE ORAL at 03:52

## 2017-06-09 RX ADMIN — Medication SCH CAP: at 20:54

## 2017-06-09 RX ADMIN — HEPARIN SODIUM SCH UNITS: 5000 INJECTION INTRAVENOUS; SUBCUTANEOUS at 21:00

## 2017-06-09 RX ADMIN — DOCOSANOL SCH: 100 CREAM TOPICAL at 21:00

## 2017-06-09 RX ADMIN — VANCOMYCIN HYDROCHLORIDE SCH MLS/HR: 1 INJECTION, POWDER, LYOPHILIZED, FOR SOLUTION INTRAVENOUS at 12:27

## 2017-06-09 RX ADMIN — OXYCODONE HYDROCHLORIDE PRN MG: 5 CAPSULE ORAL at 22:18

## 2017-06-09 RX ADMIN — OXYCODONE HYDROCHLORIDE SCH MG: 10 TABLET, FILM COATED, EXTENDED RELEASE ORAL at 20:53

## 2017-06-09 RX ADMIN — VANCOMYCIN HYDROCHLORIDE SCH MLS/HR: 1 INJECTION, POWDER, LYOPHILIZED, FOR SOLUTION INTRAVENOUS at 20:00

## 2017-06-09 RX ADMIN — CETIRIZINE HYDROCHLORIDE SCH MG: 10 TABLET, FILM COATED ORAL at 09:43

## 2017-06-09 RX ADMIN — DOCOSANOL SCH: 100 CREAM TOPICAL at 05:57

## 2017-06-09 RX ADMIN — VANCOMYCIN HYDROCHLORIDE SCH MLS/HR: 1 INJECTION, POWDER, LYOPHILIZED, FOR SOLUTION INTRAVENOUS at 03:52

## 2017-06-09 RX ADMIN — HEPARIN SODIUM SCH UNITS: 5000 INJECTION INTRAVENOUS; SUBCUTANEOUS at 05:51

## 2017-06-09 RX ADMIN — NYSTATIN SCH APPLIC: 100000 POWDER TOPICAL at 09:51

## 2017-06-09 RX ADMIN — Medication SCH: at 16:03

## 2017-06-09 RX ADMIN — LISINOPRIL SCH MG: 10 TABLET ORAL at 09:41

## 2017-06-09 RX ADMIN — OXYCODONE HYDROCHLORIDE SCH MG: 10 TABLET, FILM COATED, EXTENDED RELEASE ORAL at 09:41

## 2017-06-09 RX ADMIN — INSULIN GLARGINE SCH UNIT: 100 INJECTION, SOLUTION SUBCUTANEOUS at 20:55

## 2017-06-09 RX ADMIN — ACETAMINOPHEN PRN MG: 325 TABLET ORAL at 05:50

## 2017-06-09 RX ADMIN — OXYCODONE HYDROCHLORIDE PRN MG: 5 CAPSULE ORAL at 18:25

## 2017-06-09 RX ADMIN — OMEPRAZOLE SCH MG: 20 CAPSULE, DELAYED RELEASE ORAL at 07:59

## 2017-06-09 RX ADMIN — OXYCODONE HYDROCHLORIDE PRN MG: 5 CAPSULE ORAL at 07:58

## 2017-06-09 RX ADMIN — INSULIN LISPRO SCH UNIT: 100 INJECTION, SOLUTION INTRAVENOUS; SUBCUTANEOUS at 13:23

## 2017-06-09 RX ADMIN — Medication SCH CAP: at 09:41

## 2017-06-09 RX ADMIN — OXYCODONE HYDROCHLORIDE PRN MG: 5 CAPSULE ORAL at 13:23

## 2017-06-09 RX ADMIN — CEFEPIME HYDROCHLORIDE SCH MLS/HR: 2 INJECTION, POWDER, FOR SOLUTION INTRAVENOUS at 22:32

## 2017-06-09 RX ADMIN — INSULIN GLARGINE SCH UNIT: 100 INJECTION, SOLUTION SUBCUTANEOUS at 09:44

## 2017-06-09 RX ADMIN — HEPARIN SODIUM SCH UNITS: 5000 INJECTION INTRAVENOUS; SUBCUTANEOUS at 13:24

## 2017-06-09 RX ADMIN — NYSTATIN SCH APPLIC: 100000 POWDER TOPICAL at 21:35

## 2017-06-09 RX ADMIN — FUROSEMIDE SCH MG: 20 TABLET ORAL at 09:41

## 2017-06-09 RX ADMIN — CEFEPIME HYDROCHLORIDE SCH MLS/HR: 2 INJECTION, POWDER, FOR SOLUTION INTRAVENOUS at 11:48

## 2017-06-09 RX ADMIN — HYDROMORPHONE HYDROCHLORIDE PRN MG: 1 INJECTION, SOLUTION INTRAMUSCULAR; INTRAVENOUS; SUBCUTANEOUS at 06:32

## 2017-06-09 RX ADMIN — INSULIN LISPRO SCH UNIT: 100 INJECTION, SOLUTION INTRAVENOUS; SUBCUTANEOUS at 09:43

## 2017-06-09 RX ADMIN — Medication SCH ML: at 05:51

## 2017-06-09 RX ADMIN — Medication SCH ML: at 16:56

## 2017-06-09 RX ADMIN — Medication SCH: at 05:57

## 2017-06-09 RX ADMIN — ATENOLOL SCH MG: 50 TABLET ORAL at 09:41

## 2017-06-09 RX ADMIN — DOCOSANOL SCH: 100 CREAM TOPICAL at 16:02

## 2017-06-09 RX ADMIN — DOCOSANOL SCH: 100 CREAM TOPICAL at 09:55

## 2017-06-09 NOTE — PN
Progress Note





- Progress Note


SOAP: 


Subjective:


60 y/o male s/p L BKA 6/5 by Dr. Kuhn.  Patient resting in chair, c/o fatigue

, falls asleep quickly, difficult to arouse.  A&O to person, place.   VSS 

overnight 








Objective:


General-  Lethargic, AO, rousable.  SItting in chair comfortably 


MSK-  Surgical dressing intact over L LE, no drainage noted, no erythema/ 

lymphatic streaking noted L thigh.   R leg dressing intact, minimal weeping 

from 2nd R toe, no odor noted, dressing dry/ intact over R ankle, decreased 

erythema, edema from prior examinations.  + tender to touch R LE.   





 Vital Signs











Temp  98.4 F   06/09/17 07:43


 


Pulse  89   06/09/17 07:43


 


Resp  16   06/09/17 09:58


 


BP  118/69   06/09/17 07:43


 


Pulse Ox  93   06/09/17 07:43








 Intake & Output











 06/08/17 06/09/17 06/09/17





 18:59 06:59 18:59


 


Intake Total 1747 2330 0


 


Output Total 400 900 350


 


Balance 1347 1430 -350


 


Weight 345 lb 4.8 oz  


 


Intake:   


 


  IV Fluids 947  


 


    ABX - VANCOMYCIN 539  


 


    NS (0.9%) 348  


 


    cefepime 60  


 


  IVPB  590 


 


    ABX - VANCOMYCIN  530 


 


    cefepime  60 


 


  Oral 800 1740 0


 


Output:   


 


  Urine 400 900 350


 


Other:   


 


  Estimated Void Large Medium 








 Laboratory Results - last 24 hr











  06/08/17 06/08/17 06/08/17





  11:50 17:10 20:46


 


POC Glucose (mg/dL)  342 H  272 H  297 H














  06/09/17





  07:58


 


POC Glucose (mg/dL)  215 H

















Assessment:


60 y/o male s/p L BKA 6/5 by Dr. Kuhn. 








Plan:


- Leucocytosis- likely post-op continue to monitor 


- PICC placed, IV ABX per Dr. Mejia 


- F/U with Dr. Kuhn within 10-14 days post-op 


- Likely rehab placement 


- Continue PT/ OT 


- Decrease pain medication, spoke with hosp.  


- Continue dressing changes 








 Active Medications











Generic Name Dose Route Start Last Admin





  Trade Name Freq  PRN Reason Stop Dose Admin


 


Acetaminophen  650 mg  05/30/17 13:29  06/09/17 05:50





  Tylenol Tab*  PO   650 mg





  Q4H PRN   Administration





  FEVER/PAIN   


 


Al Hydrox/Mg Hydrox/Simethicone  30 ml  06/04/17 17:45  





  Maalox Plus*  PO   





  Q6H PRN   





  INDIGESTION   


 


Amlodipine Besylate  5 mg  05/30/17 21:00  06/08/17 21:01





  Norvasc Tab*  PO   5 mg





  BEDTIME ERNST   Administration


 


Atenolol  50 mg  05/31/17 09:00  06/09/17 09:41





  Tenormin Tab*  PO   50 mg





  QAM ERNST   Administration


 


Atorvastatin Calcium  80 mg  05/31/17 09:00  06/09/17 09:41





  Lipitor*  PO   80 mg





  DAILY ERNST   Administration


 


Calcium Carbonate  1,000 mg  05/30/17 16:56  06/04/17 05:16





  Tums*  PO   1,000 mg





  Q4H PRN   Administration





  reflux   


 


Cetirizine HCl  10 mg  06/03/17 18:00  06/09/17 09:43





  Zyrtec*  PO   10 mg





  DAILY ERNST   Administration





  Protocol   


 


Dextrose  12.5 gm  05/30/17 15:02  





  D50w Syringe 50 Ml*  IV PUSH   





  .FOR FS < 60 - SS PRN   





  FS < 60   


 


Docosanol  1 applic  06/03/17 18:00  06/09/17 09:55





  Abreva 10%*  TOPICAL   Not Given





  FIVE TIMES DAILY [facility]   


 


Furosemide  20 mg  06/07/17 09:00  06/09/17 09:41





  Lasix Tab*  PO   20 mg





  DAILY ERNST   Administration


 


Heparin Sodium (Porcine)  5,000 units  05/30/17 14:00  06/09/17 05:51





  Heparin Vial(*)  SUBCUT   5,000 units





  Q8HR ERNST   Administration


 


Heparin Sodium (Porcine)  1 ml  06/02/17 18:00  06/09/17 05:51





  Heparin Flush Picc/Ml/Cvc(*)  FLUSH   1 ml





  0600,1800 [facility]   Administration





  Protocol   


 


Heparin Sodium (Porcine)  1 - 3 ml  06/08/17 18:00  06/09/17 05:57





  Heparin Flush Picc/Ml/Cvc(*)  FLUSH   Not Given





  0600,1800 [facility]   





  Protocol   


 


Hydromorphone HCl  1 mg  06/08/17 15:30  06/09/17 06:32





  Dilaudid Iv*  IV SLOW PU   1 mg





  Q4H PRN   Administration





  PAIN   


 


Sodium Chloride  500 mls @ 0 mls/hr  06/06/17 13:00  





  Ns 0.9% 500 Ml Bag*  IV   





  KVO ERNST   





  KVO   


 


Cefepime HCl 2 gm/ Sodium  50 mls @ 100 mls/hr  06/07/17 11:00  06/08/17 22:59





  Chloride  IVPB   100 mls/hr





  Q12H ERNST   Administration


 


Vancomycin HCl 1,250 mg/  250 mls @ 166.667 mls/hr  06/07/17 12:00  06/09/17 03:

52





  Sodium Chloride  IVPB   166.667 mls/hr





  Q8H ERNST   Administration


 


Insulin Glargine  55 units  06/03/17 21:00  06/09/17 09:44





  Lantus(*)  SUBCUT   55 unit





  BID ERNST   Administration


 


Insulin Human Lispro  0 units  06/06/17 16:30  06/09/17 09:43





  Humalog*  SUBCUT   6 unit





  AC ERNST   Administration





  Protocol   


 


Lactobacillus Rhamnosus  1 cap  05/30/17 21:00  06/09/17 09:41





  Culturelle*  PO   1 cap





  BID ERNST   Administration


 


Lisinopril  30 mg  05/31/17 09:00  06/09/17 09:41





  Prinivil Tab*  PO   30 mg





  DAILY ERNST   Administration


 


Lorazepam  1 mg  06/05/17 23:40  06/06/17 00:12





  Ativan Inj*  IV PUSH   1 mg





  Q6H PRN   Administration





  ANXIETY   


 


Nystatin  1 applic  06/07/17 09:00  06/09/17 09:51





  Nystatin Top Powder*  TOPICAL   1 applic





  BID ERNST   Administration


 


Omeprazole  20 mg  05/31/17 07:30  06/09/17 07:59





  Prilosec Cap*  PO   20 mg





  DAILY@0730 ERNST   Administration


 


Ondansetron HCl  4 mg  05/30/17 13:29  06/04/17 02:01





  Zofran Inj*  IV   4 mg





  Q4H PRN   Administration





  NAUSEA/VOMITING   


 


Oxycodone HCl  10 mg  05/30/17 14:46  06/09/17 07:58





  Roxycodone Tab*  PO   10 mg





  Q4H PRN   Administration





  PAIN   


 


Oxycodone HCl  30 mg  06/08/17 21:00  06/09/17 09:41





  Oxycontin(*)  PO   30 mg





  0900,2100 ERNST   Administration


 


Pharmacy Consult  1 note  06/01/17 11:50  





  Vancomycin Per Pharmacy*  FOLLOW UP   





  . PRN   





  PER PROTOCOL   


 


Pharmacy Profile Note  1 note  06/10/17 11:30  





  Vancomycin Trough Check  FOLLOW UP  06/10/17 11:31  





  ONCE ONE

## 2017-06-09 NOTE — PN
Subjective


Date of Service: 06/09/17


Interval History: 





Mr. Katz continues to complain of pain at times but feels that it is 

reasonably well controlled and is not requesting additional pain meds.  He 

denies chest pain, SOB, nausea, or abdominal pain.








Family History: Unchanged from Admission


Social History: Unchanged from Admission


Past Medical History: Unchanged from Admission





Objective


Active Medications: 








Acetaminophen (Tylenol Tab*)  650 mg PO Q4H PRN


   PRN Reason: FEVER/PAIN


   Last Admin: 06/09/17 05:50 Dose:  650 mg


Al Hydrox/Mg Hydrox/Simethicone (Maalox Plus*)  30 ml PO Q6H PRN


   PRN Reason: INDIGESTION


Amlodipine Besylate (Norvasc Tab*)  5 mg PO BEDTIME [facility]


   Last Admin: 06/08/17 21:01 Dose:  5 mg


Atenolol (Tenormin Tab*)  50 mg PO QAM [facility]


   Last Admin: 06/09/17 09:41 Dose:  50 mg


Atorvastatin Calcium (Lipitor*)  80 mg PO DAILY [facility]


   Last Admin: 06/09/17 09:41 Dose:  80 mg


Calcium Carbonate (Tums*)  1,000 mg PO Q4H PRN


   PRN Reason: reflux


   Last Admin: 06/04/17 05:16 Dose:  1,000 mg


Cetirizine HCl (Zyrtec*)  10 mg PO DAILY ERNST


   PRN Reason: Protocol


   Last Admin: 06/09/17 09:43 Dose:  10 mg


Dextrose (D50w Syringe 50 Ml*)  12.5 gm IV PUSH .FOR FS < 60 - SS PRN


   PRN Reason: FS < 60


Docosanol (Abreva 10%*)  1 applic TOPICAL FIVE TIMES DAILY [facility]


   Last Admin: 06/09/17 12:28 Dose:  Not Given


Furosemide (Lasix Tab*)  20 mg PO DAILY [facility]


   Last Admin: 06/09/17 09:41 Dose:  20 mg


Heparin Sodium (Porcine) (Heparin Vial(*))  5,000 units SUBCUT Q8HR [facility]


   Last Admin: 06/09/17 13:24 Dose:  5,000 units


Heparin Sodium (Porcine) (Heparin Flush Picc/Ml/Cvc(*))  1 ml FLUSH 0600,1800 

ERNST


   PRN Reason: Protocol


   Last Admin: 06/09/17 05:51 Dose:  1 ml


Heparin Sodium (Porcine) (Heparin Flush Picc/Ml/Cvc(*))  1 - 3 ml FLUSH 0600,

1800 [facility]


   PRN Reason: Protocol


   Last Admin: 06/09/17 05:57 Dose:  Not Given


Sodium Chloride (Ns 0.9% 500 Ml Bag*)  500 mls @ 0 mls/hr IV KVO ERNST


   PRN Reason: KVO


Cefepime HCl 2 gm/ Sodium (Chloride)  50 mls @ 100 mls/hr IVPB Q12H [facility]


   Last Admin: 06/09/17 11:48 Dose:  100 mls/hr


Vancomycin HCl 1,250 mg/ (Sodium Chloride)  250 mls @ 166.667 mls/hr IVPB Q8H 

[facility]


   Last Admin: 06/09/17 12:27 Dose:  166.667 mls/hr


Insulin Glargine (Lantus(*))  55 units SUBCUT BID [facility]


   Last Admin: 06/09/17 09:44 Dose:  55 unit


Insulin Human Lispro (Humalog*)  0 units SUBCUT AC [facility]


   PRN Reason: Protocol


   Last Admin: 06/09/17 13:23 Dose:  3 unit


Lactobacillus Rhamnosus (Culturelle*)  1 cap PO BID [facility]


   Last Admin: 06/09/17 09:41 Dose:  1 cap


Lisinopril (Prinivil Tab*)  30 mg PO DAILY [facility]


   Last Admin: 06/09/17 09:41 Dose:  30 mg


Nystatin (Nystatin Top Powder*)  1 applic TOPICAL BID [facility]


   Last Admin: 06/09/17 09:51 Dose:  1 applic


Omeprazole (Prilosec Cap*)  20 mg PO DAILY@0730 [facility]


   Last Admin: 06/09/17 07:59 Dose:  20 mg


Ondansetron HCl (Zofran Inj*)  4 mg IV Q4H PRN


   PRN Reason: NAUSEA/VOMITING


   Last Admin: 06/04/17 02:01 Dose:  4 mg


Oxycodone HCl (Oxycontin(*))  30 mg PO 0900,2100 [facility]


   Last Admin: 06/09/17 09:41 Dose:  30 mg


Oxycodone HCl (Roxycodone Tab*)  10 mg PO Q4H PRN


   PRN Reason: PAIN


   Last Admin: 06/09/17 13:23 Dose:  10 mg


Pharmacy Consult (Vancomycin Per Pharmacy*)  1 note FOLLOW UP . PRN


   PRN Reason: PER PROTOCOL


Pharmacy Profile Note (Vancomycin Trough Check)  1 note FOLLOW UP ONCE ONE


   Stop: 06/10/17 11:31








 Vital Signs











  06/08/17 06/08/17 06/08/17





  15:00 15:31 15:33


 


Temperature   98.5 F


 


Pulse Rate   74


 


Respiratory 16 16 16





Rate   


 


Blood Pressure   107/52





(mmHg)   


 


O2 Sat by Pulse 94  94





Oximetry   














  06/08/17 06/08/17 06/08/17





  15:36 15:45 16:14


 


Temperature   


 


Pulse Rate   75


 


Respiratory  16 





Rate   


 


Blood Pressure   





(mmHg)   


 


O2 Sat by Pulse 94  96





Oximetry   














  06/08/17 06/08/17 06/08/17





  17:27 17:32 19:30


 


Temperature   99.5 F


 


Pulse Rate   84


 


Respiratory 20 16 22





Rate   


 


Blood Pressure   108/42





(mmHg)   


 


O2 Sat by Pulse   93





Oximetry   














  06/08/17 06/08/17 06/08/17





  19:32 20:00 21:00


 


Temperature   


 


Pulse Rate   


 


Respiratory 16 16 18





Rate   


 


Blood Pressure   





(mmHg)   


 


O2 Sat by Pulse   





Oximetry   














  06/08/17 06/08/17 06/08/17





  21:33 22:33 23:00


 


Temperature   


 


Pulse Rate   


 


Respiratory 16 12 14





Rate   


 


Blood Pressure   





(mmHg)   


 


O2 Sat by Pulse   





Oximetry   














  06/08/17 06/09/17 06/09/17





  23:33 00:00 00:18


 


Temperature   98.2 F


 


Pulse Rate   77


 


Respiratory 14  14





Rate   


 


Blood Pressure   114/50





(mmHg)   


 


O2 Sat by Pulse  94 94





Oximetry   














  06/09/17 06/09/17 06/09/17





  03:49 03:52 05:52


 


Temperature 98.1 F  


 


Pulse Rate 83  


 


Respiratory 16 16 14





Rate   


 


Blood Pressure 134/64  





(mmHg)   


 


O2 Sat by Pulse 94  





Oximetry   














  06/09/17 06/09/17 06/09/17





  06:32 07:32 07:43


 


Temperature   98.4 F


 


Pulse Rate   89


 


Respiratory 20 18 20





Rate   


 


Blood Pressure   118/69





(mmHg)   


 


O2 Sat by Pulse   93





Oximetry   














  06/09/17 06/09/17 06/09/17





  07:58 08:00 09:41


 


Temperature   


 


Pulse Rate   


 


Respiratory 18 16 18





Rate   


 


Blood Pressure   





(mmHg)   


 


O2 Sat by Pulse  93 





Oximetry   














  06/09/17 06/09/17 06/09/17





  09:58 11:41 11:58


 


Temperature   98.2 F


 


Pulse Rate   74


 


Respiratory 16 16 12





Rate   


 


Blood Pressure   102/55





(mmHg)   


 


O2 Sat by Pulse   98





Oximetry   














  06/09/17





  13:23


 


Temperature 


 


Pulse Rate 


 


Respiratory 18





Rate 


 


Blood Pressure 





(mmHg) 


 


O2 Sat by Pulse 





Oximetry 











Oxygen Devices in Use Now: None


Appearance: Obese male sitting up in chair in NAD


Eyes: No Scleral Icterus


Ears/Nose/Mouth/Throat: Mucous Membranes Moist


Neck: Trachea Midline


Respiratory: Symmetrical Chest Expansion and Respiratory Effort, Clear to 

Auscultation


Cardiovascular: NL Sounds; No Murmurs; No JVD, No Edema


Abdominal: NL Sounds; No Tenderness; No Distention


Lymphatic: No Cervical Adenopathy


Extremities: - - Blister to R great toe, L BKA dressing CDI


Skin: No Rash or Ulcers


Neurological: Alert and Oriented x 3, NL Muscle Strength and Tone


Nutrition: Taking PO's


Result Diagrams: 


 06/06/17 06:05





 06/06/17 06:05


Microbiology and Other Data: 


 Microbiology











 05/31/17 07:54 Aerobic Blood Culture - Preliminary





 Blood Venous    No Growth Day 1





 Anaerobic Blood Culture - Preliminary





    No Growth Day 1


 


 05/30/17 23:34 Aerobic Blood Culture - Preliminary





 Blood Venous    No Growth Day 1





 Anaerobic Blood Culture - Preliminary





    No Growth Day 1











Diagnostic Imaging: 





Echo 5/8/17 - LVEF 55-60%, mod LVH, diastolic dysfunction, mild to mod reduced 

RV function, unable to assess RV function





MRI L ankle - osteomyelitis of the L calcaneus that looks to have significantly 

advanced when compared to 3/2017





MRI R foot - Moderate degree of subcutaneous edema. No evidence of bone marrow 

edema or replacement to suggest osteomyelitis is present.











Assess/Plan/Problems-Billing


Assessment: 





Mr. Katz is a 62 yo gentleman with known osteomyelitis of L calcaneus, poorly 

controlled diabetes, chronic RV failure and diastolic dysfunction with 

significant LE edema as well as untreated DAVID, HTN, COPD, morbid obesity and 

chronic pain who was referred to the hospital by Dr Kuhn.





 





- Patient Problems


(1) Cellulitis


Comment: Now s/p left BKA.  Also has blister to R great toe with erythema.  

Continue Vanco and cefepime per ID recommendation.  PICC line placed.  D/C PCA.

     





(2) Chronic pain


Comment: Chronic back, knee, shoulder, and leg pain.  Was let go from Dr. Taylor'

s practice due to improper use of pain meds.  Prescribed Suboxone by Dr Wilder 

outpatient.  Continue oxycontin with prn oxycodone.  Dr. Reno consulted for 

management recommendations.   





(3) COPD (chronic obstructive pulmonary disease)


Comment: No acute exacerbation   





(4) Chronic heart failure


Comment: Asymptomatic.  Echo from earlier this month shows RV failure and 

diastolic dysfunction which appears to be chronic in nature.  RV pressure 

unable to be measured, but assumed he has pulm HTN from untreated DAVID.  

Attempted CPAP, but still unable to tolerate.  Continue lasix.   





(5) DAVID (obstructive sleep apnea)


Comment: Non-compliant with CPAP at home.  Attempted CPAP here but pt unable to 

tolerate.   





(6) Diabetes


Comment: IDDM.  HgbA1c 9.7%.   this AM.  Continue lantus with mealtime 

Humalog coverage.   





(7) Hyperlipidemia


Comment: Continue statin   





(8) Morbid obesity


Comment: BMI 50.  Bariatric surgery within the last 12 months with 65# weight 

loss   





(9) HTN (hypertension)


Comment: BP well controlled. Continue lisinopril, atenolol.   





(10) Lymphedema


Comment: Chronic,  Likely related to RV and diastolic failure, Continue 

compression and diuresis.   





(11) DVT prophylaxis


Comment: HSQ   





(12) Full code status





Status and Disposition: 





Inpatient.  Pt will likely need rehab.

## 2017-06-10 LAB — BUN SERPL-MCNC: 15 MG/DL (ref 6–24)

## 2017-06-10 PROCEDURE — 0T9B70Z DRAINAGE OF BLADDER WITH DRAINAGE DEVICE, VIA NATURAL OR ARTIFICIAL OPENING: ICD-10-PCS | Performed by: HOSPITALIST

## 2017-06-10 RX ADMIN — ATORVASTATIN CALCIUM SCH MG: 80 TABLET, FILM COATED ORAL at 08:58

## 2017-06-10 RX ADMIN — OXYCODONE HYDROCHLORIDE PRN MG: 5 CAPSULE ORAL at 11:39

## 2017-06-10 RX ADMIN — OXYCODONE HYDROCHLORIDE PRN MG: 5 CAPSULE ORAL at 19:48

## 2017-06-10 RX ADMIN — OXYCODONE HYDROCHLORIDE PRN MG: 5 CAPSULE ORAL at 15:38

## 2017-06-10 RX ADMIN — Medication SCH: at 06:09

## 2017-06-10 RX ADMIN — Medication SCH CAP: at 22:28

## 2017-06-10 RX ADMIN — VANCOMYCIN HYDROCHLORIDE SCH MLS/HR: 1 INJECTION, POWDER, LYOPHILIZED, FOR SOLUTION INTRAVENOUS at 12:30

## 2017-06-10 RX ADMIN — OXYCODONE HYDROCHLORIDE PRN MG: 5 CAPSULE ORAL at 02:10

## 2017-06-10 RX ADMIN — INSULIN GLARGINE SCH UNIT: 100 INJECTION, SOLUTION SUBCUTANEOUS at 08:59

## 2017-06-10 RX ADMIN — HYDROMORPHONE HYDROCHLORIDE PRN MG: 1 INJECTION, SOLUTION INTRAMUSCULAR; INTRAVENOUS; SUBCUTANEOUS at 23:49

## 2017-06-10 RX ADMIN — HEPARIN SODIUM SCH UNITS: 5000 INJECTION INTRAVENOUS; SUBCUTANEOUS at 14:05

## 2017-06-10 RX ADMIN — DOCOSANOL SCH: 100 CREAM TOPICAL at 08:51

## 2017-06-10 RX ADMIN — AMLODIPINE BESYLATE SCH MG: 5 TABLET ORAL at 22:26

## 2017-06-10 RX ADMIN — OXYCODONE HYDROCHLORIDE PRN MG: 5 CAPSULE ORAL at 06:00

## 2017-06-10 RX ADMIN — INSULIN LISPRO SCH UNIT: 100 INJECTION, SOLUTION INTRAVENOUS; SUBCUTANEOUS at 17:25

## 2017-06-10 RX ADMIN — OXYCODONE HYDROCHLORIDE SCH MG: 10 TABLET, FILM COATED, EXTENDED RELEASE ORAL at 08:59

## 2017-06-10 RX ADMIN — CETIRIZINE HYDROCHLORIDE SCH MG: 10 TABLET, FILM COATED ORAL at 08:59

## 2017-06-10 RX ADMIN — INSULIN GLARGINE SCH UNIT: 100 INJECTION, SOLUTION SUBCUTANEOUS at 22:26

## 2017-06-10 RX ADMIN — ATENOLOL SCH MG: 50 TABLET ORAL at 08:59

## 2017-06-10 RX ADMIN — Medication SCH ML: at 06:09

## 2017-06-10 RX ADMIN — INSULIN LISPRO SCH UNIT: 100 INJECTION, SOLUTION INTRAVENOUS; SUBCUTANEOUS at 08:59

## 2017-06-10 RX ADMIN — OXYCODONE HYDROCHLORIDE SCH MG: 10 TABLET, FILM COATED, EXTENDED RELEASE ORAL at 22:25

## 2017-06-10 RX ADMIN — NYSTATIN SCH APPLIC: 100000 POWDER TOPICAL at 22:27

## 2017-06-10 RX ADMIN — HEPARIN SODIUM SCH UNITS: 5000 INJECTION INTRAVENOUS; SUBCUTANEOUS at 05:55

## 2017-06-10 RX ADMIN — VANCOMYCIN HYDROCHLORIDE SCH MLS/HR: 1 INJECTION, POWDER, LYOPHILIZED, FOR SOLUTION INTRAVENOUS at 03:55

## 2017-06-10 RX ADMIN — HEPARIN SODIUM SCH UNITS: 5000 INJECTION INTRAVENOUS; SUBCUTANEOUS at 22:29

## 2017-06-10 RX ADMIN — FUROSEMIDE SCH MG: 20 TABLET ORAL at 08:59

## 2017-06-10 RX ADMIN — OXYCODONE HYDROCHLORIDE PRN MG: 5 CAPSULE ORAL at 23:49

## 2017-06-10 RX ADMIN — LISINOPRIL SCH MG: 10 TABLET ORAL at 08:58

## 2017-06-10 RX ADMIN — NYSTATIN SCH APPLIC: 100000 POWDER TOPICAL at 08:25

## 2017-06-10 RX ADMIN — VANCOMYCIN HYDROCHLORIDE SCH MLS/HR: 1 INJECTION, POWDER, LYOPHILIZED, FOR SOLUTION INTRAVENOUS at 19:49

## 2017-06-10 RX ADMIN — DOCOSANOL SCH: 100 CREAM TOPICAL at 06:10

## 2017-06-10 RX ADMIN — Medication SCH ML: at 17:25

## 2017-06-10 RX ADMIN — Medication SCH CAP: at 08:58

## 2017-06-10 RX ADMIN — CEFEPIME HYDROCHLORIDE SCH MLS/HR: 2 INJECTION, POWDER, FOR SOLUTION INTRAVENOUS at 22:25

## 2017-06-10 RX ADMIN — OMEPRAZOLE SCH MG: 20 CAPSULE, DELAYED RELEASE ORAL at 07:28

## 2017-06-10 RX ADMIN — INSULIN LISPRO SCH UNIT: 100 INJECTION, SOLUTION INTRAVENOUS; SUBCUTANEOUS at 12:44

## 2017-06-10 RX ADMIN — CEFEPIME HYDROCHLORIDE SCH MLS/HR: 2 INJECTION, POWDER, FOR SOLUTION INTRAVENOUS at 10:01

## 2017-06-10 NOTE — PN
Subjective


Date of Service: 06/10/17


Interval History: 





Patient seen and examined at bedside. Pt states that he is having "burning pain

" in his left LE. He is requesting more pain medication. Pt also reports that 

he is having difficulty sleeping. Denies fever, chills, shortness of breath, 

chest discomfort, N/V/D. 





NS staff reporting excoriation to [place] area from urinary incontinence and pt 

only able to void a small amount at a time. Pt had a diop placed and was found 

to have 1,200 ml in his bladder.








Family History: Unchanged from Admission


Social History: Unchanged from Admission


Past Medical History: Unchanged from Admission





Objective


Active Medications: 





Acetaminophen (Tylenol Tab*)  650 mg PO Q4H PRN Reason: FEVER/PAIN


Al Hydrox/Mg Hydrox/Simethicone (Maalox Plus*)  30 ml PO Q6H PRN Reason: 

INDIGESTION


Amlodipine Besylate (Norvasc Tab*)  5 mg PO BEDTIME ERNST


Atenolol (Tenormin Tab*)  50 mg PO QAM ERNST


Atorvastatin Calcium (Lipitor*)  80 mg PO DAILY ERNST


Calcium Carbonate (Tums*)  1,000 mg PO Q4H PRN Reason: reflux


Cetirizine HCl (Zyrtec*)  10 mg PO DAILY ERNST Reason: Protocol


Dextrose (D50w Syringe 50 Ml*)  12.5 gm IV PUSH .FOR FS < 60 - SS PRN Reason: 

FS < 60


Furosemide (Lasix Tab*)  20 mg PO DAILY [facility]


Heparin Sodium (Porcine) (Heparin Vial(*))  5,000 units SUBCUT Q8HR ERNST


Heparin Sodium (Porcine) (Heparin Flush Picc/Ml/Cvc(*))  1 ml FLUSH 0600,1800 

ERNST Reason: Protocol


Sodium Chloride (Ns 0.9% 500 Ml Bag*)  500 mls @ 0 mls/hr IV KVO ERNST Reason: KVO


Cefepime HCl 2 gm/ Sodium (Chloride)  50 mls @ 100 mls/hr IVPB Q12H ERNST


Vancomycin HCl 1,250 mg/ (Sodium Chloride)  250 mls @ 166.667 mls/hr IVPB Q8H 

ERNST


Insulin Glargine (Lantus(*))  55 units SUBCUT BID ERNST


Insulin Human Lispro (Humalog*)  0 units SUBCUT AC ERNST Reason: Protocol


Lactobacillus Rhamnosus (Culturelle*)  1 cap PO BID ERNST


Lisinopril (Prinivil Tab*)  30 mg PO DAILY ERNST


Nystatin (Nystatin Top Powder*)  1 applic TOPICAL BID ERNST


Omeprazole (Prilosec Cap*)  20 mg PO DAILY@0730 ERNST


Ondansetron HCl (Zofran Inj*)  4 mg IV Q4H PRN Reason: NAUSEA/VOMITING


Oxycodone HCl (Oxycontin(*))  30 mg PO 0900,2100 [facility]


Oxycodone HCl (Roxycodone Tab*)  15 mg PO Q4H PRN Reason: PAIN


Pharmacy Consult (Vancomycin Per Pharmacy*)  1 note FOLLOW UP . PRN Reason: PER 

PROTOCOL





 Vital Signs











  06/09/17 06/09/17 06/09/17





  17:27 18:25 19:15


 


Temperature   98.4 F


 


Pulse Rate   77


 


Respiratory 18 20 18





Rate   


 


Blood Pressure   130/52





(mmHg)   


 


O2 Sat by Pulse   97





Oximetry   

















  06/09/17 06/09/17 06/09/17





  22:18 22:24 22:53


 


Temperature  98.3 F 


 


Pulse Rate  79 


 


Respiratory 16 16 18





Rate   


 


Blood Pressure  109/68 





(mmHg)   


 


O2 Sat by Pulse  99 





Oximetry   














  06/10/17 06/10/17 06/10/17





  00:18 02:10 03:49


 


Temperature   97.9 F


 


Pulse Rate   78


 


Respiratory 16 18 18





Rate   


 


Blood Pressure   105/54





(mmHg)   


 


O2 Sat by Pulse   95





Oximetry   














  06/10/17 06/10/17 06/10/17





  04:10 06:00 07:34


 


Temperature   98.2 F


 


Pulse Rate   84


 


Respiratory 18 16 18





Rate   


 


Blood Pressure   125/55





(mmHg)   


 


O2 Sat by Pulse   96





Oximetry   














  06/10/17 06/10/17 06/10/17





  08:00 08:59 10:59


 


Temperature   


 


Pulse Rate   


 


Respiratory 18 18 18





Rate   


 


Blood Pressure   





(mmHg)   


 


O2 Sat by Pulse 96  





Oximetry   














  06/10/17 06/10/17 06/10/17





  11:34 11:39 11:41


 


Temperature 99.7 F  


 


Pulse Rate 76  


 


Respiratory  16 18





Rate   


 


Blood Pressure 126/60  





(mmHg)   


 


O2 Sat by Pulse 90  





Oximetry   














  06/10/17 06/10/17 06/10/17





  13:14 15:25 15:38


 


Temperature   


 


Pulse Rate   


 


Respiratory 18  18





Rate   


 


Blood Pressure   





(mmHg)   


 


O2 Sat by Pulse  90 





Oximetry   














  06/10/17





  15:46


 


Temperature 98.2 F


 


Pulse Rate 75


 


Respiratory 18





Rate 


 


Blood Pressure 132/70





(mmHg) 


 


O2 Sat by Pulse 94





Oximetry 











Oxygen Devices in Use Now: None


Appearance: NAD, laying in bed


Eyes: No Scleral Icterus


Respiratory: Symmetrical Chest Expansion and Respiratory Effort, Clear to 

Auscultation


Cardiovascular: NL Sounds; No Murmurs; No JVD, RRR


Abdominal: NL Sounds; No Tenderness; No Distention


Skin: - - Dressing to right LE intact, dressing to left BKA intact.


Neurological: Alert and Oriented x 3, NL Muscle Strength and Tone


Lines/Tubes/Other Access: Clean, Dry and Intact PICC Line - site benign


Nutrition: Taking PO's


Result Diagrams: 


 06/06/17 06:05





 06/10/17 06:05


Microbiology and Other Data: 


 Microbiology











 05/31/17 07:54 Aerobic Blood Culture - Preliminary





 Blood Venous    No Growth Day 1





 Anaerobic Blood Culture - Preliminary





    No Growth Day 1


 


 05/30/17 23:34 Aerobic Blood Culture - Preliminary





 Blood Venous    No Growth Day 1





 Anaerobic Blood Culture - Preliminary





    No Growth Day 1











Diagnostic Imaging: 





Echo 5/8/17 - LVEF 55-60%, mod LVH, diastolic dysfunction, mild to mod reduced 

RV function, unable to assess RV function





MRI L ankle - osteomyelitis of the L calcaneus that looks to have significantly 

advanced when compared to 3/2017





MRI R foot - Moderate degree of subcutaneous edema. No evidence of bone marrow 

edema or replacement to suggest osteomyelitis is present.











Assess/Plan/Problems-Billing


Assessment: 





Mr. Katz is a 60 yo gentleman with known osteomyelitis of L calcaneus, poorly 

controlled diabetes, chronic RV failure and diastolic dysfunction with 

significant LE edema as well as untreated DAVID, HTN, COPD, morbid obesity and 

chronic pain who was referred to the hospital by Dr Kuhn.





 





- Patient Problems


(1) Cellulitis


Code(s): L03.90 - CELLULITIS, UNSPECIFIED   SNOMED Code(s): 025183861


   Comment: 


- Now s/p left BKA.  


- Also has blister to R great toe with erythema.  


- Continue Vanco and cefepime per ID recommendation.  PICC line placed.    





(2) Urinary retention


Code(s): R33.9 - RETENTION OF URINE, UNSPECIFIED   SNOMED Code(s): 265531674


   Comment: 


- Suspect related to narcotic use and Pt not being able to get up to use the 

bathroom


- Diop placed and will continue for now   





(3) Osteomyelitis


Code(s): M86.9 - OSTEOMYELITIS, UNSPECIFIED   SNOMED Code(s): 66064084


   Comment: 


- Chronic L calcaneal osteomyelitis


- Repeat MRI shows rather significant progression of the osteomyelitis since 

March


- MRI of the R foot - moderate degree of subcutaneous edema. No evidence bone 

marrow edema or replacement to suggest ostromyelitits.


- S/P left BKA by Dr Kuhn on 6/5


   





(4) Chronic pain


Code(s): G89.29 - OTHER CHRONIC PAIN   SNOMED Code(s): 57772110


   Comment: 


- Chronic back, knee, shoulder, and leg pain.  


- Was let go from Dr. Taylor's practice due to improper use of pain meds.  


- Prescribed Suboxone by Dr Wilder outpatient.  


- Continue oxycontin with prn oxycodone.  Dilaudid PRN for breakthrough pain


- Dr. Reno consulted for pain management recommendations.   





(5) Chronic heart failure


Code(s): I50.9 - HEART FAILURE, UNSPECIFIED   SNOMED Code(s): 90442377


   Comment: 


- Asymptomatic.  


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 

appears to be chronic in nature.  RV pressure unable to be measured, but 

assumed he has pulm HTN from untreated DAVID.  


- Attempted CPAP, but still unable to tolerate.  


- Continue lasix.   





(6) DAVID (obstructive sleep apnea)


Code(s): G47.33 - OBSTRUCTIVE SLEEP APNEA (ADULT) (PEDIATRIC)   SNOMED Code(s): 

95170071


   Comment: 


- Non-compliant with CPAP at home.  Attempted CPAP here but pt unable to 

tolerate.   





(7) COPD (chronic obstructive pulmonary disease)


Code(s): J44.9 - CHRONIC OBSTRUCTIVE PULMONARY DISEASE, UNSPECIFIED   SNOMED 

Code(s): 02859389


   Comment: 


- No acute exacerbation   





(8) Lymphedema


Code(s): I89.0 - LYMPHEDEMA, NOT ELSEWHERE CLASSIFIED   SNOMED Code(s): 

073157006


   Comment: 


- Chronic,  Likely related to RV and diastolic failure. 


- Continue compression and diuresis.   





(9) Diabetes


Code(s): E11.9 - TYPE 2 DIABETES MELLITUS WITHOUT COMPLICATIONS   SNOMED Code(s)

: 85016581


   Comment: 


- IDDM.  


- HgbA1c 9.7%.  


- 's-260's today.  


- Continue lantus with mealtime Humalog coverage.   





(10) Hyperlipidemia


Code(s): E78.5 - HYPERLIPIDEMIA, UNSPECIFIED   SNOMED Code(s): 35200863


   Comment: 


- Continue statin   





(11) HTN (hypertension)


Code(s): I10 - ESSENTIAL (PRIMARY) HYPERTENSION   SNOMED Code(s): 23495615


   Comment: 


- BP well controlled. 


- Continue lisinopril and atenolol.   





(12) Morbid obesity


Code(s): E66.01 - MORBID (SEVERE) OBESITY DUE TO EXCESS CALORIES   SNOMED Code(s

): 242051867


   Comment: 


- BMI 50.  


- Bariatric surgery within the last 12 months with 65# weight loss   





(13) DVT prophylaxis


Code(s): SFL2802 -    SNOMED Code(s): 709700607


   Comment: 


- HSQ   





(14) Full code status


Code(s): Z78.9 - OTHER SPECIFIED HEALTH STATUS   SNOMED Code(s): 055486299


   


Status and Disposition: 





Inpatient.  Pt will likely need rehab.

## 2017-06-10 NOTE — PN
Progress Note





- Progress Note


SOAP: 


Subjective:


62 y/o male s/p L BKA 6/5 by Dr. Kuhn.  Patient more awake, alert then 

yesterday, c/o pain with movement/ transfer from chair.  





Objective:


General-  Sitting in chair comfortably, NAD 


MSK-  Dressing intact, no drainage noted, decreased edema from prior examination

, Working with PT 





 Vital Signs











Temp  98.2 F   06/10/17 07:34


 


Pulse  84   06/10/17 07:34


 


Resp  18   06/10/17 08:59


 


BP  125/55   06/10/17 07:34


 


Pulse Ox  96   06/10/17 08:00








 Intake & Output











 06/09/17 06/10/17 06/10/17





 18:59 06:59 18:59


 


Intake Total 564 2135 


 


Output Total 650 2250 200


 


Balance -86 -115 -200


 


Weight   328 lb 4.8 oz


 


Intake:   


 


  IV Fluids  98 


 


    NS (0.9%)  98 


 


  IVPB 384 657 


 


    ABX - VANCOMYCIN 295 586 


 


    LR 0  


 


    NS (0.9%) 89  


 


    cefepime  71 


 


  Oral 180 1380 


 


Output:   


 


  Urine 650 2250 200


 


Other:   


 


  Estimated Stool Amount Large  








 Laboratory Results - last 24 hr











  06/09/17 06/09/17 06/09/17





  11:53 16:37 20:52


 


BUN   


 


Creatinine   


 


Est GFR ( Amer)   


 


Est GFR (Non-Af Amer)   


 


POC Glucose (mg/dL)  180 H  270 H  306 H














  06/10/17 06/10/17





  06:05 07:28


 


BUN  15 


 


Creatinine  0.72 


 


Est GFR ( Amer)  143.7 


 


Est GFR (Non-Af Amer)  111.7 


 


POC Glucose (mg/dL)   176 H

















Assessment:


62 y/o male s/p L BKA 6/5 by Dr. Kuhn. 








Plan:





- PICC placed, IV ABX per Dr. Mejia 


- F/U with Dr. Kuhn within 10-14 days post-op 


- Likely rehab placement 


- Continue PT/ OT  


- Continue dressing changes 





 Active Medications











Generic Name Dose Route Start Last Admin





  Trade Name Freq  PRN Reason Stop Dose Admin


 


Acetaminophen  650 mg  05/30/17 13:29  06/09/17 05:50





  Tylenol Tab*  PO   650 mg





  Q4H PRN   Administration





  FEVER/PAIN   


 


Al Hydrox/Mg Hydrox/Simethicone  30 ml  06/04/17 17:45  





  Maalox Plus*  PO   





  Q6H PRN   





  INDIGESTION   


 


Amlodipine Besylate  5 mg  05/30/17 21:00  06/09/17 20:53





  Norvasc Tab*  PO   5 mg





  BEDTIME ERNST   Administration


 


Atenolol  50 mg  05/31/17 09:00  06/10/17 08:59





  Tenormin Tab*  PO   50 mg





  QAM ERNST   Administration


 


Atorvastatin Calcium  80 mg  05/31/17 09:00  06/10/17 08:58





  Lipitor*  PO   80 mg





  DAILY ERNST   Administration


 


Calcium Carbonate  1,000 mg  05/30/17 16:56  06/04/17 05:16





  Tums*  PO   1,000 mg





  Q4H PRN   Administration





  reflux   


 


Cetirizine HCl  10 mg  06/03/17 18:00  06/10/17 08:59





  Zyrtec*  PO   10 mg





  DAILY ERNST   Administration





  Protocol   


 


Dextrose  12.5 gm  05/30/17 15:02  





  D50w Syringe 50 Ml*  IV PUSH   





  .FOR FS < 60 - SS PRN   





  FS < 60   


 


Docosanol  1 applic  06/03/17 18:00  06/10/17 08:51





  Abreva 10%*  TOPICAL   Not Given





  FIVE TIMES DAILY ERNST   


 


Furosemide  20 mg  06/07/17 09:00  06/10/17 08:59





  Lasix Tab*  PO   20 mg





  DAILY ERNST   Administration


 


Heparin Sodium (Porcine)  5,000 units  05/30/17 14:00  06/10/17 05:55





  Heparin Vial(*)  SUBCUT   5,000 units





  Q8HR ERNST   Administration


 


Heparin Sodium (Porcine)  1 ml  06/02/17 18:00  06/10/17 06:09





  Heparin Flush Picc/Ml/Cvc(*)  FLUSH   Not Given





  0600,1800 [facility]   





  Protocol   


 


Heparin Sodium (Porcine)  1 - 3 ml  06/08/17 18:00  06/10/17 06:09





  Heparin Flush Picc/Ml/Cvc(*)  FLUSH   1 ml





  0600,1800 [facility]   Administration





  Protocol   


 


Sodium Chloride  500 mls @ 0 mls/hr  06/06/17 13:00  





  Ns 0.9% 500 Ml Bag*  IV   





  KVO ERNST   





  KVO   


 


Cefepime HCl 2 gm/ Sodium  50 mls @ 100 mls/hr  06/07/17 11:00  06/09/17 22:32





  Chloride  IVPB   100 mls/hr





  Q12H ERNST   Administration


 


Vancomycin HCl 1,250 mg/  250 mls @ 166.667 mls/hr  06/07/17 12:00  06/10/17 03:

55





  Sodium Chloride  IVPB   166.667 mls/hr





  Q8H ERNST   Administration


 


Insulin Glargine  55 units  06/03/17 21:00  06/10/17 08:59





  Lantus(*)  SUBCUT   55 unit





  BID ERNST   Administration


 


Insulin Human Lispro  0 units  06/06/17 16:30  06/10/17 08:59





  Humalog*  SUBCUT   3 unit





  AC ERNST   Administration





  Protocol   


 


Lactobacillus Rhamnosus  1 cap  05/30/17 21:00  06/10/17 08:58





  Culturelle*  PO   1 cap





  BID ERNST   Administration


 


Lisinopril  30 mg  05/31/17 09:00  06/10/17 08:58





  Prinivil Tab*  PO   30 mg





  DAILY ERNST   Administration


 


Nystatin  1 applic  06/07/17 09:00  06/10/17 08:25





  Nystatin Top Powder*  TOPICAL   1 applic





  BID ERNST   Administration


 


Omeprazole  20 mg  05/31/17 07:30  06/10/17 07:28





  Prilosec Cap*  PO   20 mg





  DAILY@0730 ERNST   Administration


 


Ondansetron HCl  4 mg  05/30/17 13:29  06/04/17 02:01





  Zofran Inj*  IV   4 mg





  Q4H PRN   Administration





  NAUSEA/VOMITING   


 


Oxycodone HCl  30 mg  06/08/17 21:00  06/10/17 08:59





  Oxycontin(*)  PO   30 mg





  0900,2100 ERNST   Administration


 


Oxycodone HCl  15 mg  06/09/17 18:31  06/10/17 06:00





  Roxycodone Tab*  PO   15 mg





  Q4H PRN   Administration





  PAIN   


 


Pharmacy Consult  1 note  06/01/17 11:50  





  Vancomycin Per Pharmacy*  FOLLOW UP   





  . PRN   





  PER PROTOCOL   


 


Pharmacy Profile Note  1 note  06/10/17 11:30  





  Vancomycin Trough Check  FOLLOW UP  06/10/17 11:31  





  ONCE ONE

## 2017-06-11 LAB
HCT VFR BLD AUTO: 32 % (ref 42–52)
HGB BLD-MCNC: 10.1 G/DL (ref 14–18)
MCH RBC QN AUTO: 24 PG (ref 27–31)
MCHC RBC AUTO-ENTMCNC: 31 G/DL (ref 31–36)
MCV RBC AUTO: 78 FL (ref 80–94)
RBC # BLD AUTO: 4.14 10^6/UL (ref 4–5.4)
WBC # BLD AUTO: 7.8 10^3/UL (ref 3.5–10.8)

## 2017-06-11 RX ADMIN — OXYCODONE HYDROCHLORIDE PRN MG: 5 CAPSULE ORAL at 20:35

## 2017-06-11 RX ADMIN — INSULIN LISPRO SCH UNIT: 100 INJECTION, SOLUTION INTRAVENOUS; SUBCUTANEOUS at 09:03

## 2017-06-11 RX ADMIN — CEFEPIME HYDROCHLORIDE SCH MLS/HR: 2 INJECTION, POWDER, FOR SOLUTION INTRAVENOUS at 22:50

## 2017-06-11 RX ADMIN — OXYCODONE HYDROCHLORIDE PRN MG: 5 CAPSULE ORAL at 14:44

## 2017-06-11 RX ADMIN — OXYCODONE HYDROCHLORIDE SCH MG: 10 TABLET, FILM COATED, EXTENDED RELEASE ORAL at 20:34

## 2017-06-11 RX ADMIN — INSULIN LISPRO SCH UNIT: 100 INJECTION, SOLUTION INTRAVENOUS; SUBCUTANEOUS at 12:50

## 2017-06-11 RX ADMIN — PREGABALIN SCH MG: 25 CAPSULE ORAL at 09:05

## 2017-06-11 RX ADMIN — Medication SCH CAP: at 20:35

## 2017-06-11 RX ADMIN — PREGABALIN SCH MG: 25 CAPSULE ORAL at 20:35

## 2017-06-11 RX ADMIN — VANCOMYCIN HYDROCHLORIDE SCH MLS/HR: 1 INJECTION, POWDER, LYOPHILIZED, FOR SOLUTION INTRAVENOUS at 20:47

## 2017-06-11 RX ADMIN — INSULIN GLARGINE SCH UNIT: 100 INJECTION, SOLUTION SUBCUTANEOUS at 09:03

## 2017-06-11 RX ADMIN — VANCOMYCIN HYDROCHLORIDE SCH MLS/HR: 1 INJECTION, POWDER, LYOPHILIZED, FOR SOLUTION INTRAVENOUS at 11:52

## 2017-06-11 RX ADMIN — HEPARIN SODIUM SCH UNITS: 5000 INJECTION INTRAVENOUS; SUBCUTANEOUS at 13:43

## 2017-06-11 RX ADMIN — AMLODIPINE BESYLATE SCH MG: 5 TABLET ORAL at 20:35

## 2017-06-11 RX ADMIN — CETIRIZINE HYDROCHLORIDE SCH MG: 10 TABLET, FILM COATED ORAL at 09:05

## 2017-06-11 RX ADMIN — HYDROMORPHONE HYDROCHLORIDE PRN MG: 1 INJECTION, SOLUTION INTRAMUSCULAR; INTRAVENOUS; SUBCUTANEOUS at 16:36

## 2017-06-11 RX ADMIN — HEPARIN SODIUM SCH UNITS: 5000 INJECTION INTRAVENOUS; SUBCUTANEOUS at 22:50

## 2017-06-11 RX ADMIN — OMEPRAZOLE SCH MG: 20 CAPSULE, DELAYED RELEASE ORAL at 07:35

## 2017-06-11 RX ADMIN — Medication SCH ML: at 06:16

## 2017-06-11 RX ADMIN — INSULIN GLARGINE SCH UNIT: 100 INJECTION, SOLUTION SUBCUTANEOUS at 20:47

## 2017-06-11 RX ADMIN — OXYCODONE HYDROCHLORIDE SCH MG: 10 TABLET, FILM COATED, EXTENDED RELEASE ORAL at 09:05

## 2017-06-11 RX ADMIN — CEFEPIME HYDROCHLORIDE SCH MLS/HR: 2 INJECTION, POWDER, FOR SOLUTION INTRAVENOUS at 10:37

## 2017-06-11 RX ADMIN — Medication SCH ML: at 13:43

## 2017-06-11 RX ADMIN — ATORVASTATIN CALCIUM SCH MG: 80 TABLET, FILM COATED ORAL at 09:05

## 2017-06-11 RX ADMIN — Medication SCH CAP: at 09:05

## 2017-06-11 RX ADMIN — NYSTATIN SCH APPLIC: 100000 POWDER TOPICAL at 20:47

## 2017-06-11 RX ADMIN — OXYCODONE HYDROCHLORIDE PRN MG: 5 CAPSULE ORAL at 04:28

## 2017-06-11 RX ADMIN — HEPARIN SODIUM SCH UNITS: 5000 INJECTION INTRAVENOUS; SUBCUTANEOUS at 06:13

## 2017-06-11 RX ADMIN — NYSTATIN SCH APPLIC: 100000 POWDER TOPICAL at 09:04

## 2017-06-11 RX ADMIN — INSULIN LISPRO SCH UNIT: 100 INJECTION, SOLUTION INTRAVENOUS; SUBCUTANEOUS at 17:24

## 2017-06-11 RX ADMIN — FUROSEMIDE SCH MG: 20 TABLET ORAL at 09:06

## 2017-06-11 RX ADMIN — Medication SCH ML: at 16:38

## 2017-06-11 RX ADMIN — ATENOLOL SCH MG: 50 TABLET ORAL at 09:04

## 2017-06-11 RX ADMIN — HYDROMORPHONE HYDROCHLORIDE PRN MG: 1 INJECTION, SOLUTION INTRAMUSCULAR; INTRAVENOUS; SUBCUTANEOUS at 09:56

## 2017-06-11 RX ADMIN — LISINOPRIL SCH MG: 10 TABLET ORAL at 09:06

## 2017-06-11 RX ADMIN — VANCOMYCIN HYDROCHLORIDE SCH MLS/HR: 1 INJECTION, POWDER, LYOPHILIZED, FOR SOLUTION INTRAVENOUS at 04:28

## 2017-06-11 NOTE — PN
Progress Note





- Progress Note


SOAP: 


Subjective:


61 y/o male s/p L BKA 6/5 by Dr. Kuhn.  Patient states feeling better with 

addition of dilaudid IV  and Lyrica in pain regimen, Dr. Reno following.   








Objective:


General-  Well appearing, NAD, sitting comfortably in chair eating breakfast 


MSK-  R LE dressing recently changed, minimal edema noted, dry, flaking skin 

with mild erythema from calf to foot, healing blister noted distal 1, 2 toes, 

no drainage noted, no lymphatic streaking noted, decreased pain during 

examination than prior exams.  L stump with minimal, foul smelling ser-ang 

drainage from lateral posterior portion, + tender with movement/ deep palpation 

of stump, dressing intact, no streaking/ erythema at proximal portion of 

dressing.  





 Vital Signs











Temp  98.2 F   06/11/17 07:38


 


Pulse  86   06/11/17 07:38


 


Resp  18   06/11/17 10:43


 


BP  142/69   06/11/17 07:38


 


Pulse Ox  95   06/11/17 07:38








 Intake & Output











 06/10/17 06/11/17 06/11/17





 18:59 06:59 18:59


 


Intake Total 2221 1115 600


 


Output Total 3000 2775 425


 


Balance -779 -1660 175


 


Weight 328 lb 4.8 oz  328 lb 8 oz


 


Intake:   


 


  IV Fluids 31 25 


 


    ABX - VANCOMYCIN  25 


 


    NS (0.9%) 31  


 


  IVPB 260 590 


 


    ABX - VANCOMYCIN 260 535 


 


    cefepime  55 


 


  Oral 1930 500 600


 


Output:   


 


  Urine 450  


 


  [place] 1350 2775 425


 


  Residual 1200  


 


    14 Fr 1200  








 Laboratory Results - last 24 hr











  06/10/17 06/10/17 06/10/17





  11:25 11:29 16:38


 


WBC   


 


RBC   


 


Hgb   


 


Hct   


 


MCV   


 


MCH   


 


MCHC   


 


RDW   


 


Plt Count   


 


MPV   


 


Neut % (Auto)   


 


Lymph % (Auto)   


 


Mono % (Auto)   


 


Eos % (Auto)   


 


Baso % (Auto)   


 


Absolute Neuts (auto)   


 


Absolute Lymphs (auto)   


 


Absolute Monos (auto)   


 


Absolute Eos (auto)   


 


Absolute Basos (auto)   


 


Absolute Nucleated RBC   


 


Nucleated RBC %   


 


POC Glucose (mg/dL)   262 H  214 H


 


Vancomycin Trough  14.6  














  06/10/17 06/11/17 06/11/17





  22:00 06:15 07:43


 


WBC   7.8 


 


RBC   4.14 


 


Hgb   10.1 L 


 


Hct   32 L 


 


MCV   78 L 


 


MCH   24 L 


 


MCHC   31 


 


RDW   17 H 


 


Plt Count   272 


 


MPV   8 


 


Neut % (Auto)   73.1 


 


Lymph % (Auto)   7.1 L 


 


Mono % (Auto)   14.9 H 


 


Eos % (Auto)   4.3 


 


Baso % (Auto)   0.6 


 


Absolute Neuts (auto)   5.7 


 


Absolute Lymphs (auto)   0.6 L 


 


Absolute Monos (auto)   1.2 H 


 


Absolute Eos (auto)   0.3 


 


Absolute Basos (auto)   0 


 


Absolute Nucleated RBC   0.01 


 


Nucleated RBC %   0.1 


 


POC Glucose (mg/dL)  208 H   245 H


 


Vancomycin Trough   














Assessment:


61 y/o male s/p L BKA 6/5 by Dr. Kuhn.








Plan:


- Labs improved, BS continue to be elevated but more stable than past 


- NPO p MN for possible wash-out, dressing change tomorrow 


- Appreciate Pain managements recommendations. 


- ABX per Dr. Mejia-  currently vanco, cefepime 


- Discussed with Dr. Kuhn- will eval Monday AM.  








 Active Medications











Generic Name Dose Route Start Last Admin





  Trade Name Freq  PRN Reason Stop Dose Admin


 


Acetaminophen  650 mg  05/30/17 13:29  06/09/17 05:50





  Tylenol Tab*  PO   650 mg





  Q4H PRN   Administration





  FEVER/PAIN   


 


Al Hydrox/Mg Hydrox/Simethicone  30 ml  06/04/17 17:45  





  Maalox Plus*  PO   





  Q6H PRN   





  INDIGESTION   


 


Amlodipine Besylate  5 mg  05/30/17 21:00  06/10/17 22:26





  Norvasc Tab*  PO   5 mg





  BEDTIME ERNST   Administration


 


Atenolol  50 mg  05/31/17 09:00  06/11/17 09:04





  Tenormin Tab*  PO   50 mg





  QAM ERNST   Administration


 


Atorvastatin Calcium  80 mg  05/31/17 09:00  06/11/17 09:05





  Lipitor*  PO   80 mg





  DAILY ERNST   Administration


 


Calcium Carbonate  1,000 mg  05/30/17 16:56  06/04/17 05:16





  Tums*  PO   1,000 mg





  Q4H PRN   Administration





  reflux   


 


Cetirizine HCl  10 mg  06/03/17 18:00  06/11/17 09:05





  Zyrtec*  PO   10 mg





  DAILY ERNST   Administration





  Protocol   


 


Dextrose  12.5 gm  05/30/17 15:02  





  D50w Syringe 50 Ml*  IV PUSH   





  .FOR FS < 60 - SS PRN   





  FS < 60   


 


Furosemide  20 mg  06/07/17 09:00  06/11/17 09:06





  Lasix Tab*  PO   20 mg





  DAILY ERNST   Administration


 


Heparin Sodium (Porcine)  5,000 units  05/30/17 14:00  06/11/17 06:13





  Heparin Vial(*)  SUBCUT   5,000 units





  Q8HR ERNST   Administration


 


Heparin Sodium (Porcine)  1 ml  06/02/17 18:00  06/11/17 06:16





  Heparin Flush Picc/Ml/Cvc(*)  FLUSH   1 ml





  0600,1800 ERNST   Administration





  Protocol   


 


Hydromorphone HCl  0.5 mg  06/10/17 21:20  06/11/17 09:56





  Dilaudid Iv*  IV SLOW PU   0.5 mg





  Q6H PRN   Administration





  PAIN - BREAKTHROUGH   


 


Sodium Chloride  500 mls @ 0 mls/hr  06/06/17 13:00  





  Ns 0.9% 500 Ml Bag*  IV   





  KVO ERNST   





  KVO   


 


Cefepime HCl 2 gm/ Sodium  50 mls @ 100 mls/hr  06/07/17 11:00  06/11/17 10:37





  Chloride  IVPB   100 mls/hr





  Q12H ERNST   Administration


 


Vancomycin HCl 1,250 mg/  250 mls @ 166.667 mls/hr  06/07/17 12:00  06/11/17 04:

28





  Sodium Chloride  IVPB   166.667 mls/hr





  Q8H ERNST   Administration


 


Insulin Glargine  55 units  06/03/17 21:00  06/11/17 09:03





  Lantus(*)  SUBCUT   55 unit





  BID ERNST   Administration


 


Insulin Human Lispro  0 units  06/06/17 16:30  06/11/17 09:03





  Humalog*  SUBCUT   6 unit





  AC ERNST   Administration





  Protocol   


 


Lactobacillus Rhamnosus  1 cap  05/30/17 21:00  06/11/17 09:05





  Culturelle*  PO   1 cap





  BID ERNST   Administration


 


Lisinopril  30 mg  05/31/17 09:00  06/11/17 09:06





  Prinivil Tab*  PO   30 mg





  DAILY ERNST   Administration


 


Melatonin  3 mg  06/10/17 16:40  





  Melatonin (Nf)  PO   





  BEDTIME PRN   





  INSOMNIA   


 


Nystatin  1 applic  06/07/17 09:00  06/11/17 09:04





  Nystatin Top Powder*  TOPICAL   1 applic





  BID ERNST   Administration


 


Omeprazole  20 mg  05/31/17 07:30  06/11/17 07:35





  Prilosec Cap*  PO   20 mg





  DAILY@0730 ERNST   Administration


 


Ondansetron HCl  4 mg  05/30/17 13:29  06/04/17 02:01





  Zofran Inj*  IV   4 mg





  Q4H PRN   Administration





  NAUSEA/VOMITING   


 


Oxycodone HCl  30 mg  06/08/17 21:00  06/11/17 09:05





  Oxycontin(*)  PO   30 mg





  0900,2100 ERNST   Administration


 


Oxycodone HCl  15 mg  06/09/17 18:31  06/10/17 23:49





  Roxycodone Tab*  PO   15 mg





  Q4H PRN   Administration





  PAIN   


 


Pharmacy Consult  1 note  06/01/17 11:50  





  Vancomycin Per Pharmacy*  FOLLOW UP   





  . PRN   





  PER PROTOCOL   


 


Pregabalin  75 mg  06/11/17 09:00  06/11/17 09:05





  Lyrica Cap(*)  PO   75 mg





  BID ERNST   Administration

## 2017-06-11 NOTE — PN
Subjective


Date of Service: 06/11/17


Interval History: 





Patient seen and examined at bedside. Pt states that he continues to have 

burning and throbbing pain in his left stump. Pt states that he feels the pain 

is improving with the changes to the pain medication. Denies fever, chills, 

shortness of breath, chest discomfort, N/V/D. 





[facility] [place] is having periods of confusion and has been more 

drowsy today.








Family History: Unchanged from Admission


Social History: Unchanged from Admission


Past Medical History: Unchanged from Admission





Objective


Active Medications: 





Acetaminophen (Tylenol Tab*)  650 mg PO Q4H PRN Reason: FEVER/PAIN


Al Hydrox/Mg Hydrox/Simethicone (Maalox Plus*)  30 ml PO Q6H PRN Reason: 

INDIGESTION


Amlodipine Besylate (Norvasc Tab*)  5 mg PO BEDTIME ERNST


Atenolol (Tenormin Tab*)  50 mg PO QAM ERNST


Atorvastatin Calcium (Lipitor*)  80 mg PO DAILY ERNST


Calcium Carbonate (Tums*)  1,000 mg PO Q4H PRN Reason: reflux


Cetirizine HCl (Zyrtec*)  10 mg PO DAILY [facility] Reason: Protocol


Dextrose (D50w Syringe 50 Ml*)  12.5 gm IV PUSH .FOR FS < 60 - SS PRN Reason: 

FS < 60


Furosemide (Lasix Tab*)  20 mg PO DAILY ERNST


Heparin Sodium (Porcine) (Heparin Vial(*))  5,000 units SUBCUT Q8HR ERNST


Heparin Sodium (Porcine) (Heparin Flush Picc/Ml/Cvc(*))  1 ml FLUSH 0600,1800 

ERNST Reason: Protocol


Hydromorphone HCl (Dilaudid Iv*)  0.5 mg IV SLOW PU Q6H PRN Reason: PAIN - 

BREAKTHROUGH


Sodium Chloride (Ns 0.9% 500 Ml Bag*)  500 mls @ 0 mls/hr IV KVO ERNST Reason: KVO


Cefepime HCl 2 gm/ Sodium (Chloride)  50 mls @ 100 mls/hr IVPB Q12H ERNST


Vancomycin HCl 1,250 mg/ (Sodium Chloride)  250 mls @ 166.667 mls/hr IVPB Q8H 

ERNST


Insulin Glargine (Lantus(*))  55 units SUBCUT BID ERNST


Insulin Human Lispro (Humalog*)  0 units SUBCUT AC ERNST Reason: Protocol


Lactobacillus Rhamnosus (Culturelle*)  1 cap PO BID ERNST


Lisinopril (Prinivil Tab*)  30 mg PO DAILY [facility]


Melatonin (Melatonin (Nf))  3 mg PO BEDTIME PRN Reason: INSOMNIA


Nystatin (Nystatin Top Powder*)  1 applic TOPICAL BID [facility]


Omeprazole (Prilosec Cap*)  20 mg PO DAILY@0730 [facility]


Ondansetron HCl (Zofran Inj*)  4 mg IV Q4H PRN Reason: NAUSEA/VOMITING


Oxycodone HCl (Oxycontin(*))  30 mg PO 0900,2100 [facility]


Oxycodone HCl (Roxycodone Tab*)  15 mg PO Q4H PRN Reason: PAIN


Pharmacy Consult (Vancomycin Per Pharmacy*)  1 note FOLLOW UP . PRN Reason: PER 

PROTOCOL


Pregabalin (Lyrica Cap(*))  75 mg PO BID [facility]





 Vital Signs














  06/10/17 06/10/17 06/10/17





  19:08 19:15 19:48


 


Temperature 98.8 F  


 


Pulse Rate 76  


 


Respiratory 18 16 18





Rate   


 


Blood Pressure 124/62  





(mmHg)   


 


O2 Sat by Pulse 93  





Oximetry   














  06/10/17 06/10/17 06/10/17





  21:48 22:25 23:14


 


Temperature   98.0 F


 


Pulse Rate   77


 


Respiratory 16 16 16





Rate   


 


Blood Pressure   142/68





(mmHg)   


 


O2 Sat by Pulse   92





Oximetry   














  06/10/17 06/11/17 06/11/17





  23:49 00:00 00:25


 


Temperature   


 


Pulse Rate   


 


Respiratory 16  14





Rate   


 


Blood Pressure   





(mmHg)   


 


O2 Sat by Pulse  92 





Oximetry   














  06/11/17 06/11/17 06/11/17





  00:49 01:49 03:42


 


Temperature   98.4 F


 


Pulse Rate   80


 


Respiratory 16 14 16





Rate   


 


Blood Pressure   136/72





(mmHg)   


 


O2 Sat by Pulse   92





Oximetry   














  06/11/17 06/11/17 06/11/17





  07:38 08:00 09:05


 


Temperature 98.2 F  


 


Pulse Rate 86  


 


Respiratory 18 20 18





Rate   


 


Blood Pressure 142/69  





(mmHg)   


 


O2 Sat by Pulse 95  





Oximetry   

















  06/11/17 06/11/17 06/11/17





  11:18 14:44 15:32


 


Temperature 97.9 F  98.6 F


 


Pulse Rate 74  73


 


Respiratory 16 20 20





Rate   


 


Blood Pressure 119/60  131/57





(mmHg)   


 


O2 Sat by Pulse 97  95





Oximetry   














Oxygen Devices in Use Now: Nasal Cannula - 2 L


Appearance: NAD, laying in bed


Ears/Nose/Mouth/Throat: Mucous Membranes Moist


Respiratory: Symmetrical Chest Expansion and Respiratory Effort, Clear to 

Auscultation


Cardiovascular: NL Sounds; No Murmurs; No JVD, RRR


Abdominal: NL Sounds; No Tenderness; No Distention


Extremities: - - Edema to bilateral LE


Skin: - - Dressing to bilateral LE, left stump dressing with serosang drainage 

and right LE dressing intact. Blisters to 1st and 2nd toes on right foot.


Neurological: Alert and Oriented x 3, NL Muscle Strength and Tone


Lines/Tubes/Other Access: Clean, Dry and Intact PICC Line - site benign


Nutrition: Taking PO's


Result Diagrams: 


 06/11/17 06:15





 06/10/17 06:05


Microbiology and Other Data: 


 Microbiology











 05/31/17 07:54 Aerobic Blood Culture - Preliminary





 Blood Venous    No Growth Day 1





 Anaerobic Blood Culture - Preliminary





    No Growth Day 1


 


 05/30/17 23:34 Aerobic Blood Culture - Preliminary





 Blood Venous    No Growth Day 1





 Anaerobic Blood Culture - Preliminary





    No Growth Day 1











Diagnostic Imaging: 





Echo 5/8/17 - LVEF 55-60%, mod LVH, diastolic dysfunction, mild to mod reduced 

RV function, unable to assess RV function





MRI L ankle - osteomyelitis of the L calcaneus that looks to have significantly 

advanced when compared to 3/2017





MRI R foot - Moderate degree of subcutaneous edema. No evidence of bone marrow 

edema or replacement to suggest osteomyelitis is present.











Assess/Plan/Problems-Billing


Assessment: 





Mr. Katz is a 60 yo gentleman with known osteomyelitis of L calcaneus, poorly 

controlled diabetes, chronic RV failure and diastolic dysfunction with 

significant LE edema as well as untreated DAVID, HTN, COPD, morbid obesity and 

chronic pain who was referred to the hospital by Dr Kuhn.





 





- Patient Problems


(1) Cellulitis


Code(s): L03.90 - CELLULITIS, UNSPECIFIED   SNOMED Code(s): 571748108


   Comment: 


- Now s/p left BKA.  


- Also has blister to R great toe and 2nd toe with erythema.  


- Continue Vanco and cefepime per ID recommendation.  PICC line placed.    





(2) Urinary retention


Code(s): R33.9 - RETENTION OF URINE, UNSPECIFIED   SNOMED Code(s): 885449361


   Comment: 


- Suspect related to narcotic use and Pt not being able to get up to use the 

bathroom


- Lyons placed and will continue for now   





(3) Osteomyelitis


Code(s): M86.9 - OSTEOMYELITIS, UNSPECIFIED   SNOMED Code(s): 14912364


   Comment: 


- Chronic L calcaneal osteomyelitis


- Repeat MRI shows rather significant progression of the osteomyelitis since 

March


- MRI of the R foot - moderate degree of subcutaneous edema. No evidence bone 

marrow edema or replacement to suggest ostromyelitits.


- S/P left BKA by Dr Kuhn on 6/5


   





(4) Chronic pain


Code(s): G89.29 - OTHER CHRONIC PAIN   SNOMED Code(s): 58419336


   Comment: 


- Chronic back, knee, shoulder, and leg pain.  


- Was let go from Dr. Taylor's practice due to improper use of pain meds.  


- Prescribed Suboxone by Dr Wilder outpatient.  


- Dr. Reno consulted for pain management recommendations, input appreciated.


- Continue oxycontin with prn oxycodone.


- Will stop dilaudid and decrease oxycodone dose as Pt has been confused and 

more drowsy today.


- Started on Lyrica per Dr. Reno.


   





(5) Chronic heart failure


Code(s): I50.9 - HEART FAILURE, UNSPECIFIED   SNOMED Code(s): 96305224


   Comment: 


- Asymptomatic.  


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 

appears to be chronic in nature.  RV pressure unable to be measured, but 

assumed he has pulm HTN from untreated DAVID.  


- Attempted CPAP, but still unable to tolerate.  


- Continue lasix.   





(6) DAVID (obstructive sleep apnea)


Code(s): G47.33 - OBSTRUCTIVE SLEEP APNEA (ADULT) (PEDIATRIC)   SNOMED Code(s): 

03174063


   Comment: 


- Non-compliant with CPAP at home.  Attempted CPAP here but pt unable to 

tolerate.   





(7) COPD (chronic obstructive pulmonary disease)


Code(s): J44.9 - CHRONIC OBSTRUCTIVE PULMONARY DISEASE, UNSPECIFIED   SNOMED 

Code(s): 50889944


   Comment: 


- No acute exacerbation   





(8) Lymphedema


Code(s): I89.0 - LYMPHEDEMA, NOT ELSEWHERE CLASSIFIED   SNOMED Code(s): 

013865080


   Comment: 


- Chronic,  Likely related to RV and diastolic failure. 


- Continue compression and diuresis.   





(9) Diabetes


Code(s): E11.9 - TYPE 2 DIABETES MELLITUS WITHOUT COMPLICATIONS   SNOMED Code(s)

: 09373329


   Comment: 


- IDDM.  


- HgbA1c 9.7%.  


- 's-320's today.  


- Continue lantus with mealtime Humalog coverage.   





(10) Hyperlipidemia


Code(s): E78.5 - HYPERLIPIDEMIA, UNSPECIFIED   SNOMED Code(s): 95892526


   Comment: 


- Continue statin   





(11) HTN (hypertension)


Code(s): I10 - ESSENTIAL (PRIMARY) HYPERTENSION   SNOMED Code(s): 98807083


   Comment: 


- BP well controlled. 


- Continue lisinopril and atenolol.   





(12) Morbid obesity


Code(s): E66.01 - MORBID (SEVERE) OBESITY DUE TO EXCESS CALORIES   SNOMED Code(s

): 915244347


   Comment: 


- BMI 50.  


- Bariatric surgery within the last 12 months with 65# weight loss   





(13) DVT prophylaxis


Code(s): BRD5268 -    SNOMED Code(s): 944804316


   Comment: 


- HSQ   





(14) Full code status


Code(s): Z78.9 - OTHER SPECIFIED HEALTH STATUS   SNOMED Code(s): 245274183


   


Status and Disposition: 





Inpatient.  Pt will likely need rehab.

## 2017-06-12 LAB
ANION GAP SERPL CALC-SCNC: -4 MMOL/L (ref 2–11)
BUN SERPL-MCNC: 18 MG/DL (ref 6–24)
BUN/CREAT SERPL: 23.4 (ref 8–20)
CALCIUM SERPL-MCNC: 8.9 MG/DL (ref 8.6–10.3)
CHLORIDE SERPL-SCNC: 94 MMOL/L (ref 101–111)
GLUCOSE SERPL-MCNC: 247 MG/DL (ref 70–100)
HCO3 SERPL-SCNC: 43 MMOL/L (ref 22–32)
HCT VFR BLD AUTO: 32 % (ref 42–52)
HGB BLD-MCNC: 10.1 G/DL (ref 14–18)
MCH RBC QN AUTO: 24 PG (ref 27–31)
MCHC RBC AUTO-ENTMCNC: 31 G/DL (ref 31–36)
MCV RBC AUTO: 78 FL (ref 80–94)
O2/TOTAL GAS SETTING VFR VENT: 2 %
POTASSIUM SERPL-SCNC: 4.3 MMOL/L (ref 3.5–5)
RBC # BLD AUTO: 4.12 10^6/UL (ref 4–5.4)
SODIUM SERPL-SCNC: 133 MMOL/L (ref 133–145)
WBC # BLD AUTO: 6.9 10^3/UL (ref 3.5–10.8)

## 2017-06-12 RX ADMIN — OXYCODONE HYDROCHLORIDE SCH MG: 10 TABLET, FILM COATED, EXTENDED RELEASE ORAL at 07:30

## 2017-06-12 RX ADMIN — PREGABALIN SCH MG: 25 CAPSULE ORAL at 20:55

## 2017-06-12 RX ADMIN — ATENOLOL SCH MG: 50 TABLET ORAL at 07:31

## 2017-06-12 RX ADMIN — INSULIN GLARGINE SCH UNIT: 100 INJECTION, SOLUTION SUBCUTANEOUS at 08:50

## 2017-06-12 RX ADMIN — FUROSEMIDE SCH MG: 20 TABLET ORAL at 08:49

## 2017-06-12 RX ADMIN — CETIRIZINE HYDROCHLORIDE SCH MG: 10 TABLET, FILM COATED ORAL at 07:31

## 2017-06-12 RX ADMIN — HEPARIN SODIUM SCH UNITS: 5000 INJECTION INTRAVENOUS; SUBCUTANEOUS at 20:58

## 2017-06-12 RX ADMIN — INSULIN GLARGINE SCH UNIT: 100 INJECTION, SOLUTION SUBCUTANEOUS at 21:00

## 2017-06-12 RX ADMIN — CEFEPIME HYDROCHLORIDE SCH MLS/HR: 2 INJECTION, POWDER, FOR SOLUTION INTRAVENOUS at 10:59

## 2017-06-12 RX ADMIN — HYDROMORPHONE HYDROCHLORIDE PRN MG: 2 INJECTION, SOLUTION INTRAMUSCULAR; INTRAVENOUS; SUBCUTANEOUS at 11:42

## 2017-06-12 RX ADMIN — Medication SCH: at 18:03

## 2017-06-12 RX ADMIN — OXYCODONE HYDROCHLORIDE PRN MG: 5 CAPSULE ORAL at 04:10

## 2017-06-12 RX ADMIN — INSULIN LISPRO SCH UNIT: 100 INJECTION, SOLUTION INTRAVENOUS; SUBCUTANEOUS at 13:12

## 2017-06-12 RX ADMIN — HEPARIN SODIUM SCH UNITS: 5000 INJECTION INTRAVENOUS; SUBCUTANEOUS at 07:30

## 2017-06-12 RX ADMIN — VANCOMYCIN HYDROCHLORIDE SCH: 1 INJECTION, POWDER, LYOPHILIZED, FOR SOLUTION INTRAVENOUS at 12:48

## 2017-06-12 RX ADMIN — ATORVASTATIN CALCIUM SCH MG: 80 TABLET, FILM COATED ORAL at 07:31

## 2017-06-12 RX ADMIN — OXYCODONE HYDROCHLORIDE SCH MG: 10 TABLET, FILM COATED, EXTENDED RELEASE ORAL at 20:55

## 2017-06-12 RX ADMIN — VANCOMYCIN HYDROCHLORIDE SCH MLS/HR: 1 INJECTION, POWDER, LYOPHILIZED, FOR SOLUTION INTRAVENOUS at 04:06

## 2017-06-12 RX ADMIN — INSULIN LISPRO SCH UNIT: 100 INJECTION, SOLUTION INTRAVENOUS; SUBCUTANEOUS at 08:49

## 2017-06-12 RX ADMIN — OMEPRAZOLE SCH MG: 20 CAPSULE, DELAYED RELEASE ORAL at 07:31

## 2017-06-12 RX ADMIN — OXYCODONE HYDROCHLORIDE PRN MG: 5 CAPSULE ORAL at 20:56

## 2017-06-12 RX ADMIN — INSULIN LISPRO SCH UNIT: 100 INJECTION, SOLUTION INTRAVENOUS; SUBCUTANEOUS at 18:03

## 2017-06-12 RX ADMIN — Medication SCH CAP: at 09:35

## 2017-06-12 RX ADMIN — NYSTATIN SCH APPLIC: 100000 POWDER TOPICAL at 21:04

## 2017-06-12 RX ADMIN — HEPARIN SODIUM SCH UNITS: 5000 INJECTION INTRAVENOUS; SUBCUTANEOUS at 13:13

## 2017-06-12 RX ADMIN — Medication SCH ML: at 00:05

## 2017-06-12 RX ADMIN — Medication SCH ML: at 05:11

## 2017-06-12 RX ADMIN — PREGABALIN SCH MG: 25 CAPSULE ORAL at 08:48

## 2017-06-12 RX ADMIN — HYDROMORPHONE HYDROCHLORIDE PRN MG: 2 INJECTION, SOLUTION INTRAMUSCULAR; INTRAVENOUS; SUBCUTANEOUS at 22:22

## 2017-06-12 RX ADMIN — OXYCODONE HYDROCHLORIDE PRN MG: 5 CAPSULE ORAL at 08:41

## 2017-06-12 RX ADMIN — OXYCODONE HYDROCHLORIDE PRN MG: 5 CAPSULE ORAL at 12:41

## 2017-06-12 RX ADMIN — NYSTATIN SCH APPLIC: 100000 POWDER TOPICAL at 10:09

## 2017-06-12 RX ADMIN — Medication SCH CAP: at 20:55

## 2017-06-12 RX ADMIN — OXYCODONE HYDROCHLORIDE PRN MG: 5 CAPSULE ORAL at 16:30

## 2017-06-12 RX ADMIN — Medication SCH ML: at 22:59

## 2017-06-12 RX ADMIN — ACETAMINOPHEN SCH MG: 325 TABLET ORAL at 13:20

## 2017-06-12 RX ADMIN — AMLODIPINE BESYLATE SCH MG: 5 TABLET ORAL at 20:56

## 2017-06-12 RX ADMIN — ACETAMINOPHEN SCH MG: 325 TABLET ORAL at 18:32

## 2017-06-12 RX ADMIN — LISINOPRIL SCH MG: 10 TABLET ORAL at 07:31

## 2017-06-12 NOTE — PN
Subjective


Date of Service: 06/12/17


Interval History: 





Patient seen and examined at bedside. Denies fever, chills, shortness of breath

, chest discomfort, N/V/D. Pt states that his pain is not well controlled. Pt 

also reports not eating well, but sugars are elevated.





O2 sat 84-86% on room air, O2 sat increased 94% on 2 L via NC.








Family History: Unchanged from Admission


Social History: Unchanged from Admission


Past Medical History: Unchanged from Admission





Objective


Active Medications: 





Acetaminophen (Tylenol Tab*)  650 mg PO Q6H ERNST Stop: 06/14/17 12:59


Al Hydrox/Mg Hydrox/Simethicone (Maalox Plus*)  30 ml PO Q6H PRN Reason: 

INDIGESTION


Amlodipine Besylate (Norvasc Tab*)  5 mg PO BEDTIME ERNST


Atenolol (Tenormin Tab*)  50 mg PO QAM ERNST


Atorvastatin Calcium (Lipitor*)  80 mg PO DAILY ERNST


Calcium Carbonate (Tums*)  1,000 mg PO Q4H PRN Reason: reflux


Cetirizine HCl (Zyrtec*)  10 mg PO DAILY ERNST Reason: Protocol


Dextrose (D50w Syringe 50 Ml*)  12.5 gm IV PUSH .FOR FS < 60 - SS PRN Reason: 

FS < 60


Furosemide (Lasix Tab*)  20 mg PO DAILY ERNST


Heparin Sodium (Porcine) (Heparin Vial(*))  5,000 units SUBCUT Q8HR ERNST


Heparin Sodium (Porcine) (Heparin Flush Picc/Ml/Cvc(*))  1 ml FLUSH 0600,1800 

ERNST Reason: Protocol


Hydromorphone HCl (Dilaudid Iv*)  0.5 mg IV SLOW PU Q4H PRN Reason: PAIN


Sodium Chloride (Ns 0.9% 500 Ml Bag*)  500 mls @ 0 mls/hr IV KVO ERNST Reason: KVO


Piperacillin Sod/Tazobactam (Sod 3.375 gm/ Sodium Chloride)  100 mls @ 25 mls/

hr IVPB Q8H ERNST


Insulin Glargine (Lantus(*))  60 units SUBCUT BID ERNST


Insulin Human Lispro (Humalog*)  0 units SUBCUT AC ERNST Reason: Protocol


Lactobacillus Rhamnosus (Culturelle*)  1 cap PO BID ERNST


Lisinopril (Prinivil Tab*)  30 mg PO DAILY ERN[facility]


Melatonin (Melatonin (Nf))  3 mg PO BEDTIME PRN Reason: INSOMNIA


Nystatin (Nystatin Top Powder*)  1 applic TOPICAL BID [facility]


Omeprazole (Prilosec Cap*)  20 mg PO DAILY@0730 [facility]


Ondansetron HCl (Zofran Inj*)  4 mg IV Q4H PRN Reason: NAUSEA/VOMITING


Oxycodone HCl (Oxycontin(*))  30 mg PO 0900,2100 [facility]


Oxycodone HCl (Roxycodone Tab*)  10 mg PO Q4H PRN Reason: PAIN


Pregabalin (Lyrica Cap(*))  75 mg PO BID [facility]





 Vital Signs











  06/11/17 06/11/17 06/11/17





  19:13 20:30 20:34


 


Temperature 98.3 F  


 


Pulse Rate 78  76


 


Respiratory 20 18 18





Rate   


 


Blood Pressure 87/73  125/60





(mmHg)   


 


O2 Sat by Pulse 95  94





Oximetry   

















  06/12/17 06/12/17 06/12/17





  00:11 00:34 04:05


 


Temperature 98.2 F  97.9 F


 


Pulse Rate 83  79


 


Respiratory 16 20 20





Rate   


 


Blood Pressure 129/68  144/65





(mmHg)   


 


O2 Sat by Pulse 94  95





Oximetry   














  06/12/17 06/12/17 06/12/17





  04:10 06:10 07:29


 


Temperature   98.6 F


 


Pulse Rate   81


 


Respiratory 20 18 20





Rate   


 


Blood Pressure   135/60





(mmHg)   


 


O2 Sat by Pulse   97





Oximetry   




















  06/12/17 06/12/17 06/12/17





  10:57 11:42 12:41


 


Temperature 100.9 F  


 


Pulse Rate 92  


 


Respiratory 18 16 10





Rate   


 


Blood Pressure 123/52  





(mmHg)   


 


O2 Sat by Pulse 90  





Oximetry   














  06/12/17 06/12/17 06/12/17





  12:42 14:41 16:15


 


Temperature   97.8 F


 


Pulse Rate   74


 


Respiratory 16 16 18





Rate   


 


Blood Pressure   124/62





(mmHg)   


 


O2 Sat by Pulse   84





Oximetry   














Oxygen Devices in Use Now: Nasal Cannula - 2 L


Appearance: NAD, laying in bed


Ears/Nose/Mouth/Throat: Mucous Membranes Moist


Respiratory: Symmetrical Chest Expansion and Respiratory Effort, Clear to 

Auscultation


Cardiovascular: NL Sounds; No Murmurs; No JVD, RRR


Abdominal: NL Sounds; No Tenderness; No Distention


Extremities: - - Trace to 1+ bilateral LE edema


Skin: - - Blister to right great and 2nd toe. Dressing to right LE and left 

stump clean, dry and intact


Neurological: Alert and Oriented x 3, NL Muscle Strength and Tone


Lines/Tubes/Other Access: Clean, Dry and Intact Lyons - clear yellow urine, 

Clean, Dry and Intact PICC Line - site benign


Nutrition: Taking PO's


Result Diagrams: 


 06/12/17 05:15





 06/12/17 05:15


Microbiology and Other Data: 


 Microbiology











 05/31/17 07:54 Aerobic Blood Culture - Preliminary





 Blood Venous    No Growth Day 1





 Anaerobic Blood Culture - Preliminary





    No Growth Day 1


 


 05/30/17 23:34 Aerobic Blood Culture - Preliminary





 Blood Venous    No Growth Day 1





 Anaerobic Blood Culture - Preliminary





    No Growth Day 1











Diagnostic Imaging: 





Echo 5/8/17 - LVEF 55-60%, mod LVH, diastolic dysfunction, mild to mod reduced 

RV function, unable to assess RV function





MRI L ankle - osteomyelitis of the L calcaneus that looks to have significantly 

advanced when compared to 3/2017





MRI R foot - Moderate degree of subcutaneous edema. No evidence of bone marrow 

edema or replacement to suggest osteomyelitis is present.











Assess/Plan/Problems-Billing


Assessment: 





Mr. Katz is a 60 yo gentleman with known osteomyelitis of L calcaneus, poorly 

controlled diabetes, chronic RV failure and diastolic dysfunction with 

significant LE edema as well as untreated DAVID, HTN, COPD, morbid obesity and 

chronic pain who was referred to the hospital by Dr Kuhn.





 





- Patient Problems


(1) Cellulitis


Code(s): L03.90 - CELLULITIS, UNSPECIFIED   SNOMED Code(s): 743032412


   Comment: 


- Now s/p left BKA.  


- Low grade fever earlier today and leukocytosis resolved.


- Also has blister to R great toe and 2nd toe with erythema. 


- PICC line placed.  


- Discontinue Vanco and cefepime. 


- Started on Zosyn per ID recommendations.    





(2) Urinary retention


Code(s): R33.9 - RETENTION OF URINE, UNSPECIFIED   SNOMED Code(s): 586673709


   Comment: 


- Suspect related to narcotic use and Pt not being able to get up to use the 

bathroom


- Lyons placed and will continue for now   





(3) Osteomyelitis


Code(s): M86.9 - OSTEOMYELITIS, UNSPECIFIED   SNOMED Code(s): 81129565


   Comment: 


- Chronic L calcaneal osteomyelitis


- Repeat MRI shows rather significant progression of the osteomyelitis since 

March


- MRI of the R foot - moderate degree of subcutaneous edema. No evidence bone 

marrow edema or replacement to suggest ostromyelitits.


- S/P left BKA by Dr Kuhn on 6/5


   





(4) Chronic pain


Code(s): G89.29 - OTHER CHRONIC PAIN   SNOMED Code(s): 56936691


   Comment: 


- Chronic back, knee, shoulder, and leg pain.  


- Was let go from Dr. Taylor's practice due to improper use of pain meds.  


- Prescribed Suboxone by Dr Wilder outpatient.  


- Dr. Reno consulted for pain management recommendations, input appreciated.


- Continue oxycontin with prn oxycodone.


- Started on Lyrica per Dr. Reno.


- Will try standing Tylenol for a few days


   





(5) Chronic heart failure


Code(s): I50.9 - HEART FAILURE, UNSPECIFIED   SNOMED Code(s): 16083736


   Comment: 


- Asymptomatic.  


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 

appears to be chronic in nature.  RV pressure unable to be measured, but 

assumed he has pulm HTN from untreated DAVID.  


- Attempted CPAP, but still unable to tolerate.  


- Continue lasix.   





(6) DAVID (obstructive sleep apnea)


Code(s): G47.33 - OBSTRUCTIVE SLEEP APNEA (ADULT) (PEDIATRIC)   SNOMED Code(s): 

90404282


   Comment: 


- Non-compliant with CPAP at home.  Attempted CPAP here but pt unable to 

tolerate.   





(7) COPD (chronic obstructive pulmonary disease)


Code(s): J44.9 - CHRONIC OBSTRUCTIVE PULMONARY DISEASE, UNSPECIFIED   SNOMED 

Code(s): 86641936


   Comment: 


- No acute exacerbation   





(8) Lymphedema


Code(s): I89.0 - LYMPHEDEMA, NOT ELSEWHERE CLASSIFIED   SNOMED Code(s): 

788841988


   Comment: 


- Chronic,  Likely related to RV and diastolic failure. 


- Continue compression and diuresis.   





(9) Diabetes


Code(s): E11.9 - TYPE 2 DIABETES MELLITUS WITHOUT COMPLICATIONS   SNOMED Code(s)

: 51861070


   Comment: 


- IDDM.  


- HgbA1c 9.7%.  


- 's-400's today.  


- Continue lantus with mealtime Humalog coverage.


- Will increase Lantus dose   





(10) Hyperlipidemia


Code(s): E78.5 - HYPERLIPIDEMIA, UNSPECIFIED   SNOMED Code(s): 45373768


   Comment: 


- Continue statin   





(11) HTN (hypertension)


Code(s): I10 - ESSENTIAL (PRIMARY) HYPERTENSION   SNOMED Code(s): 34758563


   Comment: 


- BP well controlled. 


- Continue lisinopril and atenolol.   





(12) Morbid obesity


Code(s): E66.01 - MORBID (SEVERE) OBESITY DUE TO EXCESS CALORIES   SNOMED Code(s

): 683501038


   Comment: 


- BMI 50.  


- Bariatric surgery within the last 12 months with 65# weight loss   





(13) DVT prophylaxis


Code(s): FMV3627 -    SNOMED Code(s): 850651671


   Comment: 


- HSQ   





(14) Full code status


Code(s): Z78.9 - OTHER SPECIFIED HEALTH STATUS   SNOMED Code(s): 366866315


   


Status and Disposition: 





Inpatient.  Pt will likely need rehab.

## 2017-06-12 NOTE — PN
PROGRESS NOTE:

 

DATE OF VISIT:  06/12/17

 

Shane is seen for a dressing change.  I removed the splint from his left leg. 
There was significant amount of drainage and a very strong pseudomonas smell 
and greenish-blackish discharge on the dressings.  The sutures are not nearly 
ready for removal.  I placed a Betadine dressing and Ace wrap and this should 
be changed daily by the nurses and I have contacted Infectious Disease possibly 
for adding an agent for pseudomonas.

 

Shane's blood glucose is still poorly controlled as well as his pain regimen.
  I appreciate Dr. Reno's input adding Lyrica and upping his Suboxone.  In 
my opinion, we need to see improvement in the condition of the stump and better 
blood sugar control and pain management before he can be discharged to rehab.

 

 [id number]/CPS #: [id number]

MTDD

## 2017-06-12 NOTE — PN
Progress Note





- Progress Note


SOAP: 


Subjective:


DOS: 6/12/16


CC: leg infection


HPI: 61 year old man with lymphedema, chronic left heel ulcer s/p BKA.  Pain 

left foot/amp site, concerned about pain regimen.  Right foot blister, foot 

wrapped.  No fever, rash, or diarrhea.  








Objective:


[]


 Vital Signs











Temp  38.3 C   06/12/17 10:57


 


Pulse  92   06/12/17 10:57


 


Resp  10   06/12/17 12:41


 


BP  123/52   06/12/17 10:57


 


Pulse Ox  90   06/12/17 10:57








 Intake & Output











 06/11/17 06/12/17 06/12/17





 18:59 06:59 18:59


 


Intake Total 3421 1547 360


 


Output Total 1525 2075 650


 


Balance 6486 -528 -290


 


Weight 328 lb 8 oz 334 lb 8 oz 


 


Intake:   


 


  IV Fluids 384 27 


 


    ABX - VANCOMYCIN 280  


 


    NS (0.9%) 49 27 


 


    cefepime 55  


 


  IVPB 1347 280 270


 


    ABX - VANCOMYCIN  280 270


 


    NS (0.9%) 1347  


 


  Oral 1690 1240 90


 


Output:   


 


  Lyons 1525 2075 650


 


Other:   


 


  # Bowel Movements 0 0 











Gen:Awake, no distress


Neuro:AAOx3


HEENT:PERRL, MMM


Neck:supple


Heart:RRR no murmur


Lungs:CTA BL


Abd:+BS NT ND soft


Skin: no rash


MSK:L BKA, wrapped; R foot trace edema and erythema , calcaneous and great toe 

small bullae; no wound





 Laboratory Results - last 24 hr











  06/11/17 06/11/17 06/11/17





  12:41 17:17 20:47


 


WBC   


 


RBC   


 


Hgb   


 


Hct   


 


MCV   


 


MCH   


 


MCHC   


 


RDW   


 


Plt Count   


 


MPV   


 


Neut % (Auto)   


 


Lymph % (Auto)   


 


Mono % (Auto)   


 


Eos % (Auto)   


 


Baso % (Auto)   


 


Absolute Neuts (auto)   


 


Absolute Lymphs (auto)   


 


Absolute Monos (auto)   


 


Absolute Eos (auto)   


 


Absolute Basos (auto)   


 


Absolute Nucleated RBC   


 


Nucleated RBC %   


 


Patient Temperature   


 


ABG pH   


 


ABG pCO2   


 


ABG pO2   


 


ABG HCO3   


 


ABG O2 Saturation   


 


ABG Base Excess   


 


Respiration Rate   


 


O2 Delivery Device   


 


Ventilator Type   


 


Vent Mode   


 


FiO2   


 


Inspiratory Time   


 


PEEP   


 


Pressure Support   


 


Pressure Control   


 


EPAP   


 


IPAP   


 


BiPAP   


 


Sodium   


 


Potassium   


 


Chloride   


 


Carbon Dioxide   


 


Anion Gap   


 


BUN   


 


Creatinine   


 


Est GFR ( Amer)   


 


Est GFR (Non-Af Amer)   


 


BUN/Creatinine Ratio   


 


Glucose   


 


POC Glucose (mg/dL)  294 H  320 H  302 H


 


Calcium   














  06/12/17 06/12/17 06/12/17





  05:15 05:15 06:40


 


WBC   6.9 


 


RBC   4.12 


 


Hgb   10.1 L 


 


Hct   32 L 


 


MCV   78 L 


 


MCH   24 L 


 


MCHC   31 


 


RDW   17 H 


 


Plt Count   272 


 


MPV   8 


 


Neut % (Auto)   70.8 


 


Lymph % (Auto)   9.0 L 


 


Mono % (Auto)   15.1 H 


 


Eos % (Auto)   4.5 


 


Baso % (Auto)   0.6 


 


Absolute Neuts (auto)   4.9 


 


Absolute Lymphs (auto)   0.6 L 


 


Absolute Monos (auto)   1.0 H 


 


Absolute Eos (auto)   0.3 


 


Absolute Basos (auto)   0 


 


Absolute Nucleated RBC   0.01 


 


Nucleated RBC %   0.1 


 


Patient Temperature    Not Reportable


 


ABG pH    7.40


 


ABG pCO2    74 H*


 


ABG pO2    80


 


ABG HCO3    38.9 H


 


ABG O2 Saturation    98.4 H


 


ABG Base Excess    17.9 H


 


Respiration Rate    Not Reportable


 


O2 Delivery Device    nc


 


Ventilator Type    Not Reportable


 


Vent Mode    Not Reportable


 


FiO2    2


 


Inspiratory Time    Not Reportable


 


PEEP    Not Reportable


 


Pressure Support    Not Reportable


 


Pressure Control    Not Reportable


 


EPAP    Not Reportable


 


IPAP    Not Reportable


 


BiPAP    Not Reportable


 


Sodium  133  


 


Potassium  4.3  


 


Chloride  94 L  


 


Carbon Dioxide  43 H*  


 


Anion Gap  -4 L  


 


BUN  18  


 


Creatinine  0.77  


 


Est GFR ( Amer)  132.5  


 


Est GFR (Non-Af Amer)  103.1  


 


BUN/Creatinine Ratio  23.4 H  


 


Glucose  247 H  


 


POC Glucose (mg/dL)   


 


Calcium  8.9  








 





Assessment:


1. Left foot chronic osteomyelitis and cellulitis s/p BKA, Dr Kuhn concerned 

there is odor when he changed dressing today


2. Right foot non pressure chronic ulcer


3. diabetes, T2 with neuropathy


4. obesity


5. left leg pain








Plan:


1. DC vanco and cefepime; start zosyn 3.375 gm IV Q8hrs by extended infusion


2. will add trial of scheduled tylenol while his long term regimen is being 

worked out by Dr Reno and hospitalist team

## 2017-06-13 LAB
ANION GAP SERPL CALC-SCNC: 2 MMOL/L (ref 2–11)
BUN SERPL-MCNC: 18 MG/DL (ref 6–24)
BUN/CREAT SERPL: 22.5 (ref 8–20)
CALCIUM SERPL-MCNC: 8.9 MG/DL (ref 8.6–10.3)
CHLORIDE SERPL-SCNC: 87 MMOL/L (ref 101–111)
GLUCOSE SERPL-MCNC: 279 MG/DL (ref 70–100)
HCO3 SERPL-SCNC: 42 MMOL/L (ref 22–32)
HCT VFR BLD AUTO: 33 % (ref 42–52)
HGB BLD-MCNC: 10.2 G/DL (ref 14–18)
MCH RBC QN AUTO: 24 PG (ref 27–31)
MCHC RBC AUTO-ENTMCNC: 31 G/DL (ref 31–36)
MCV RBC AUTO: 78 FL (ref 80–94)
POTASSIUM SERPL-SCNC: 4.3 MMOL/L (ref 3.5–5)
RBC # BLD AUTO: 4.24 10^6/UL (ref 4–5.4)
SODIUM SERPL-SCNC: 131 MMOL/L (ref 133–145)
WBC # BLD AUTO: 8.2 10^3/UL (ref 3.5–10.8)

## 2017-06-13 RX ADMIN — HEPARIN SODIUM SCH UNITS: 5000 INJECTION INTRAVENOUS; SUBCUTANEOUS at 13:40

## 2017-06-13 RX ADMIN — HEPARIN SODIUM SCH UNITS: 5000 INJECTION INTRAVENOUS; SUBCUTANEOUS at 21:35

## 2017-06-13 RX ADMIN — OMEPRAZOLE SCH MG: 20 CAPSULE, DELAYED RELEASE ORAL at 07:33

## 2017-06-13 RX ADMIN — Medication SCH ML: at 23:24

## 2017-06-13 RX ADMIN — FUROSEMIDE SCH MG: 20 TABLET ORAL at 09:33

## 2017-06-13 RX ADMIN — PREGABALIN SCH MG: 25 CAPSULE ORAL at 09:33

## 2017-06-13 RX ADMIN — INSULIN LISPRO SCH UNIT: 100 INJECTION, SOLUTION INTRAVENOUS; SUBCUTANEOUS at 18:06

## 2017-06-13 RX ADMIN — OXYCODONE HYDROCHLORIDE SCH MG: 10 TABLET, FILM COATED, EXTENDED RELEASE ORAL at 09:34

## 2017-06-13 RX ADMIN — ACETAMINOPHEN SCH MG: 325 TABLET ORAL at 13:38

## 2017-06-13 RX ADMIN — ATORVASTATIN CALCIUM SCH MG: 80 TABLET, FILM COATED ORAL at 09:34

## 2017-06-13 RX ADMIN — ATENOLOL SCH MG: 50 TABLET ORAL at 09:34

## 2017-06-13 RX ADMIN — LISINOPRIL SCH MG: 10 TABLET ORAL at 09:33

## 2017-06-13 RX ADMIN — HEPARIN SODIUM SCH UNITS: 5000 INJECTION INTRAVENOUS; SUBCUTANEOUS at 05:08

## 2017-06-13 RX ADMIN — CETIRIZINE HYDROCHLORIDE SCH MG: 10 TABLET, FILM COATED ORAL at 09:34

## 2017-06-13 RX ADMIN — ACETAMINOPHEN SCH MG: 325 TABLET ORAL at 07:32

## 2017-06-13 RX ADMIN — Medication SCH: at 18:22

## 2017-06-13 RX ADMIN — Medication SCH CAP: at 09:33

## 2017-06-13 RX ADMIN — ACETAMINOPHEN SCH MG: 325 TABLET ORAL at 01:00

## 2017-06-13 RX ADMIN — OXYCODONE HYDROCHLORIDE PRN MG: 5 CAPSULE ORAL at 09:34

## 2017-06-13 RX ADMIN — INSULIN LISPRO SCH UNIT: 100 INJECTION, SOLUTION INTRAVENOUS; SUBCUTANEOUS at 09:35

## 2017-06-13 RX ADMIN — PREGABALIN SCH MG: 25 CAPSULE ORAL at 21:34

## 2017-06-13 RX ADMIN — OXYCODONE HYDROCHLORIDE PRN MG: 5 CAPSULE ORAL at 13:38

## 2017-06-13 RX ADMIN — OXYCODONE HYDROCHLORIDE SCH MG: 10 TABLET, FILM COATED, EXTENDED RELEASE ORAL at 21:34

## 2017-06-13 RX ADMIN — NYSTATIN SCH APPLIC: 100000 POWDER TOPICAL at 23:00

## 2017-06-13 RX ADMIN — NYSTATIN SCH: 100000 POWDER TOPICAL at 09:46

## 2017-06-13 RX ADMIN — OXYCODONE HYDROCHLORIDE PRN MG: 5 CAPSULE ORAL at 23:23

## 2017-06-13 RX ADMIN — AMLODIPINE BESYLATE SCH MG: 5 TABLET ORAL at 21:35

## 2017-06-13 RX ADMIN — Medication SCH ML: at 07:33

## 2017-06-13 RX ADMIN — ACETAMINOPHEN SCH MG: 325 TABLET ORAL at 20:24

## 2017-06-13 RX ADMIN — INSULIN GLARGINE SCH UNIT: 100 INJECTION, SOLUTION SUBCUTANEOUS at 21:35

## 2017-06-13 RX ADMIN — INSULIN GLARGINE SCH UNIT: 100 INJECTION, SOLUTION SUBCUTANEOUS at 09:35

## 2017-06-13 RX ADMIN — INSULIN LISPRO SCH UNIT: 100 INJECTION, SOLUTION INTRAVENOUS; SUBCUTANEOUS at 13:37

## 2017-06-13 RX ADMIN — Medication SCH CAP: at 21:34

## 2017-06-13 RX ADMIN — OXYCODONE HYDROCHLORIDE PRN MG: 5 CAPSULE ORAL at 18:05

## 2017-06-13 RX ADMIN — OXYCODONE HYDROCHLORIDE PRN MG: 5 CAPSULE ORAL at 05:07

## 2017-06-13 RX ADMIN — OXYCODONE HYDROCHLORIDE PRN MG: 5 CAPSULE ORAL at 01:00

## 2017-06-13 NOTE — PN
Progress Note





- Progress Note


SOAP: 


Subjective:


[]Patient seen OOB in chair.  Appears comfortable.  Dressing recently changed 

with betadine soaked dressing.  No foul odor or bloody drainage noted.  








Objective:


[]


 Vital Signs











Temp  97.8 F   06/13/17 08:39


 


Pulse  75   06/13/17 08:39


 


Resp  16   06/13/17 09:34


 


BP  132/63   06/13/17 08:39


 


Pulse Ox  95   06/13/17 08:39








 Intake & Output











 06/12/17 06/13/17 06/13/17





 18:59 06:59 18:59


 


Intake Total 585 972 200


 


Output Total 3400 3450 


 


Balance -2815 -2478 200


 


Weight   330 lb 9.6 oz


 


Intake:   


 


  IV Fluids  63 


 


    NS (0.9%)  63 


 


  IVPB 270 229 


 


    ABX - VANCOMYCIN 270  


 


    ABX - ZOSYN  229 


 


  Oral 315 680 200


 


Output:   


 


  Lyons 3400 3450 


 


Other:   


 


  # Bowel Movements  0 








 Laboratory Results - last 24 hr











  06/12/17 06/12/17 06/12/17





  11:31 16:33 21:17


 


WBC   


 


RBC   


 


Hgb   


 


Hct   


 


MCV   


 


MCH   


 


MCHC   


 


RDW   


 


Plt Count   


 


MPV   


 


Neut % (Auto)   


 


Lymph % (Auto)   


 


Mono % (Auto)   


 


Eos % (Auto)   


 


Baso % (Auto)   


 


Absolute Neuts (auto)   


 


Absolute Lymphs (auto)   


 


Absolute Monos (auto)   


 


Absolute Eos (auto)   


 


Absolute Basos (auto)   


 


Absolute Nucleated RBC   


 


Nucleated RBC %   


 


Sodium   


 


Potassium   


 


Chloride   


 


Carbon Dioxide   


 


Anion Gap   


 


BUN   


 


Creatinine   


 


Est GFR ( Amer)   


 


Est GFR (Non-Af Amer)   


 


BUN/Creatinine Ratio   


 


Glucose   


 


POC Glucose (mg/dL)  420 H*  325 H  200 H


 


Calcium   














  06/13/17 06/13/17 06/13/17





  07:46 12:15 12:15


 


WBC    8.2


 


RBC    4.24


 


Hgb    10.2 L


 


Hct    33 L


 


MCV    78 L


 


MCH    24 L


 


MCHC    31


 


RDW    16 H


 


Plt Count    305


 


MPV    8


 


Neut % (Auto)    77.2


 


Lymph % (Auto)    8.4 L


 


Mono % (Auto)    10.9 H


 


Eos % (Auto)    2.7


 


Baso % (Auto)    0.8


 


Absolute Neuts (auto)    6.3


 


Absolute Lymphs (auto)    0.7 L


 


Absolute Monos (auto)    0.9 H


 


Absolute Eos (auto)    0.2


 


Absolute Basos (auto)    0.1


 


Absolute Nucleated RBC    0


 


Nucleated RBC %    0


 


Sodium   131 L 


 


Potassium   4.3 


 


Chloride   87 L 


 


Carbon Dioxide   42 H* 


 


Anion Gap   2 


 


BUN   18 


 


Creatinine   0.80 


 


Est GFR ( Amer)   126.8 


 


Est GFR (Non-Af Amer)   98.6 


 


BUN/Creatinine Ratio   22.5 H 


 


Glucose   279 H 


 


POC Glucose (mg/dL)  219 H  


 


Calcium   8.9 








 Microbiology











 06/05/17 18:30 Anaerobic Culture - Final





 Wound - Left Gram Stain - Final





 Wound Culture - Final





    Serratia Marcescens





    Staphylococcus Lugdenensis


 


 05/31/17 07:54 Aerobic Blood Culture - Final





 Blood Venous    No Growth Day 5





 Anaerobic Blood Culture - Final





    No Growth Day 5





 Blood Culture - Final


 


 05/30/17 23:34 Aerobic Blood Culture - Final





 Blood Venous    No Growth Day 5





 Anaerobic Blood Culture - Final





    No Growth Day 5





 Blood Culture - Final








no active drainage on L BKA stump


right foot continues to improve and is no longer weeping








Assessment:


[]s/p LBKA for chronic osteo left calcaneous


   Lymphedema RLE, ulcer Right great toe, all improving











Plan:


[]Abx changed to Zosyn by Dr. Mejia


  Continue current daily betadine dressing changes per Dr. Kuhn


  Await rehab bed offer

## 2017-06-13 NOTE — PN
Subjective


Date of Service: 06/13/17


Interval History: 





Patient seen and examined at bedside. Pt states that he continue to have pain, 

that he feels is not controlled. Denies fever, chills, shortness of breath, 

chest discomfort, N/V/D.








Family History: Unchanged from Admission


Social History: Unchanged from Admission


Past Medical History: Unchanged from Admission





Objective


Active Medications: 





Acetaminophen (Tylenol Tab*)  650 mg PO 0200,0800,1400,2000 [facility] Stop: 06/14/17 

12:59


Al Hydrox/Mg Hydrox/Simethicone (Maalox Plus*)  30 ml PO Q6H PRN Reason: 

INDIGESTION


Amlodipine Besylate (Norvasc Tab*)  5 mg PO BEDTIME ERNST


Atenolol (Tenormin Tab*)  50 mg PO QAM [facility]


Atorvastatin Calcium (Lipitor*)  80 mg PO DAILY [facility]


Calcium Carbonate (Tums*)  1,000 mg PO Q4H PRN Reason: reflux


Cetirizine HCl (Zyrtec*)  10 mg PO DAILY [facility]


Dextrose (D50w Syringe 50 Ml*)  12.5 gm IV PUSH .FOR FS < 60 - SS PRN Reason: 

FS < 60


Furosemide (Lasix Tab*)  20 mg PO DAILY [facility]


Heparin Sodium (Porcine) (Heparin Vial(*))  5,000 units SUBCUT Q8HR [facility]


Heparin Sodium (Porcine) (Heparin Flush Picc/Ml/Cvc(*))  1 ml FLUSH 0600,1800 

[facility] Reason: Protocol


Hydromorphone HCl (Dilaudid Iv*)  0.5 mg IV SLOW PU Q4H PRN Reason: PAIN


Sodium Chloride (Ns 0.9% 500 Ml Bag*)  500 mls @ 0 mls/hr IV KVO ERNST Reason: KVO


Piperacillin Sod/Tazobactam (Sod 3.375 gm/ Sodium Chloride)  100 mls @ 25 mls/

hr IVPB Q8H [facility]


Insulin Glargine (Lantus(*))  60 units SUBCUT BID [facility]


Insulin Human Lispro (Humalog*)  0 units SUBCUT AC ERNST Reason: Protocol


Lactobacillus Rhamnosus (Culturelle*)  1 cap PO BID [facility]


Lisinopril (Prinivil Tab*)  30 mg PO DAILY [facility]


Melatonin (Melatonin (Nf))  3 mg PO BEDTIME PRN Reason: INSOMNIA


Nystatin (Nystatin Top Powder*)  1 applic TOPICAL BID ERNST


Omeprazole (Prilosec Cap*)  20 mg PO DAILY@0730 [facility]


Ondansetron HCl (Zofran Inj*)  4 mg IV Q4H PRN Reason: NAUSEA/VOMITING


Oxycodone HCl (Oxycontin(*))  30 mg PO 0900,2100 [facility]


Oxycodone HCl (Roxycodone Tab*)  15 mg PO Q4H PRN Reason: PAIN


Pregabalin (Lyrica Cap(*))  75 mg PO BID [facility]





 Vital Signs














  06/12/17 06/12/17 06/12/17





  16:15 16:30 18:30


 


Temperature 97.8 F  


 


Pulse Rate 74  


 


Respiratory 18 16 16





Rate   


 


Blood Pressure 124/62  





(mmHg)   


 


O2 Sat by Pulse 84  





Oximetry   














  06/12/17 06/12/17 06/12/17





  19:16 20:45 20:55


 


Temperature 99.0 F  


 


Pulse Rate 79  


 


Respiratory 20  20





Rate   


 


Blood Pressure 124/55  





(mmHg)   


 


O2 Sat by Pulse 95 95 





Oximetry   




















  06/13/17 06/13/17 06/13/17





  01:05 05:07 05:09


 


Temperature 98.1 F  98.3 F


 


Pulse Rate 76  73


 


Respiratory 18 16 16





Rate   


 


Blood Pressure 122/63  146/74





(mmHg)   


 


O2 Sat by Pulse 97  97





Oximetry   














  06/13/17 06/13/17 06/13/17





  07:07 08:00 08:39


 


Temperature   97.8 F


 


Pulse Rate   75


 


Respiratory 16 16 20





Rate   


 


Blood Pressure   132/63





(mmHg)   


 


O2 Sat by Pulse   95





Oximetry   














Oxygen Devices in Use Now: None


Appearance: NAD, laying in bed


Respiratory: Symmetrical Chest Expansion and Respiratory Effort, Clear to 

Auscultation - , diminished


Cardiovascular: NL Sounds; No Murmurs; No JVD, RRR


Abdominal: NL Sounds; No Tenderness; No Distention


Extremities: - - Trace to 1+ edema


Skin: - - Dressing to left stump clean, dry and intact. Right LE with dry flaky 

skin, ~1x1 cm ulcer and ~ 0.5x0.5 cm ulcer, blister to right great toe and 2nd 

toe. 


Neurological: Alert and Oriented x 3, NL Muscle Strength and Tone


Lines/Tubes/Other Access: Clean, Dry and Intact Peripheral IV - site benign


Nutrition: Taking PO's


Result Diagrams: 


 06/12/17 05:15





 06/12/17 05:15


Microbiology and Other Data: 


 Microbiology











 05/31/17 07:54 Aerobic Blood Culture - Preliminary





 Blood Venous    No Growth Day 1





 Anaerobic Blood Culture - Preliminary





    No Growth Day 1


 


 05/30/17 23:34 Aerobic Blood Culture - Preliminary





 Blood Venous    No Growth Day 1





 Anaerobic Blood Culture - Preliminary





    No Growth Day 1











Diagnostic Imaging: 





Echo 5/8/17 - LVEF 55-60%, mod LVH, diastolic dysfunction, mild to mod reduced 

RV function, unable to assess RV function





MRI L ankle - osteomyelitis of the L calcaneus that looks to have significantly 

advanced when compared to 3/2017





MRI R foot - Moderate degree of subcutaneous edema. No evidence of bone marrow 

edema or replacement to suggest osteomyelitis is present.











Assess/Plan/Problems-Billing


Assessment: 





Mr. Katz is a 60 yo gentleman with known osteomyelitis of L calcaneus, poorly 

controlled diabetes, chronic RV failure and diastolic dysfunction with 

significant LE edema as well as untreated DAVID, HTN, COPD, morbid obesity and 

chronic pain who was referred to the hospital by Dr Kuhn.





 





- Patient Problems


(1) Cellulitis


Code(s): L03.90 - CELLULITIS, UNSPECIFIED   SNOMED Code(s): 770194322


   Comment: 


- Now s/p left BKA.  


- Low grade fever yesterday and leukocytosis resolved.


- Also has blister to R great toe and 2nd toe with erythema. 


- PICC line placed.  


- Continue Zosyn per ID recommendations.    





(2) Urinary retention


Code(s): R33.9 - RETENTION OF URINE, UNSPECIFIED   SNOMED Code(s): 711174743


   Comment: 


- Suspect related to narcotic use and Pt not being able to get up to use the 

bathroom


- Lyons placed and will continue for now   





(3) Osteomyelitis


Code(s): M86.9 - OSTEOMYELITIS, UNSPECIFIED   SNOMED Code(s): 62531084


   Comment: 


- Chronic L calcaneal osteomyelitis


- Repeat MRI shows rather significant progression of the osteomyelitis since 

March


- MRI of the R foot - moderate degree of subcutaneous edema. No evidence bone 

marrow edema or replacement to suggest ostromyelitits.


- S/P left BKA by Dr Kuhn on 6/5


   





(4) Chronic pain


Code(s): G89.29 - OTHER CHRONIC PAIN   SNOMED Code(s): 04831274


   Comment: 


- Chronic back, knee, shoulder, and leg pain.  


- Was let go from Dr. Taylor's practice due to improper use of pain meds.  


- Prescribed Suboxone by Dr Wilder outpatient.  


- Dr. Reno consulted for pain management recommendations, input appreciated.


- Continue oxycontin with prn oxycodone.


- Started on Lyrica per Dr. Reno.


- Will try standing Tylenol for a few days


   





(5) Chronic heart failure


Code(s): I50.9 - HEART FAILURE, UNSPECIFIED   SNOMED Code(s): 64325645


   Comment: 


- Asymptomatic.  


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 

appears to be chronic in nature.  RV pressure unable to be measured, but 

assumed he has pulm HTN from untreated DAVID.  


- Attempted CPAP, but still unable to tolerate.  


- Continue lasix.   





(6) DAVID (obstructive sleep apnea)


Code(s): G47.33 - OBSTRUCTIVE SLEEP APNEA (ADULT) (PEDIATRIC)   SNOMED Code(s): 

90481848


   Comment: 


- Non-compliant with CPAP at home.  Attempted CPAP here but pt unable to 

tolerate.   





(7) COPD (chronic obstructive pulmonary disease)


Code(s): J44.9 - CHRONIC OBSTRUCTIVE PULMONARY DISEASE, UNSPECIFIED   SNOMED 

Code(s): 11451945


   Comment: 


- No acute exacerbation   





(8) Lymphedema


Code(s): I89.0 - LYMPHEDEMA, NOT ELSEWHERE CLASSIFIED   SNOMED Code(s): 

862773382


   Comment: 


- Chronic,  Likely related to RV and diastolic failure. 


- Continue compression and diuresis.   





(9) Diabetes


Code(s): E11.9 - TYPE 2 DIABETES MELLITUS WITHOUT COMPLICATIONS   SNOMED Code(s)

: 48833794


   Comment: 


- IDDM.  


- HgbA1c 9.7%.  


- 's today.  


- Continue lantus with mealtime Humalog coverage.   





(10) Hyperlipidemia


Code(s): E78.5 - HYPERLIPIDEMIA, UNSPECIFIED   SNOMED Code(s): 73342744


   Comment: 


- Continue statin   





(11) HTN (hypertension)


Code(s): I10 - ESSENTIAL (PRIMARY) HYPERTENSION   SNOMED Code(s): 37519343


   Comment: 


- BP well controlled. 


- Continue lisinopril and atenolol.   





(12) Morbid obesity


Code(s): E66.01 - MORBID (SEVERE) OBESITY DUE TO EXCESS CALORIES   SNOMED Code(s

): 481385495


   Comment: 


- BMI 50.  


- Bariatric surgery within the last 12 months with 65# weight loss   





(13) DVT prophylaxis


Code(s): GHV7149 -    SNOMED Code(s): 125917170


   Comment: 


- HSQ   





(14) Full code status


Code(s): Z78.9 - OTHER SPECIFIED HEALTH STATUS   SNOMED Code(s): 793160895


   


Status and Disposition: 





Inpatient.  Pt will likely need rehab.

## 2017-06-14 LAB
ANION GAP SERPL CALC-SCNC: 3 MMOL/L (ref 2–11)
BUN SERPL-MCNC: 17 MG/DL (ref 6–24)
BUN/CREAT SERPL: 20.7 (ref 8–20)
CALCIUM SERPL-MCNC: 9.1 MG/DL (ref 8.6–10.3)
CHLORIDE SERPL-SCNC: 89 MMOL/L (ref 101–111)
GLUCOSE SERPL-MCNC: 206 MG/DL (ref 70–100)
HCO3 SERPL-SCNC: 43 MMOL/L (ref 22–32)
HCT VFR BLD AUTO: 33 % (ref 42–52)
HGB BLD-MCNC: 10.4 G/DL (ref 14–18)
MCH RBC QN AUTO: 24 PG (ref 27–31)
MCHC RBC AUTO-ENTMCNC: 31 G/DL (ref 31–36)
MCV RBC AUTO: 78 FL (ref 80–94)
POTASSIUM SERPL-SCNC: 3.9 MMOL/L (ref 3.5–5)
RBC # BLD AUTO: 4.28 10^6/UL (ref 4–5.4)
SODIUM SERPL-SCNC: 135 MMOL/L (ref 133–145)
WBC # BLD AUTO: 7 10^3/UL (ref 3.5–10.8)

## 2017-06-14 RX ADMIN — CETIRIZINE HYDROCHLORIDE SCH MG: 10 TABLET, FILM COATED ORAL at 08:44

## 2017-06-14 RX ADMIN — INSULIN LISPRO SCH UNIT: 100 INJECTION, SOLUTION INTRAVENOUS; SUBCUTANEOUS at 18:00

## 2017-06-14 RX ADMIN — INSULIN LISPRO SCH UNITS: 100 INJECTION, SOLUTION INTRAVENOUS; SUBCUTANEOUS at 17:59

## 2017-06-14 RX ADMIN — HEPARIN SODIUM SCH UNITS: 5000 INJECTION INTRAVENOUS; SUBCUTANEOUS at 06:07

## 2017-06-14 RX ADMIN — Medication SCH CAP: at 08:43

## 2017-06-14 RX ADMIN — INSULIN GLARGINE SCH UNITS: 100 INJECTION, SOLUTION SUBCUTANEOUS at 21:00

## 2017-06-14 RX ADMIN — PREGABALIN SCH MG: 25 CAPSULE ORAL at 20:58

## 2017-06-14 RX ADMIN — ACETAMINOPHEN SCH MG: 325 TABLET ORAL at 08:44

## 2017-06-14 RX ADMIN — ACETAMINOPHEN SCH MG: 325 TABLET ORAL at 02:55

## 2017-06-14 RX ADMIN — OXYCODONE HYDROCHLORIDE PRN MG: 5 CAPSULE ORAL at 08:38

## 2017-06-14 RX ADMIN — INSULIN GLARGINE SCH UNIT: 100 INJECTION, SOLUTION SUBCUTANEOUS at 10:29

## 2017-06-14 RX ADMIN — Medication SCH: at 17:51

## 2017-06-14 RX ADMIN — INSULIN LISPRO SCH UNIT: 100 INJECTION, SOLUTION INTRAVENOUS; SUBCUTANEOUS at 13:48

## 2017-06-14 RX ADMIN — NYSTATIN SCH APPLIC: 100000 POWDER TOPICAL at 21:14

## 2017-06-14 RX ADMIN — AMLODIPINE BESYLATE SCH MG: 5 TABLET ORAL at 20:58

## 2017-06-14 RX ADMIN — HEPARIN SODIUM SCH UNITS: 5000 INJECTION INTRAVENOUS; SUBCUTANEOUS at 22:02

## 2017-06-14 RX ADMIN — Medication SCH ML: at 23:45

## 2017-06-14 RX ADMIN — OXYCODONE HYDROCHLORIDE SCH MG: 10 TABLET, FILM COATED, EXTENDED RELEASE ORAL at 08:44

## 2017-06-14 RX ADMIN — ATORVASTATIN CALCIUM SCH MG: 80 TABLET, FILM COATED ORAL at 08:43

## 2017-06-14 RX ADMIN — ATENOLOL SCH MG: 50 TABLET ORAL at 08:43

## 2017-06-14 RX ADMIN — OMEPRAZOLE SCH MG: 20 CAPSULE, DELAYED RELEASE ORAL at 08:43

## 2017-06-14 RX ADMIN — OXYCODONE HYDROCHLORIDE PRN MG: 5 CAPSULE ORAL at 21:12

## 2017-06-14 RX ADMIN — NYSTATIN SCH: 100000 POWDER TOPICAL at 12:06

## 2017-06-14 RX ADMIN — HEPARIN SODIUM SCH UNITS: 5000 INJECTION INTRAVENOUS; SUBCUTANEOUS at 13:47

## 2017-06-14 RX ADMIN — Medication SCH: at 10:20

## 2017-06-14 RX ADMIN — Medication SCH CAP: at 20:59

## 2017-06-14 RX ADMIN — OXYCODONE HYDROCHLORIDE PRN MG: 5 CAPSULE ORAL at 17:01

## 2017-06-14 RX ADMIN — OXYCODONE HYDROCHLORIDE PRN MG: 5 CAPSULE ORAL at 12:42

## 2017-06-14 RX ADMIN — OXYCODONE HYDROCHLORIDE SCH MG: 10 TABLET, FILM COATED, EXTENDED RELEASE ORAL at 20:58

## 2017-06-14 RX ADMIN — PREGABALIN SCH MG: 25 CAPSULE ORAL at 08:42

## 2017-06-14 RX ADMIN — LISINOPRIL SCH MG: 10 TABLET ORAL at 08:43

## 2017-06-14 RX ADMIN — FUROSEMIDE SCH MG: 20 TABLET ORAL at 08:44

## 2017-06-14 RX ADMIN — INSULIN LISPRO SCH UNIT: 100 INJECTION, SOLUTION INTRAVENOUS; SUBCUTANEOUS at 10:35

## 2017-06-14 RX ADMIN — OXYCODONE HYDROCHLORIDE PRN MG: 5 CAPSULE ORAL at 04:40

## 2017-06-14 NOTE — PN
Subjective


Date of Service: 06/14/17


Interval History: 





Pt states the oxycodone helps his pain but takes about 1hr to start working and 

he states the pain is still quite significant despite having the pain 

medication. He has been having diarrhea today and yesterday. He requests 

imodium. He states at home he will take a probiotic and yogurt when he has 

diarrhea. He denies any SOB or CP. 


Family History: Unchanged from Admission


Social History: Unchanged from Admission


Past Medical History: Unchanged from Admission





Objective


Active Medications: 








Al Hydrox/Mg Hydrox/Simethicone (Maalox Plus*)  30 ml PO Q6H PRN


   PRN Reason: INDIGESTION


Amlodipine Besylate (Norvasc Tab*)  5 mg PO BEDTIME [facility]


   Last Admin: 06/13/17 21:35 Dose:  5 mg


Atenolol (Tenormin Tab*)  50 mg PO QAM [facility]


   Last Admin: 06/14/17 08:43 Dose:  50 mg


Atorvastatin Calcium (Lipitor*)  80 mg PO DAILY [facility]


   Last Admin: 06/14/17 08:43 Dose:  80 mg


Calcium Carbonate (Tums*)  1,000 mg PO Q4H PRN


   PRN Reason: reflux


   Last Admin: 06/04/17 05:16 Dose:  1,000 mg


Cetirizine HCl (Zyrtec*)  10 mg PO DAILY [facility]


   PRN Reason: Protocol


   Last Admin: 06/14/17 08:44 Dose:  10 mg


Dextrose (D50w Syringe 50 Ml*)  12.5 gm IV PUSH .FOR FS < 60 - SS PRN


   PRN Reason: FS < 60


Furosemide (Lasix Tab*)  20 mg PO DAILY [facility]


   Last Admin: 06/14/17 08:44 Dose:  20 mg


Heparin Sodium (Porcine) (Heparin Vial(*))  5,000 units SUBCUT Q8HR [facility]


   Last Admin: 06/14/17 13:47 Dose:  5,000 units


Heparin Sodium (Porcine) (Heparin Flush Picc/Ml/Cvc(*))  1 ml FLUSH 0600,1800 

[facility]


   PRN Reason: Protocol


   Last Admin: 06/14/17 10:20 Dose:  Not Given


Hydromorphone HCl (Dilaudid Iv*)  0.5 mg IV SLOW PU Q4H PRN


   PRN Reason: PAIN - BREAKTHROUGH


   Last Admin: 06/14/17 07:06 Dose:  0.5 mg


Sodium Chloride (Ns 0.9% 500 Ml Bag*)  500 mls @ 0 mls/hr IV KVO [facility]


   PRN Reason: KVO


Piperacillin Sod/Tazobactam (Sod 3.375 gm/ Sodium Chloride)  100 mls @ 25 mls/

hr IVPB Q8H [facility]


   Last Admin: 06/14/17 10:29 Dose:  25 mls/hr


Insulin Glargine (Lantus(*))  60 units SUBCUT BID [facility]


   Last Admin: 06/14/17 10:29 Dose:  60 unit


Insulin Human Lispro (Humalog*)  0 units SUBCUT AC [facility]


   PRN Reason: Protocol


   Last Admin: 06/14/17 13:48 Dose:  12 unit


Lactobacillus Rhamnosus (Culturelle*)  1 cap PO BID [facility]


   Last Admin: 06/14/17 08:43 Dose:  1 cap


Lisinopril (Prinivil Tab*)  30 mg PO DAILY [facility]


   Last Admin: 06/14/17 08:43 Dose:  30 mg


Loperamide HCl (Imodium Cap*)  2 mg PO .SEE DIRECTIONS PRN


   PRN Reason: DIARRHEA


Melatonin (Melatonin (Nf))  3 mg PO BEDTIME PRN


   PRN Reason: INSOMNIA


Nystatin (Nystatin Top Powder*)  1 applic TOPICAL BID [facility]


   Last Admin: 06/14/17 12:06 Dose:  Not Given


Omeprazole (Prilosec Cap*)  20 mg PO DAILY@0730 [facility]


   Last Admin: 06/14/17 08:43 Dose:  20 mg


Ondansetron HCl (Zofran Inj*)  4 mg IV Q4H PRN


   PRN Reason: NAUSEA/VOMITING


   Last Admin: 06/04/17 02:01 Dose:  4 mg


Oxycodone HCl (Oxycontin(*))  30 mg PO 0900,2100 [facility]


   Last Admin: 06/14/17 08:44 Dose:  30 mg


Oxycodone HCl (Roxycodone Tab*)  15 mg PO Q4H PRN


   PRN Reason: PAIN


   Last Admin: 06/14/17 12:42 Dose:  15 mg


Pregabalin (Lyrica Cap(*))  75 mg PO BID [facility]


   Last Admin: 06/14/17 08:42 Dose:  75 mg








 Vital Signs











  06/13/17 06/13/17 06/13/17





  18:02 18:05 20:00


 


Temperature   


 


Pulse Rate   


 


Respiratory  16 18





Rate   


 


Blood Pressure   





(mmHg)   


 


O2 Sat by Pulse 96  





Oximetry   














  06/13/17 06/13/17 06/13/17





  20:05 21:34 23:19


 


Temperature   98.4 F


 


Pulse Rate   75


 


Respiratory 20 20 16





Rate   


 


Blood Pressure   132/69





(mmHg)   


 


O2 Sat by Pulse   90





Oximetry   














  06/13/17 06/13/17 06/13/17





  23:20 23:23 23:34


 


Temperature   


 


Pulse Rate   


 


Respiratory 20 20 20





Rate   


 


Blood Pressure   





(mmHg)   


 


O2 Sat by Pulse   





Oximetry   














  06/14/17 06/14/17 06/14/17





  01:23 02:23 04:05


 


Temperature   98.0 F


 


Pulse Rate   75


 


Respiratory 16  18





Rate   


 


Blood Pressure   135/61





(mmHg)   


 


O2 Sat by Pulse  98 94





Oximetry   














  06/14/17 06/14/17 06/14/17





  04:40 07:00 07:06


 


Temperature   


 


Pulse Rate   


 


Respiratory 18 16 22





Rate   


 


Blood Pressure   





(mmHg)   


 


O2 Sat by Pulse   





Oximetry   














  06/14/17 06/14/17 06/14/17





  07:24 08:06 08:18


 


Temperature 97.9 F  


 


Pulse Rate 77  


 


Respiratory 18 16 





Rate   


 


Blood Pressure 130/75  





(mmHg)   


 


O2 Sat by Pulse 93  92





Oximetry   














  06/14/17 06/14/17 06/14/17





  08:35 08:38 08:42


 


Temperature   


 


Pulse Rate   


 


Respiratory 16 16 16





Rate   


 


Blood Pressure   





(mmHg)   


 


O2 Sat by Pulse 92  





Oximetry   














  06/14/17 06/14/17 06/14/17





  08:44 10:38 10:42


 


Temperature   


 


Pulse Rate   


 


Respiratory 16 16 18





Rate   


 


Blood Pressure   





(mmHg)   


 


O2 Sat by Pulse   





Oximetry   














  06/14/17 06/14/17 06/14/17





  10:44 12:42 13:55


 


Temperature   98.2 F


 


Pulse Rate   80


 


Respiratory 18 16 16





Rate   


 


Blood Pressure   114/52





(mmHg)   


 


O2 Sat by Pulse   94





Oximetry   














  06/14/17 06/14/17





  14:42 15:37


 


Temperature  98.1 F


 


Pulse Rate  72


 


Respiratory 16 18





Rate  


 


Blood Pressure  124/68





(mmHg)  


 


O2 Sat by Pulse  91





Oximetry  











Oxygen Devices in Use Now: Nasal Cannula - 91%


Appearance: Obese middle aged male sitting up in bed, NAD


Eyes: No Scleral Icterus


Ears/Nose/Mouth/Throat: Mucous Membranes Moist


Respiratory: Symmetrical Chest Expansion and Respiratory Effort, Clear to 

Auscultation - anteriorly


Cardiovascular: NL Sounds; No Murmurs; No JVD, RRR, No Edema


Abdominal: NL Sounds; No Tenderness; No Distention


Extremities: No Clubbing, Cyanosis, - - s/ L BKA-stump in dressing


Skin: No Rash or Ulcers, - - R leg wrapped with ACE- old blister noted on 

medial aspect of R 1st toe- no erythema


Neurological: Alert and Oriented x 3


Result Diagrams: 


 06/14/17 06:05





 06/14/17 06:05


Microbiology and Other Data: 


 Microbiology











 05/31/17 07:54 Aerobic Blood Culture - Preliminary





 Blood Venous    No Growth Day 1





 Anaerobic Blood Culture - Preliminary





    No Growth Day 1


 


 05/30/17 23:34 Aerobic Blood Culture - Preliminary





 Blood Venous    No Growth Day 1





 Anaerobic Blood Culture - Preliminary





    No Growth Day 1











Diagnostic Imaging: 





Echo 5/8/17 - LVEF 55-60%, mod LVH, diastolic dysfunction, mild to mod reduced 

RV function, unable to assess RV function





MRI L ankle - osteomyelitis of the L calcaneus that looks to have significantly 

advanced when compared to 3/2017





MRI R foot - Moderate degree of subcutaneous edema. No evidence of bone marrow 

edema or replacement to suggest osteomyelitis is present.











Assess/Plan/Problems-Billing





Mr. Katz is a 60 yo gentleman with known osteomyelitis of L calcaneus, poorly 

controlled diabetes, chronic RV failure and diastolic dysfunction with 

significant LE edema as well as untreated DAVID, HTN, COPD, morbid obesity and 

chronic pain who was referred to the hospital by Dr Kuhn.





 





- Patient Problems


(1) Osteomyelitis


Current Visit: Yes   Status: Acute   Code(s): M86.9 - OSTEOMYELITIS, 

UNSPECIFIED   SNOMED Code(s): 98854822


   Comment: s/p L BKA 6/5/17. He is currently a emmanuelle lift and will need STR. 

Continue zosyn per Dr. Mejia.    





(2) Urinary retention


Current Visit: Yes   Status: Acute   Code(s): R33.9 - RETENTION OF URINE, 

UNSPECIFIED   SNOMED Code(s): 549978455


   Comment: The patient has a diop in place. Will need a voiding trial in next 

few days.    





(3) Diabetes


Current Visit: Yes   Status: Acute   Priority: High   Code(s): E11.9 - TYPE 2 

DIABETES MELLITUS WITHOUT COMPLICATIONS   SNOMED Code(s): 27568135


   Comment: Sugars are uncontrolled on his current regimen. Increase lantus to 

65 units BID and add lispro 5 units with meals in addition to sliding scale. 

Will likely need many more adjustments to get his sugars controlled.    





(4) Chronic pain


Current Visit: Yes   Status: Chronic   Code(s): G89.29 - OTHER CHRONIC PAIN   

SNOMED Code(s): 04596510


   Comment: Increase oxycodone to 20mg q4hr prn and continue oxycontin 30mg 

BID. Monitor for improvement in his pain.    





(5) HTN (hypertension)


Current Visit: Yes   Status: Chronic   Code(s): I10 - ESSENTIAL (PRIMARY) 

HYPERTENSION   SNOMED Code(s): 42919989


   Comment: BP is under good control on lisinopril and atenolol.    





(6) DAVID (obstructive sleep apnea)


Current Visit: Yes   Status: Acute   Code(s): G47.33 - OBSTRUCTIVE SLEEP APNEA (

ADULT) (PEDIATRIC)   SNOMED Code(s): 19524819


   Comment: Pt's wife is going to bring in his home CPAP which he can tolerate 

better than our machine.    





(7) COPD (chronic obstructive pulmonary disease)


Current Visit: Yes   Status: Acute   Code(s): J44.9 - CHRONIC OBSTRUCTIVE 

PULMONARY DISEASE, UNSPECIFIED   SNOMED Code(s): 26971780


   Comment: No signs of exacerbation.    





(8) Hyperlipidemia


Current Visit: Yes   Status: Acute   Code(s): E78.5 - HYPERLIPIDEMIA, 

UNSPECIFIED   SNOMED Code(s): 76626740


   Comment: Continue statin.   





(9) DVT prophylaxis


Current Visit: Yes   Status: Acute   Code(s): XRA6897 -    SNOMED Code(s): 

402164202


   Comment: 


- HSQ   





(10) Full code status


Current Visit: Yes   Status: Acute   Code(s): Z78.9 - OTHER SPECIFIED HEALTH 

STATUS   SNOMED Code(s): 283308856


   


Status and Disposition: 





Await rehab bed offer

## 2017-06-14 NOTE — PN
PROGRESS NOTE:

 

DATE OF SERVICE:  06/14/17

 

HISTORY:  Shane is recovering from his left transtibial amputation.  The 
right lower extremity swelling has resolved significantly and of course now has 
dried up as his edema and weeping has improved.  His left leg, which smelled 
like pseudomonas 48 hours ago, has been receiving daily Betadine dressing 
changes, and the swelling has been diminished as well in the left proximal calf 
and the wound itself much less tender. I have changed his dressing today and 
the drainage has diminished as well.  There is none of the pseudomonas 
character.  He is going to have Betadine b.i.d. dressing change with Ace wrap, 
and continue his IV antibiotics.

 

 [id number]/CPS #: [id number]

MYA

## 2017-06-15 PROCEDURE — 0TPBX0Z REMOVAL OF DRAINAGE DEVICE FROM BLADDER, EXTERNAL APPROACH: ICD-10-PCS | Performed by: HOSPITALIST

## 2017-06-15 RX ADMIN — INSULIN GLARGINE SCH UNITS: 100 INJECTION, SOLUTION SUBCUTANEOUS at 08:47

## 2017-06-15 RX ADMIN — Medication SCH ML: at 06:50

## 2017-06-15 RX ADMIN — INSULIN LISPRO SCH UNITS: 100 INJECTION, SOLUTION INTRAVENOUS; SUBCUTANEOUS at 08:46

## 2017-06-15 RX ADMIN — HEPARIN SODIUM SCH UNITS: 5000 INJECTION INTRAVENOUS; SUBCUTANEOUS at 05:42

## 2017-06-15 RX ADMIN — Medication SCH ML: at 14:52

## 2017-06-15 RX ADMIN — AMLODIPINE BESYLATE SCH MG: 5 TABLET ORAL at 21:08

## 2017-06-15 RX ADMIN — INSULIN GLARGINE SCH UNITS: 100 INJECTION, SOLUTION SUBCUTANEOUS at 21:08

## 2017-06-15 RX ADMIN — Medication SCH CAP: at 21:08

## 2017-06-15 RX ADMIN — PREGABALIN SCH MG: 25 CAPSULE ORAL at 21:08

## 2017-06-15 RX ADMIN — Medication SCH CAP: at 08:40

## 2017-06-15 RX ADMIN — OXYCODONE HYDROCHLORIDE SCH MG: 10 TABLET, FILM COATED, EXTENDED RELEASE ORAL at 21:09

## 2017-06-15 RX ADMIN — OXYCODONE HYDROCHLORIDE PRN MG: 5 CAPSULE ORAL at 08:39

## 2017-06-15 RX ADMIN — Medication SCH: at 18:15

## 2017-06-15 RX ADMIN — OXYCODONE HYDROCHLORIDE PRN MG: 5 CAPSULE ORAL at 22:23

## 2017-06-15 RX ADMIN — INSULIN LISPRO SCH UNITS: 100 INJECTION, SOLUTION INTRAVENOUS; SUBCUTANEOUS at 18:01

## 2017-06-15 RX ADMIN — OXYCODONE HYDROCHLORIDE SCH MG: 10 TABLET, FILM COATED, EXTENDED RELEASE ORAL at 08:44

## 2017-06-15 RX ADMIN — FUROSEMIDE SCH MG: 20 TABLET ORAL at 08:44

## 2017-06-15 RX ADMIN — INSULIN LISPRO SCH UNIT: 100 INJECTION, SOLUTION INTRAVENOUS; SUBCUTANEOUS at 13:12

## 2017-06-15 RX ADMIN — INSULIN LISPRO SCH UNIT: 100 INJECTION, SOLUTION INTRAVENOUS; SUBCUTANEOUS at 17:56

## 2017-06-15 RX ADMIN — Medication SCH ML: at 22:30

## 2017-06-15 RX ADMIN — CETIRIZINE HYDROCHLORIDE SCH MG: 10 TABLET, FILM COATED ORAL at 09:10

## 2017-06-15 RX ADMIN — HEPARIN SODIUM SCH UNITS: 5000 INJECTION INTRAVENOUS; SUBCUTANEOUS at 22:21

## 2017-06-15 RX ADMIN — OMEPRAZOLE SCH MG: 20 CAPSULE, DELAYED RELEASE ORAL at 08:41

## 2017-06-15 RX ADMIN — INSULIN LISPRO SCH UNIT: 100 INJECTION, SOLUTION INTRAVENOUS; SUBCUTANEOUS at 08:45

## 2017-06-15 RX ADMIN — OXYCODONE HYDROCHLORIDE PRN MG: 5 CAPSULE ORAL at 04:20

## 2017-06-15 RX ADMIN — OXYCODONE HYDROCHLORIDE PRN MG: 5 CAPSULE ORAL at 13:13

## 2017-06-15 RX ADMIN — LOPERAMIDE HYDROCHLORIDE PRN MG: 2 CAPSULE ORAL at 18:03

## 2017-06-15 RX ADMIN — INSULIN GLARGINE SCH: 100 INJECTION, SOLUTION SUBCUTANEOUS at 09:08

## 2017-06-15 RX ADMIN — NYSTATIN SCH APPLIC: 100000 POWDER TOPICAL at 21:13

## 2017-06-15 RX ADMIN — LISINOPRIL SCH MG: 10 TABLET ORAL at 08:43

## 2017-06-15 RX ADMIN — ATENOLOL SCH MG: 50 TABLET ORAL at 08:42

## 2017-06-15 RX ADMIN — OXYCODONE HYDROCHLORIDE PRN MG: 5 CAPSULE ORAL at 17:57

## 2017-06-15 RX ADMIN — NYSTATIN SCH APPLIC: 100000 POWDER TOPICAL at 10:43

## 2017-06-15 RX ADMIN — INSULIN LISPRO SCH UNITS: 100 INJECTION, SOLUTION INTRAVENOUS; SUBCUTANEOUS at 13:13

## 2017-06-15 RX ADMIN — HEPARIN SODIUM SCH UNITS: 5000 INJECTION INTRAVENOUS; SUBCUTANEOUS at 13:14

## 2017-06-15 RX ADMIN — ATORVASTATIN CALCIUM SCH MG: 80 TABLET, FILM COATED ORAL at 08:41

## 2017-06-15 NOTE — PN
Subjective


Date of Service: 06/15/17


Interval History: 





Pt is feeling ok. He just got to sitting on the edge of the bed for the first 

time with PT. He states he was having severe pain in his stump with it 

dangling. He states he has not had any burning/tingling pain in his stump/leg. 

He has not had a BM yet today but feels like he needs to soon. No SOB.  


Family History: Unchanged from Admission


Social History: Unchanged from Admission


Past Medical History: Unchanged from Admission





Objective


Active Medications: 








Al Hydrox/Mg Hydrox/Simethicone (Maalox Plus*)  30 ml PO Q6H PRN


   PRN Reason: INDIGESTION


Amlodipine Besylate (Norvasc Tab*)  5 mg PO BEDTIME [facility]


   Last Admin: 06/14/17 20:58 Dose:  5 mg


Atenolol (Tenormin Tab*)  50 mg PO QAM [facility]


   Last Admin: 06/14/17 08:43 Dose:  50 mg


Atorvastatin Calcium (Lipitor*)  80 mg PO DAILY [facility]


   Last Admin: 06/14/17 08:43 Dose:  80 mg


Calcium Carbonate (Tums*)  1,000 mg PO Q4H PRN


   PRN Reason: reflux


   Last Admin: 06/04/17 05:16 Dose:  1,000 mg


Cetirizine HCl (Zyrtec*)  10 mg PO DAILY [facility]


   PRN Reason: Protocol


   Last Admin: 06/14/17 08:44 Dose:  10 mg


Dextrose (D50w Syringe 50 Ml*)  12.5 gm IV PUSH .FOR FS < 60 - SS PRN


   PRN Reason: FS < 60


Dextrose (D50w Syringe 50 Ml*)  12.5 gm IV PUSH .FOR FS < 60 - SS PRN


   PRN Reason: FS < 60


Furosemide (Lasix Tab*)  20 mg PO DAILY [facility]


   Last Admin: 06/14/17 08:44 Dose:  20 mg


Heparin Sodium (Porcine) (Heparin Vial(*))  5,000 units SUBCUT Q8HR [facility]


   Last Admin: 06/15/17 05:42 Dose:  5,000 units


Heparin Sodium (Porcine) (Heparin Flush Picc/Ml/Cvc(*))  1 ml FLUSH 0600,1800 

[facility]


   PRN Reason: Protocol


   Last Admin: 06/15/17 06:50 Dose:  1 ml


Sodium Chloride (Ns 0.9% 500 Ml Bag*)  500 mls @ 0 mls/hr IV KVO [facility]


   PRN Reason: KVO


Piperacillin Sod/Tazobactam (Sod 3.375 gm/ Sodium Chloride)  100 mls @ 25 mls/

hr IVPB Q8H [facility]


   Last Admin: 06/15/17 01:45 Dose:  25 mls/hr


Insulin Glargine (Lantus(*))  68 units SUBCUT BID [facility]


Insulin Human Lispro (Humalog*)  0 units SUBCUT AC [facility]


   PRN Reason: Protocol


   Last Admin: 06/14/17 18:00 Dose:  12 unit


Insulin Human Lispro (Humalog*)  5 units SUBCUT AC [facility]


   Last Admin: 06/14/17 17:59 Dose:  5 units


Lactobacillus Rhamnosus (Culturelle*)  1 cap PO BID [facility]


   Last Admin: 06/14/17 20:59 Dose:  1 cap


Lisinopril (Prinivil Tab*)  30 mg PO DAILY [facility]


   Last Admin: 06/14/17 08:43 Dose:  30 mg


Loperamide HCl (Imodium Cap*)  2 mg PO .SEE DIRECTIONS PRN


   PRN Reason: DIARRHEA


Melatonin (Melatonin (Nf))  3 mg PO BEDTIME PRN


   PRN Reason: INSOMNIA


Nystatin (Nystatin Top Powder*)  1 applic TOPICAL BID [facility]


   Last Admin: 06/14/17 21:14 Dose:  1 applic


Omeprazole (Prilosec Cap*)  20 mg PO DAILY@0730 [facility]


   Last Admin: 06/14/17 08:43 Dose:  20 mg


Ondansetron HCl (Zofran Inj*)  4 mg IV Q4H PRN


   PRN Reason: NAUSEA/VOMITING


   Last Admin: 06/04/17 02:01 Dose:  4 mg


Oxycodone HCl (Oxycontin(*))  30 mg PO 0900,2100 [facility]


   Last Admin: 06/14/17 20:58 Dose:  30 mg


Oxycodone HCl (Roxycodone Tab*)  20 mg PO Q4H PRN


   PRN Reason: PAIN


   Last Admin: 06/15/17 04:20 Dose:  20 mg


Pregabalin (Lyrica Cap(*))  100 mg PO BID [facility]








 Vital Signs











  06/14/17 06/14/17 06/14/17





  10:38 10:42 10:44


 


Temperature   


 


Pulse Rate   


 


Respiratory 16 18 18





Rate   


 


Blood Pressure   





(mmHg)   


 


O2 Sat by Pulse   





Oximetry   














  06/14/17 06/14/17 06/14/17





  12:42 13:55 14:42


 


Temperature  98.2 F 


 


Pulse Rate  80 


 


Respiratory 16 16 16





Rate   


 


Blood Pressure  114/52 





(mmHg)   


 


O2 Sat by Pulse  94 





Oximetry   














  06/14/17 06/14/17 06/14/17





  15:37 17:01 19:01


 


Temperature 98.1 F  


 


Pulse Rate 72  


 


Respiratory 18 16 17





Rate   


 


Blood Pressure 124/68  





(mmHg)   


 


O2 Sat by Pulse 91  





Oximetry   














  06/14/17 06/14/17 06/14/17





  20:00 20:15 20:58


 


Temperature  98.0 F 


 


Pulse Rate  72 


 


Respiratory 17 18 17





Rate   


 


Blood Pressure  112/60 





(mmHg)   


 


O2 Sat by Pulse  91 





Oximetry   














  06/14/17 06/14/17 06/14/17





  21:12 22:00 23:00


 


Temperature   


 


Pulse Rate   


 


Respiratory 17 17 16





Rate   


 


Blood Pressure   





(mmHg)   


 


O2 Sat by Pulse   





Oximetry   














  06/14/17 06/14/17 06/14/17





  23:05 23:10 23:12


 


Temperature   


 


Pulse Rate   


 


Respiratory 16 16 18





Rate   


 


Blood Pressure   





(mmHg)   


 


O2 Sat by Pulse   





Oximetry   














  06/14/17 06/15/17 06/15/17





  23:44 01:55 04:02


 


Temperature 98.0 F  98.4 F


 


Pulse Rate 70  70


 


Respiratory 18  20





Rate   


 


Blood Pressure 147/77  125/64





(mmHg)   


 


O2 Sat by Pulse 93 93 94





Oximetry   














  06/15/17 06/15/17 06/15/17





  04:20 06:20 07:45


 


Temperature   98.2 F


 


Pulse Rate   75


 


Respiratory 18 20 16





Rate   


 


Blood Pressure   134/70





(mmHg)   


 


O2 Sat by Pulse   94





Oximetry   














  06/15/17





  07:56


 


Temperature 


 


Pulse Rate 


 


Respiratory 20





Rate 


 


Blood Pressure 





(mmHg) 


 


O2 Sat by Pulse 





Oximetry 











Oxygen Devices in Use Now: Nasal Cannula - 94%-2L


Appearance: Middle aged obese male sitting on the edge of the bed, NAD


Eyes: No Scleral Icterus


Ears/Nose/Mouth/Throat: Mucous Membranes Moist


Respiratory: Symmetrical Chest Expansion and Respiratory Effort, Clear to 

Auscultation


Cardiovascular: NL Sounds; No Murmurs; No JVD, RRR, No Edema


Abdominal: NL Sounds; No Tenderness; No Distention


Extremities: No Clubbing, Cyanosis, - - s/p L BKA, R lower leg in ACE wrap


Skin: No Rash or Ulcers, No Nodules or Sclerosis


Neurological: Alert and Oriented x 3


Result Diagrams: 


 06/14/17 06:05





 06/14/17 06:05


Microbiology and Other Data: 


 Microbiology











 05/31/17 07:54 Aerobic Blood Culture - Preliminary





 Blood Venous    No Growth Day 1





 Anaerobic Blood Culture - Preliminary





    No Growth Day 1


 


 05/30/17 23:34 Aerobic Blood Culture - Preliminary





 Blood Venous    No Growth Day 1





 Anaerobic Blood Culture - Preliminary





    No Growth Day 1











Diagnostic Imaging: 





Echo 5/8/17 - LVEF 55-60%, mod LVH, diastolic dysfunction, mild to mod reduced 

RV function, unable to assess RV function





MRI L ankle - osteomyelitis of the L calcaneus that looks to have significantly 

advanced when compared to 3/2017





MRI R foot - Moderate degree of subcutaneous edema. No evidence of bone marrow 

edema or replacement to suggest osteomyelitis is present.











Assess/Plan/Problems-Billing





Mr. Katz is a 62 yo gentleman with known osteomyelitis of L calcaneus, poorly 

controlled diabetes, chronic RV failure and diastolic dysfunction with 

significant LE edema as well as untreated DAVID, HTN, COPD, morbid obesity and 

chronic pain who was referred to the hospital by Dr Kuhn.





 





- Patient Problems


(1) Osteomyelitis


Current Visit: Yes   Status: Acute   Code(s): M86.9 - OSTEOMYELITIS, 

UNSPECIFIED   SNOMED Code(s): 15707673


   Comment: s/p L BKA 6/5/17. He is currently a emmanuelle lift and will need STR. 

Continue zosyn per Dr. Mejia-trying to determine how long he needs the IV 

Abx as he is otherwise ready for d/c to rehab.    





(2) Urinary retention


Current Visit: Yes   Status: Acute   Code(s): R33.9 - RETENTION OF URINE, 

UNSPECIFIED   SNOMED Code(s): 051143352


   Comment: Will attempt a voiding trial today. He understands that if is 

unable to urinate on his own the diop will need to be replaced.    





(3) Diabetes


Current Visit: Yes   Status: Acute   Priority: High   Code(s): E11.9 - TYPE 2 

DIABETES MELLITUS WITHOUT COMPLICATIONS   SNOMED Code(s): 52151728


   Comment: This AM his sugar is slightly better. Will increase lantus to 68 

units SQ BID and continue lispro 5 units with meals and the lispro sliding 

scale. Continue to monitor.    





(4) Chronic pain


Current Visit: Yes   Status: Chronic   Code(s): G89.29 - OTHER CHRONIC PAIN   

SNOMED Code(s): 88234111


   Comment: Continue oxycodone 20mg q4hr prn and oxycontin 30mg BID. He states 

his pain is somewhat better than it has been.    





(5) HTN (hypertension)


Current Visit: Yes   Status: Chronic   Code(s): I10 - ESSENTIAL (PRIMARY) 

HYPERTENSION   SNOMED Code(s): 99713091


   Comment: BP is under good control on lisinopril and atenolol.    





(6) DAVID (obstructive sleep apnea)


Current Visit: Yes   Status: Acute   Code(s): G47.33 - OBSTRUCTIVE SLEEP APNEA (

ADULT) (PEDIATRIC)   SNOMED Code(s): 95522377


   Comment: Continue home CPAP.   





(7) COPD (chronic obstructive pulmonary disease)


Current Visit: Yes   Status: Acute   Code(s): J44.9 - CHRONIC OBSTRUCTIVE 

PULMONARY DISEASE, UNSPECIFIED   SNOMED Code(s): 76197317


   Comment: No signs of exacerbation.    





(8) Hyperlipidemia


Current Visit: Yes   Status: Acute   Code(s): E78.5 - HYPERLIPIDEMIA, 

UNSPECIFIED   SNOMED Code(s): 71032880


   Comment: Continue statin.   





(9) DVT prophylaxis


Current Visit: Yes   Status: Acute   Code(s): TAN0753 -    SNOMED Code(s): 

603953786


   Comment: SQ heparin   





(10) Full code status


Current Visit: Yes   Status: Acute   Code(s): Z78.9 - OTHER SPECIFIED HEALTH 

STATUS   SNOMED Code(s): 720934123


   


Status and Disposition: 





Await rehab bed offer

## 2017-06-16 VITALS — SYSTOLIC BLOOD PRESSURE: 115 MMHG | DIASTOLIC BLOOD PRESSURE: 48 MMHG

## 2017-06-16 RX ADMIN — OMEPRAZOLE SCH MG: 20 CAPSULE, DELAYED RELEASE ORAL at 08:56

## 2017-06-16 RX ADMIN — OXYCODONE HYDROCHLORIDE PRN MG: 5 CAPSULE ORAL at 04:25

## 2017-06-16 RX ADMIN — OXYCODONE HYDROCHLORIDE PRN MG: 5 CAPSULE ORAL at 12:52

## 2017-06-16 RX ADMIN — INSULIN LISPRO SCH UNIT: 100 INJECTION, SOLUTION INTRAVENOUS; SUBCUTANEOUS at 08:55

## 2017-06-16 RX ADMIN — Medication SCH CAP: at 10:28

## 2017-06-16 RX ADMIN — HEPARIN SODIUM SCH UNITS: 5000 INJECTION INTRAVENOUS; SUBCUTANEOUS at 06:02

## 2017-06-16 RX ADMIN — LOPERAMIDE HYDROCHLORIDE PRN MG: 2 CAPSULE ORAL at 12:51

## 2017-06-16 RX ADMIN — LISINOPRIL SCH MG: 10 TABLET ORAL at 08:56

## 2017-06-16 RX ADMIN — INSULIN GLARGINE SCH UNITS: 100 INJECTION, SOLUTION SUBCUTANEOUS at 09:02

## 2017-06-16 RX ADMIN — HEPARIN SODIUM SCH: 5000 INJECTION INTRAVENOUS; SUBCUTANEOUS at 13:44

## 2017-06-16 RX ADMIN — FUROSEMIDE SCH MG: 20 TABLET ORAL at 08:56

## 2017-06-16 RX ADMIN — NYSTATIN SCH APPLIC: 100000 POWDER TOPICAL at 11:03

## 2017-06-16 RX ADMIN — ATORVASTATIN CALCIUM SCH MG: 80 TABLET, FILM COATED ORAL at 08:57

## 2017-06-16 RX ADMIN — OXYCODONE HYDROCHLORIDE SCH MG: 10 TABLET, FILM COATED, EXTENDED RELEASE ORAL at 08:57

## 2017-06-16 RX ADMIN — CETIRIZINE HYDROCHLORIDE SCH MG: 10 TABLET, FILM COATED ORAL at 08:57

## 2017-06-16 RX ADMIN — INSULIN LISPRO SCH UNITS: 100 INJECTION, SOLUTION INTRAVENOUS; SUBCUTANEOUS at 08:56

## 2017-06-16 RX ADMIN — Medication SCH: at 06:02

## 2017-06-16 RX ADMIN — INSULIN LISPRO SCH UNIT: 100 INJECTION, SOLUTION INTRAVENOUS; SUBCUTANEOUS at 12:52

## 2017-06-16 RX ADMIN — PREGABALIN SCH MG: 25 CAPSULE ORAL at 08:57

## 2017-06-16 RX ADMIN — ATENOLOL SCH MG: 50 TABLET ORAL at 08:57

## 2017-06-16 RX ADMIN — OXYCODONE HYDROCHLORIDE PRN MG: 5 CAPSULE ORAL at 08:58

## 2017-06-16 NOTE — PN
Subjective


Date of Service: 06/16/17


Interval History: 





Pt is feeling well. He is sitting in a wheel chair. He states if his stump is 

elevated there is minimal pain. With the stump dangling it hurts much more. He 

denies any SOB. No abdominal pain. He is still having mushy stools. 


Family History: Unchanged from Admission


Social History: Unchanged from Admission


Past Medical History: Unchanged from Admission





Objective


Active Medications: 








Al Hydrox/Mg Hydrox/Simethicone (Maalox Plus*)  30 ml PO Q6H PRN


   PRN Reason: INDIGESTION


Amlodipine Besylate (Norvasc Tab*)  5 mg PO BEDTIME Replaced by [facility]


   Last Admin: 06/15/17 21:08 Dose:  5 mg


Atenolol (Tenormin Tab*)  50 mg PO QAM Replaced by [facility]


   Last Admin: 06/16/17 08:57 Dose:  50 mg


Atorvastatin Calcium (Lipitor*)  80 mg PO DAILY Replaced by [facility]


   Last Admin: 06/16/17 08:57 Dose:  80 mg


Calcium Carbonate (Tums*)  1,000 mg PO Q4H PRN


   PRN Reason: reflux


   Last Admin: 06/04/17 05:16 Dose:  1,000 mg


Cetirizine HCl (Zyrtec*)  10 mg PO DAILY Replaced by [facility]


   PRN Reason: Protocol


   Last Admin: 06/16/17 08:57 Dose:  10 mg


Dextrose (D50w Syringe 50 Ml*)  12.5 gm IV PUSH .FOR FS < 60 - SS PRN


   PRN Reason: FS < 60


Furosemide (Lasix Tab*)  20 mg PO DAILY Replaced by [facility]


   Last Admin: 06/16/17 08:56 Dose:  20 mg


Heparin Sodium (Porcine) (Heparin Vial(*))  5,000 units SUBCUT Q8HR Replaced by [facility]


   Last Admin: 06/16/17 06:02 Dose:  5,000 units


Heparin Sodium (Porcine) (Heparin Flush Picc/Ml/Cvc(*))  1 ml FLUSH 0600,1800 

Replaced by [facility]


   PRN Reason: Protocol


   Last Admin: 06/16/17 06:02 Dose:  Not Given


Piperacillin Sod/Tazobactam (Sod 3.375 gm/ Sodium Chloride)  100 mls @ 25 mls/

hr IVPB Q8H Replaced by [facility]


   Last Admin: 06/16/17 10:29 Dose:  25 mls/hr


Insulin Glargine (Lantus(*))  72 units SUBCUT BID Replaced by [facility]


Insulin Human Lispro (Humalog*)  0 units SUBCUT AC Replaced by [facility]


   PRN Reason: Protocol


   Last Admin: 06/16/17 08:55 Dose:  2 unit


Insulin Human Lispro (Humalog*)  7 units SUBCUT AC Replaced by [facility]


Lactobacillus Rhamnosus (Culturelle*)  1 cap PO BID Replaced by [facility]


   Last Admin: 06/16/17 10:28 Dose:  1 cap


Lisinopril (Prinivil Tab*)  30 mg PO DAILY Replaced by [facility]


   Last Admin: 06/16/17 08:56 Dose:  30 mg


Loperamide HCl (Imodium Cap*)  2 mg PO .SEE DIRECTIONS PRN


   PRN Reason: DIARRHEA


   Last Admin: 06/15/17 18:03 Dose:  2 mg


Melatonin (Melatonin (Nf))  3 mg PO BEDTIME PRN


   PRN Reason: INSOMNIA


Nystatin (Nystatin Top Powder*)  1 applic TOPICAL BID Replaced by [facility]


   Last Admin: 06/16/17 11:03 Dose:  1 applic


Omeprazole (Prilosec Cap*)  20 mg PO DAILY@0730 Replaced by [facility]


   Last Admin: 06/16/17 08:56 Dose:  20 mg


Ondansetron HCl (Zofran Inj*)  4 mg IV Q4H PRN


   PRN Reason: NAUSEA/VOMITING


   Last Admin: 06/04/17 02:01 Dose:  4 mg


Oxycodone HCl (Oxycontin(*))  30 mg PO 0900,2100 Replaced by [facility]


   Last Admin: 06/16/17 08:57 Dose:  30 mg


Oxycodone HCl (Roxycodone Tab*)  20 mg PO Q4H PRN


   PRN Reason: PAIN


   Last Admin: 06/16/17 08:58 Dose:  20 mg


Pregabalin (Lyrica Cap(*))  75 mg PO BID Replaced by [facility]


   Last Admin: 06/16/17 08:57 Dose:  75 mg








 Vital Signs











  06/15/17 06/15/17 06/15/17





  13:13 15:13 15:35


 


Temperature   


 


Pulse Rate   80


 


Respiratory 18 18 





Rate   


 


Blood Pressure   155/108





(mmHg)   


 


O2 Sat by Pulse   91





Oximetry   














  06/15/17 06/15/17 06/15/17





  16:21 17:57 18:03


 


Temperature   


 


Pulse Rate 73  


 


Respiratory  19 18





Rate   


 


Blood Pressure 134/75  





(mmHg)   


 


O2 Sat by Pulse   





Oximetry   














  06/15/17 06/15/17 06/15/17





  19:36 19:57 20:25


 


Temperature 98.3 F  


 


Pulse Rate 75  


 


Respiratory 20 18 18





Rate   


 


Blood Pressure 132/68  





(mmHg)   


 


O2 Sat by Pulse 95  





Oximetry   














  06/15/17 06/15/17 06/15/17





  21:08 21:09 22:23


 


Temperature   


 


Pulse Rate   


 


Respiratory 16 16 18





Rate   


 


Blood Pressure   





(mmHg)   


 


O2 Sat by Pulse   





Oximetry   














  06/15/17 06/16/17 06/16/17





  23:09 00:00 00:23


 


Temperature   


 


Pulse Rate  69 


 


Respiratory 16 18 16





Rate   


 


Blood Pressure  132/74 





(mmHg)   


 


O2 Sat by Pulse  95 





Oximetry   














  06/16/17 06/16/17 06/16/17





  04:19 04:25 06:11


 


Temperature   


 


Pulse Rate 70  


 


Respiratory 18 18 18





Rate   


 


Blood Pressure 130/83  





(mmHg)   


 


O2 Sat by Pulse 95  





Oximetry   














  06/16/17 06/16/17 06/16/17





  08:00 08:03 08:57


 


Temperature  98.1 F 


 


Pulse Rate  73 


 


Respiratory 16 15 16





Rate   


 


Blood Pressure  115/48 





(mmHg)   


 


O2 Sat by Pulse  92 





Oximetry   














  06/16/17 06/16/17 06/16/17





  08:58 09:54 10:57


 


Temperature   


 


Pulse Rate   


 


Respiratory 16  16





Rate   


 


Blood Pressure   





(mmHg)   


 


O2 Sat by Pulse  95 





Oximetry   














  06/16/17





  11:06


 


Temperature 


 


Pulse Rate 


 


Respiratory 16





Rate 


 


Blood Pressure 





(mmHg) 


 


O2 Sat by Pulse 95





Oximetry 











Oxygen Devices in Use Now: Nasal Cannula - 95%-2L


Appearance: Middle aged obese male sitting in a wheelchair, NAD


Eyes: No Scleral Icterus


Ears/Nose/Mouth/Throat: Mucous Membranes Moist


Respiratory: Symmetrical Chest Expansion and Respiratory Effort, Clear to 

Auscultation - few bibasilar crackles


Cardiovascular: NL Sounds; No Murmurs; No JVD, RRR, No Edema


Abdominal: NL Sounds; No Tenderness; No Distention


Extremities: No Clubbing, Cyanosis, - - L BKA-dressing not taken down


Skin: No Rash or Ulcers, No Nodules or Sclerosis


Neurological: Alert and Oriented x 3


Result Diagrams: 


 06/14/17 06:05





 06/14/17 06:05


Microbiology and Other Data: 


 Microbiology











 05/31/17 07:54 Aerobic Blood Culture - Preliminary





 Blood Venous    No Growth Day 1





 Anaerobic Blood Culture - Preliminary





    No Growth Day 1


 


 05/30/17 23:34 Aerobic Blood Culture - Preliminary





 Blood Venous    No Growth Day 1





 Anaerobic Blood Culture - Preliminary





    No Growth Day 1











Diagnostic Imaging: 





Echo 5/8/17 - LVEF 55-60%, mod LVH, diastolic dysfunction, mild to mod reduced 

RV function, unable to assess RV function





MRI L ankle - osteomyelitis of the L calcaneus that looks to have significantly 

advanced when compared to 3/2017





MRI R foot - Moderate degree of subcutaneous edema. No evidence of bone marrow 

edema or replacement to suggest osteomyelitis is present.











Assess/Plan/Problems-Billing





Mr. Katz is a 62 yo gentleman with known osteomyelitis of L calcaneus, poorly 

controlled diabetes, chronic RV failure and diastolic dysfunction with 

significant LE edema as well as untreated DAVID, HTN, COPD, morbid obesity and 

chronic pain who was referred to the hospital by Dr Kuhn.





 





- Patient Problems


(1) Osteomyelitis


Current Visit: Yes   Status: Acute   Code(s): M86.9 - OSTEOMYELITIS, 

UNSPECIFIED   SNOMED Code(s): 60961338


   Comment: s/p L BKA 6/5/17. He is currently a emmanuelle lift and will need STR-he 

[facility]. He will continue on zosyn for 4 

weeks. He will need weekly CBC, CMP, CRP.    





(2) Urinary retention


Current Visit: Yes   Status: Acute   Code(s): R33.9 - RETENTION OF URINE, 

UNSPECIFIED   SNOMED Code(s): 354112278


   Comment: Pt is urinating without any difficulty.    





(3) Diabetes


Current Visit: Yes   Status: Acute   Priority: High   Code(s): E11.9 - TYPE 2 

DIABETES MELLITUS WITHOUT COMPLICATIONS   SNOMED Code(s): 26181182


   Comment: Sugars are still completely out of control. Resume metformin 100mg 

BID and continue lantus but increase to 72units BID. Continue standing and 

sliding scale lispro.    





(4) Chronic pain


Current Visit: Yes   Status: Chronic   Code(s): G89.29 - OTHER CHRONIC PAIN   

SNOMED Code(s): 97202085


   Comment: Continue oxycodone 20mg q4hr prn and oxycontin 30mg BID.    





(5) HTN (hypertension)


Current Visit: Yes   Status: Chronic   Code(s): I10 - ESSENTIAL (PRIMARY) 

HYPERTENSION   SNOMED Code(s): 53944001


   Comment: BP is under good control on lisinopril and atenolol.    





(6) DAVID (obstructive sleep apnea)


Current Visit: Yes   Status: Acute   Code(s): G47.33 - OBSTRUCTIVE SLEEP APNEA (

ADULT) (PEDIATRIC)   SNOMED Code(s): 58528815


   Comment: Continue home CPAP.   





(7) COPD (chronic obstructive pulmonary disease)


Current Visit: Yes   Status: Acute   Code(s): J44.9 - CHRONIC OBSTRUCTIVE 

PULMONARY DISEASE, UNSPECIFIED   SNOMED Code(s): 56154483


   Comment: No signs of exacerbation.    





(8) Hyperlipidemia


Current Visit: Yes   Status: Acute   Code(s): E78.5 - HYPERLIPIDEMIA, 

UNSPECIFIED   SNOMED Code(s): 25516250


   Comment: Continue statin.   





(9) DVT prophylaxis


Current Visit: Yes   Status: Acute   Code(s): JBH6827 -    SNOMED Code(s): 

512919310


   Comment: SQ heparin   





(10) Full code status


Current Visit: Yes   Status: Acute   Code(s): Z78.9 - OTHER SPECIFIED HEALTH 

STATUS   SNOMED Code(s): 379250936


   


Status and Disposition: 





d/c to [facility]

## 2017-06-27 NOTE — CONS
CC:  Dr. [facility], [facility] *

 

CONSULTATION REPORT:

 

DATE OF CONSULT:  06/26/17

 

PRIMARY CARE PROVIDER:  [facility]

 

INFECTIOUS DISEASE SPECIALIST:  Dr. Bj Mejia.

 

REFERRING PHYSICIAN:  Dr. Jose Kuhn.

 

ATTENDING PHYSICIAN:  Dr. Jose Soto (dictated by Minda Mccoy, 
CELSO).

 

CHIEF COMPLAINT:  Chronic osteomyelitis of left calcaneus, status post left BKA
, now status post revision of left stump.

 

HISTORY OF PRESENT ILLNESS:  Mr. Katz is a 62-year-old male with past medical 
history significant for diabetes mellitus, chronic lymphedema, osteomyelitis of 
his left calcaneus status post left BKA on June 5th, obstructive sleep apnea, 
chronic pain, and hypertension, who presented to the hospital today for 
revision of his left BKA with Dr. Kuhn.  The patient was recently 
hospitalized from 05/30/17 to 06/16/17, at which time, he was discharged to 
[facility] for continued rehab.  Dr. Kuhn has asked for the 
hospitalist to consult on the patient to assist with medical management of the 
patient during his hospitalization.

 

PAST MEDICAL HISTORY:

1.  Chronic lymphedema.

2.  Diabetes mellitus, insulin dependent.

3.  COPD.

4.  Hypertension.

5.  Osteomyelitis of the left calcaneus.

6.  Obstructive sleep apnea, uses BiPAP at home.

7.  Chronic pain.

8.  Morbid obesity with BMI of 44.

 

PAST SURGICAL HISTORY:

1.  Status post partial amputation of the right toe.

2.  Status post gastric sleeve.

3.  Status post knee arthroscopy.

4.  Status post left BKA, on 06/05/17.

 

HOME MEDICATIONS:  Include:

1.  Oxycodone 20 mg oral every 4 hours as needed for pain.

2.  OxyContin 30 mg oral twice daily.

3.  Amlodipine 5 mg oral daily.

4.  Vitamin B complex 1 tablet oral daily.

5.  Simvastatin 40 mg oral daily.

6.  Betadine apply to the right great toe and right metatarsal 3 times daily.

7.  Oxygen 2 L per minute for oxygen less than 88%.

8.  Omeprazole 20 mg oral daily.

9.  Nystatin powder apply topical twice daily under breasts.

10.  Melatonin 3 mg oral daily at bedtime as needed for sleep.

11.  Lisinopril 30 mg oral daily.

12.  Lactobacillus 1 capsule oral twice daily.

13.  Lisinopril 10 units subcutaneous with meals.

14.  Lantus insulin 80 units subcutaneous twice daily.

15.  Furosemide 20 mg oral daily.

16.  Diclofenac 1% gel apply topical twice daily to bilateral shoulders.

17.  Vitamin D 400 units oral daily.

18.  Zyrtec 10 mg oral daily.

19.  Cefepime 2 g intravenous every 12 hours.

20.  Tums 1000 mg oral twice daily as needed for indigestion.

21.  Atenolol 50 mg oral daily.

22.  Ammonium lactate 12% cream apply twice daily to the right lower extremity.

 

ALLERGIES:  No known drug allergies.

 

FAMILY HISTORY:  The patient's father had a history of MI and passed at age 50. 
The patient has a brother with history of heart transplant.  The patient's 
mother passed from cancer.  The patient denies any family history of diabetes 
mellitus.

 

SOCIAL HISTORY:  The patient is a former smoker.  He quit smoking several years 
ago.  He is unsure of how long he smoked.  He denies alcohol or recreational 
drug use.  He lives with his wife.  He is a retired .  His wife, 
Janice Katz, will be his surrogate decision maker in the event he is unable to 
make decisions for himself.

 

REVIEW OF SYSTEMS:  I performed a 14-point review of systems.  All the 
pertinent positives and negatives are mentioned in the history of present 
illness.  The remaining review of systems is negative.

 

PHYSICAL EXAM:  Vital Signs:  Temperature 96.8, heart rate 82, respiratory rate 
14, O2 sats 96% on 2 L, blood pressure 157/76.  General Appearance:  The 
patient is alert, pleasant, appears to be in no acute distress.  HEENT:  
Normocephalic, atraumatic.  Pupils are equal and reactive to light.  
Extraocular movements are intact.  Respiratory:  There is no accessory muscle 
use and the lungs are clear to auscultation bilaterally but diminished in the 
bases.  Cardiovascular:  Regular rate and rhythm.  S1 and S2 present. There are 
no murmurs, rubs, or gallops heard. Abdomen:  Soft, nontender, nondistended.  
Bowel sounds present x4.  Extremities: There is trace lower extremity edema to 
the right lower extremity.  DP and PT pulses are 2+ on the right lower extremity
, unable to palpate popliteal pulse on the left due to large dressing and the 
patient does have femoral pulse that is 2+ on the left side.  Musculoskeletal:  
There is no clubbing or cyanosis noted. Neurological:  The patient is alert and 
oriented x4.  Cranial nerves II through XII are grossly intact.  Psychological:
  The patient is calm and cooperative.  Skin: There are no rashes or 
abnormalities seen.  The patient has a dressing that was clean, dry, and intact 
to the left lower extremity with a wound VAC in place.

 

DIAGNOSTIC STUDIES/LAB DATA:  Preoperatively from 06/14/17:  Sodium 135, 
potassium 3.9, chloride 89, CO2 43, BUN 17, creatinine 0.82, and glucose 206.  
White blood cell count 7.8, hemoglobin 10.4, hematocrit 33, and platelet count 
286.

 

IMPRESSION:  Mr. Katz is a 62-year-old male with past medical history 
significant for chronic lymphedema, diabetes mellitus, chronic obstructive 
pulmonary disease, hypertension, osteomyelitis of the left calcaneus, status 
post left below-the-knee amputation and obstructive sleep apnea, who presents 
to the hospital today for revision of his left stump with Dr. Kuhn.  
Hospitalists have been asked to consult on this patient to assist with his 
medical comorbidities during his hospitalization.

 

ASSESSMENT AND PLAN:  



1.  Status post revision of left below-the-knee amputation. Management per 
Orthopedic Surgery.  I recommend trending the patient's H and H. Antibiotic 
will be continued per Orthopedics.  I have recommended the patient be seen in 
consultation by Dr. Bj Mejia to ensure the patient is on the 
appropriate antibiotics.

2.  Chronic lymphedema.  This is greatly improved since the patient was started 
on diuretics.  For now, we will continue his furosemide.

3.  Diabetes mellitus.  The patient's last hemoglobin A1c from May was 9.7.  
For now, we can do fingerstick a.c. and h.s. with lispro sliding scale with 
meals.  I am going to decrease the patient's Lantus to 40 units as I am not 
sure how he is eating here compared to at the nursing home.  We can adjust his 
Lantus as needed.

4.  Chronic obstructive pulmonary disease.  The patient has no signs of 
exacerbation at this time.  We will use supplemental oxygen as needed for 
oxygen saturation less than 88%.

5.  Chronic pain.  The patient previously has been followed by Dr. Taylor, who 
had discharged him.  The patient was then following with Dr. Wilder and on 
Suboxone. Since his last discharge, he has been on OxyContin and oxycodone for 
his pain in addition to Lyrica.  I recommend cautious use of adjusting his pain 
medications during his stay.

6.  Obstructive sleep apnea.  The patient is now compliant with his BiPAP.  He 
will use it every night.

7.  Hypertension.  I am going to hold the patient's lisinopril in the 
perioperative period.  I am going to continue his atenolol and amlodipine and 
possibly restart his lisinopril in the morning.

8.  Morbid obesity.  The patient is status post a gastric sleeve.  Has a BMI of 
44.

9.  Fluids, electrolytes, and nutrition:  The patient is going to be on 
consistent carbohydrate diet.

10.  Code status.  Full code.

11.  DVT prophylaxis.  The patient is at highest risk.  He is going to have 
SCDs and Lovenox per Orthopedic Surgery.

12.  Disposition.  Inpatient with disposition per Orthopedic Surgery.

 

TIME SPENT:  Time for this admission was 60 minutes, greater than half of that 
was spent face-to-face with the patient and wife discussing past medical history
, medications, and the events leading up to their arrival today and performing 
a physical examination.  The case has been reviewed with the attending doctor, 
Dr. Soto, who agrees with the plan of care.

 

Reviewed by MICHAELA JOINER  06/29/17  1527

 

[id number]/[facility] #: [id number]

MYA

## 2017-06-27 NOTE — OP
DATE OF OPERATION:  06/26/17 - ROOM #348

 

DATE OF BIRTH:  [birth year]

 

SURGEON:  Jose Kuhn MD

 

ASSISTANT:  Inez Carrion PA-C



ANESTHESIOLOGIST:  Mingo Cabezas MD



ANESTHESIA:  Spinal

 

PRE-OP DIAGNOSIS:  Breakdown left transtibial amputation site.

 

POST-OP DIAGNOSIS:  Breakdown left transtibial amputation site with deep 
necrotic tissue.

 

OPERATIVE PROCEDURE:  Revision amputation, debridement, shortening tibia and 
fibula, closure of wound with retention sutures, and VAC dressing.

 

DESCRIPTION OF PROCEDURE:  Patient was taken to the operating room where we 
excised the previous wound edges with 10 blade.  We opened up the anterior flap 
of the gastroc over the anterior tibial periosteum and there was some necrotic 
material located at this level.  Cultures were sent.  We debrided all tissue 
planes with #10 blade and also shortened the tibia and fibula another 2 cm.  
Local cultures were sent as well as the bone and tissue for pathology.  After 3 
L pulsatile lavage, we dropped the tourniquet and obtained hemostasis.  Closure 
consisted of #1 Vicryl sutures for the subcutaneous tissue and then 0 Surgipro 
sutures on large taper needle with rubber dam retention sutures.  We placed the 
VAC sponge over the wound to control the edema and splinted in extension.

 

 [id number]/[facility] #: [id number]

[facility]

## 2017-06-27 NOTE — PN
Progress Note





- Progress Note


Date of Service: 06/27/17


SOAP: 


Subjective:


[]Patient seen at bedside.  Complaining of severe stump pain and refusing to 

work with physical therapy today. Wound vac on and collecting moderate 

serosanguenous drainage. Blood pressure meds held this am as pressures running 

low.








Objective:


[]


 Vital Signs











Temp  97.9 F   06/27/17 08:47


 


Pulse  67   06/27/17 08:47


 


Resp  20   06/27/17 08:47


 


BP  99/64   06/27/17 08:47


 


Pulse Ox  96   06/27/17 08:47








 Intake & Output











 06/26/17 06/27/17 06/27/17





 18:59 06:59 18:59


 


Intake Total 1415 1000 


 


Output Total  1700 300


 


Balance 1415 -700 -300


 


Weight 325 lb 15.96 oz  


 


Intake:   


 


  IV Fluids 850  


 


      


 


    NS 50ML, Cefazolin 2G 50  


 


  Oral 565 1000 


 


Output:   


 


  Urine  1700 300


 


Other:   


 


  # Bowel Movements  0 








 Laboratory Results - last 24 hr











  06/26/17 06/26/17 06/26/17





  13:38 15:59 20:40


 


POC Glucose (mg/dL)  167 H  150 H  349 H














  06/27/17





  08:53


 


POC Glucose (mg/dL)  162 H








 Microbiology











 06/26/17 15:09 Gram Stain - Final





 Wound - Left 








LLE stump ACE is clean and dry, no foul odor


Wound vac in place w moderate fluid collection in container














Assessment:


[]s/p revision infected LLE BKA stump w wound vac placement POD #1








Plan:


[]Wound vac to be changed by Dr. Kuhn in a couple of days


   Pain management


    currently on Vanco and Zosyn, re consultation with Dr. Mejia requested 

for continued antibiotic coverage recommendations

## 2017-06-27 NOTE — PN
Subjective


Date of Service: 06/27/17


Interval History: 





Patient seen this morning. Main complaint is uncontrolled pain at the stump 

site. Otherwise feeling well, good PO intake, no N/V. Urinating well, no BM yet 

but claims he had normal BMs the days leading up to surgery. 


Family History: Unchanged from Admission


Social History: Unchanged from Admission


Past Medical History: Unchanged from Admission





Objective


Active Medications: 








Al Hydrox/Mg Hydrox/Simethicone (Maalox Plus*)  30 ml PO Q4H PRN


Amlodipine Besylate (Norvasc Tab*)  5 mg PO BEDTIME ERNST


Atenolol (Tenormin Tab*)  50 mg PO QAM ERNST


Atorvastatin Calcium (Lipitor*)  20 mg PO 1700 ERNST


Calcium Carbonate (Tums*)  1,000 mg PO BID PRN


Cetirizine HCl (Zyrtec*)  10 mg PO DAILY ERNST


Cholecalciferol (Vitamin D Tab*)  400 unit PO DAILY ERNST


Dextrose (D50w Syringe 50 Ml*)  12.5 gm IV PUSH .FOR FS < 60 - SS PRN


Diphenhydramine HCl (Benadryl Iv*)  25 mg IV Q6H PRN


Docusate Sodium (Colace Cap*)  100 mg PO BID ERNST


Enoxaparin Sodium (Lovenox(*))  40 mg SUBCUT Q24H ERNST


Furosemide (Lasix Tab*)  20 mg PO DAILY ERNST


Heparin Sodium (Porcine) (Heparin Flush Picc/Ml/Cvc(*))  1 ml FLUSH 0600,1800 

ERNST


Hydromorphone HCl (Dilaudid Pca*)  20 mg in 20 mls @ 0 mls/hr PCA .change Q24H 

ERNST; Per Protocol


Sodium Chloride (Ns 0.9% 1000 Ml*)  1,000 mls @ 75 mls/hr IV PER RATE ERNST


Piperacillin Sod/Tazobactam (Sod 3.375 gm/ Sodium Chloride)  100 mls @ 25 mls/

hr IVPB 0100,0900,1700 ERNST


Vancomycin HCl 1,250 mg/ (Sodium Chloride)  250 mls @ 166.667 mls/hr IVPB Q8H 

ERNST


Insulin Glargine (Lantus(*))  40 units SUBCUT BID ERNST


Insulin Human Lispro (Humalog*)  0 - 15 units SUBCUT AC ERNST


Lactobacillus Rhamnosus (Culturelle*)  1 cap PO BID ERNST


Multivitamins (Theragran Tab*)  1 tab PO DAILY [facility]


Nystatin (Nystatin Top Powder*)  1 applic TOPICAL BID [facility]


Omeprazole (Prilosec Cap*)  20 mg PO DAILY@0730 [facility]


Ondansetron HCl (Zofran Inj*)  4 mg IV Q6H PRN


Oxycodone HCl (Roxycodone Tab*)  20 mg PO Q4H PRN


Oxycodone HCl (Oxycontin(*))  30 mg PO BID [facility]


Pharmacy Consult (Vancomycin Per Pharmacy*)  1 note FOLLOW UP . PRN


Pharmacy Profile Note (Vancomycin Trough Check)  1 note FOLLOW UP 0530 ONE


Trazodone HCl (Desyrel Tab*)  25 mg PO BEDTIME PRN


Vitamin B Complex/Vitamin E (Complex B-100*)  1 tab PO DAILY [facility]





 Vital Signs











  06/26/17 06/26/17 06/26/17





  13:16 15:55 16:00


 


Temperature 97.0 F 96.8 F 


 


Pulse Rate 67 64 67


 


Respiratory 16 16 16





Rate   


 


Blood Pressure 120/74 127/71 118/65





(mmHg)   


 


O2 Sat by Pulse 96 97 





Oximetry   














  06/26/17 06/26/17 06/26/17





  17:55 18:05 19:07


 


Temperature  98.0 F 98.0 F


 


Pulse Rate  72 84


 


Respiratory 16 16 19





Rate   


 


Blood Pressure  126/64 122/68





(mmHg)   


 


O2 Sat by Pulse 98 98 99





Oximetry   














  06/27/17 06/27/17 06/27/17





  06:30 08:11 08:47


 


Temperature   97.9 F


 


Pulse Rate   67


 


Respiratory 20 16 20





Rate   


 


Blood Pressure   99/64





(mmHg)   


 


O2 Sat by Pulse   96





Oximetry   











Oxygen Devices in Use Now: Nasal Cannula


Appearance: Middle-aged,  M, laying in bed in NAD


Eyes: No Scleral Icterus


Ears/Nose/Mouth/Throat: Mucous Membranes Moist


Neck: NL Appearance and Movements; NL JVP


Respiratory: Symmetrical Chest Expansion and Respiratory Effort, Clear to 

Auscultation


Cardiovascular: NL Sounds; No Murmurs; No JVD, RRR


Abdominal: - - Obese, soft, NTND, BS+


Lymphatic: No Cervical Adenopathy


Extremities: - - L BKA, stump dressed, c/d/i, wound vac


Skin: - - Chronic LE skin changes on R


Neurological: Alert and Oriented x 3


Microbiology and Other Data: 


 Microbiology











 06/26/17 15:09 Gram Stain - Final





 Wound - Left 














Assess/Plan/Problems-Billing


Assessment: 


L stump revision in a 61 yo M with hx of HTN, DM, COPD, chronic lymphedema, DAVID 

and hx of osteomyelitis of LLE s/p BKA








- Patient Problems


(1) Amputation stump infection


Current Visit: Yes   Comment: s/p revision by Dr. Kuhn on 6/26. ID to consult 

regarding ABx management, currently on Vanc/Zosyn. Wound vac in place   





(2) Chronic pain


Current Visit: No   Comment: Patient is currently on Dilaudid PCA and was 

receiving short acting oral opiates. Will resume home dose of Oxycontin 30 mg 

BID and Oxycodone 20 mg q4h prn. Will continue PCA for now with plans to wean 

off.    





(3) Diabetes


Current Visit: No   Comment: BGs controlled so far. Continue reduced Lantus 40 

units BID with HISS.    





(4) DAVID (obstructive sleep apnea)


Current Visit: No   Comment: Continue home BiPAP.   





(5) HTN (hypertension)


Current Visit: No   Comment: BPs soft this AM, Atenolol held. Continue Norvasc. 

Holding Lisinopril.    





(6) Lymphedema


Current Visit: No   Comment: Chronic, likely related to RV and diastolic 

failure. Continue Lasix. Stop IVF.   





(7) DVT prophylaxis


Current Visit: No   Comment: Lovenox/SCDs

## 2017-06-28 NOTE — PN
Subjective


Date of Service: 06/28/17


Interval History: 





Patient seen this morning. Says pain seems to be improving, says he is hopeful 

to get off of the PCA by the end of the day today. No BM yet. About to eat 

breakfast. 


Family History: Unchanged from Admission


Social History: Unchanged from Admission


Past Medical History: Unchanged from Admission





Objective


Active Medications: 








Al Hydrox/Mg Hydrox/Simethicone (Maalox Plus*)  30 ml PO Q4H PRN


Amlodipine Besylate (Norvasc Tab*)  5 mg PO BEDTIME ERNST


Atenolol (Tenormin Tab*)  50 mg PO QAM ERNST


Atorvastatin Calcium (Lipitor*)  20 mg PO 1700 ERNST


Calcium Carbonate (Tums*)  1,000 mg PO BID PRN


Cetirizine HCl (Zyrtec*)  10 mg PO DAILY ERNST


Cholecalciferol (Vitamin D Tab*)  400 unit PO DAILY ERNST


Dextrose (D50w Syringe 50 Ml*)  12.5 gm IV PUSH .FOR FS < 60 - SS PRN


Diphenhydramine HCl (Benadryl Iv*)  25 mg IV Q6H PRN


Docusate Sodium (Colace Cap*)  100 mg PO BID ERNST


Enoxaparin Sodium (Lovenox(*))  40 mg SUBCUT Q24H ERNST


Furosemide (Lasix Tab*)  20 mg PO DAILY ERNST


Heparin Sodium (Porcine) (Heparin Flush Picc/Ml/Cvc(*))  1 ml FLUSH 0600,1800 

ERNST


Hydromorphone HCl (Dilaudid Pca*)  20 mg in 20 mls @ 0 mls/hr PCA .change Q24H 

ERNST; Per Protocol


Vancomycin HCl 1,250 mg/ (Sodium Chloride)  250 mls @ 166.667 mls/hr IVPB Q8H 

ERNST


Insulin Glargine (Lantus(*))  60 units SUBCUT BID ERNST


Insulin Human Lispro (Humalog*)  0 - 15 units SUBCUT AC ERNST


Lactobacillus Rhamnosus (Culturelle*)  1 cap PO BID ERNST


Multivitamins (Theragran Tab*)  1 tab PO DAILY ERNST


Nystatin (Nystatin Top Powder*)  1 applic TOPICAL BID ERNST


Omeprazole (Prilosec Cap*)  20 mg PO DAILY@0730 ERNST


Ondansetron HCl (Zofran Inj*)  4 mg IV Q6H PRN


Oxycodone HCl (Roxycodone Tab*)  20 mg PO Q4H PRN


Oxycodone HCl (Oxycontin(*))  30 mg PO BID [facility]


Pharmacy Consult (Vancomycin Per Pharmacy*)  1 note FOLLOW UP . PRN


Pharmacy Profile Note (Vancomycin Trough Check)  1 note FOLLOW UP ONCE ONE


Trazodone HCl (Desyrel Tab*)  25 mg PO BEDTIME PRN


Vitamin B Complex/Vitamin E (Complex B-100*)  1 tab PO DAILY [facility]





 Vital Signs











  06/27/17 06/27/17 06/27/17





  10:15 11:27 11:55


 


Temperature  98.0 F 


 


Pulse Rate  87 


 


Respiratory 16 20 16





Rate   


 


Blood Pressure  114/63 





(mmHg)   


 


O2 Sat by Pulse  95 





Oximetry   














  06/28/17 06/28/17 06/28/17





  06:00 06:51 07:23


 


Temperature   97.9 F


 


Pulse Rate   84


 


Respiratory 18 18 18





Rate   


 


Blood Pressure   133/64





(mmHg)   


 


O2 Sat by Pulse 95  99





Oximetry   











Oxygen Devices in Use Now: Nasal Cannula


Appearance: Middle-aged,  M, laying in bed in NAD


Eyes: No Scleral Icterus


Ears/Nose/Mouth/Throat: Mucous Membranes Moist


Neck: NL Appearance and Movements; NL JVP


Respiratory: Symmetrical Chest Expansion and Respiratory Effort, Clear to 

Auscultation


Cardiovascular: NL Sounds; No Murmurs; No JVD, RRR


Abdominal: - - Obese, soft, NTND, ventral hernia, BS+


Lymphatic: No Cervical Adenopathy


Extremities: - - LLE stump with dressing/wound vac in place, c/d/i


Neurological: Alert and Oriented x 3


Result Diagrams: 


 06/27/17 12:00





 06/27/17 12:00


Microbiology and Other Data: 


 








Assess/Plan/Problems-Billing


Assessment: 


L stump revision in a 63 yo M with hx of HTN, DM, COPD, chronic lymphedema, DAVID 

and hx of osteomyelitis of LLE s/p BKA








- Patient Problems


(1) Amputation stump infection


Current Visit: Yes   Comment: s/p revision by Dr. Kuhn on 6/26. Appreciate ID 

assistance, continue Vanc/Cefepime (switched from Zosyn). Wound vac in place   





(2) Chronic pain


Current Visit: No   Comment: Continue home dose of Oxycontin 30 mg BID and 

Oxycodone 20 mg q4h prn. Plan to wean off Dilaudid PCA later today   





(3) Diabetes


Current Visit: No   Comment: Increase Lantus to 60 units BID with HISS.    





(4) DAVID (obstructive sleep apnea)


Current Visit: No   Comment: Continue home BiPAP.   





(5) HTN (hypertension)


Current Visit: No   Comment: Continue Norvasc and Atenolol. Holding Lisinopril.

    





(6) Lymphedema


Current Visit: No   Comment: Chronic, likely related to RV and diastolic 

failure. Continue Lasix.   





(7) DVT prophylaxis


Current Visit: No   Comment: Lovenox/SCD

## 2017-06-28 NOTE — PN
Progress Note





- Progress Note


Date of Service: 06/28/17


SOAP: 


Subjective:


[]Patient seen at bedside.  Pain is better managed overall but still using PCA 

dilaudid which he is hoping to discontinue later today.  His LLE stump pain is 

described at burning pain.  








Objective:


[]


 Vital Signs











Temp  97.9 F   06/28/17 07:23


 


Pulse  84   06/28/17 07:23


 


Resp  18   06/28/17 09:04


 


BP  133/64   06/28/17 07:23


 


Pulse Ox  99   06/28/17 07:59








 Intake & Output











 06/27/17 06/28/17 06/28/17





 18:59 06:59 18:59


 


Intake Total 2971 2640 


 


Output Total 3325 950 350


 


Balance -354 1690 -350


 


Intake:   


 


  IV Fluids 640  


 


    NS (0.9%) 640  


 


  IVPB 331  


 


    NS (0.9%) 331  


 


  Oral 2000 2640 


 


Output:   


 


  Urine 3325 950 350


 


Other:   


 


  Estimated Void Medium  


 


  # Voids 1  








 Laboratory Results - last 24 hr











  06/27/17 06/27/17 06/27/17





  12:00 12:00 12:01


 


Hgb   10.4 L 


 


Hct   33 L 


 


Sodium  132 L  


 


Potassium  4.6  


 


Chloride  98 L  


 


Carbon Dioxide  34 H  


 


BUN  16  


 


Creatinine  0.66 L  


 


Est GFR ( Amer)  158.3  


 


Est GFR (Non-Af Amer)  123.1  


 


BUN/Creatinine Ratio  24.2 H  


 


Glucose  276 H  


 


POC Glucose (mg/dL)    301 H


 


Calcium  8.6  


 


Vancomycin Trough   














  06/27/17 06/27/17 06/28/17





  17:52 22:01 05:15


 


Hgb   


 


Hct   


 


Sodium   


 


Potassium   


 


Chloride   


 


Carbon Dioxide   


 


BUN   


 


Creatinine   


 


Est GFR ( Amer)   


 


Est GFR (Non-Af Amer)   


 


BUN/Creatinine Ratio   


 


Glucose   


 


POC Glucose (mg/dL)  191 H  297 H 


 


Calcium   


 


Vancomycin Trough    12.4














  06/28/17





  07:19


 


Hgb 


 


Hct 


 


Sodium 


 


Potassium 


 


Chloride 


 


Carbon Dioxide 


 


BUN 


 


Creatinine 


 


Est GFR ( Amer) 


 


Est GFR (Non-Af Amer) 


 


BUN/Creatinine Ratio 


 


Glucose 


 


POC Glucose (mg/dL)  211 H


 


Calcium 


 


Vancomycin Trough 








LLE stump remains dry and intact with wound vac in place and running.  








Assessment:


[]s/p revision infected L BKA stump POD #2








Plan:


[]IV abx, Vanco and cefepime as per Dr. Mejia


   Wound vac change per Dr. Kuhn

## 2017-06-28 NOTE — PN
Progress Note





- Progress Note


Date of Service: 06/28/17


SOAP: 


Subjective:


CC: leg infection


HPI: 61 yo man with recent left BKA for chronic osteomyelitis, had wound 

necrosis, readmitted for stump revision.  Feels well, no rash or diarrhea.  

Burning pain at surgical site.  Has sore on his backside which developed before 

he got here. 








Objective:


[]


 Vital Signs











Temp  36.6 C   06/28/17 07:23


 


Pulse  84   06/28/17 07:23


 


Resp  18   06/28/17 07:59


 


BP  133/64   06/28/17 07:23


 


Pulse Ox  99   06/28/17 07:59








 Intake & Output











 06/27/17 06/28/17 06/28/17





 18:59 06:59 18:59


 


Intake Total 2971 2640 


 


Output Total 3325 950 350


 


Balance -354 1690 -350


 


Intake:   


 


  IV Fluids 640  


 


    NS (0.9%) 640  


 


  IVPB 331  


 


    NS (0.9%) 331  


 


  Oral 2000 2640 


 


Output:   


 


  Urine 3325 950 350


 


Other:   


 


  Estimated Void Medium  


 


  # Voids 1  








Gen:Awake, no distress


HEENT:PERRL, MMM


Neck:Supple


Heart:RRR no murmur


Lungs:CTA BL


Abd:+BS NTND soft


Skin: no rash; on sacrum there is a large path of non blanching erythema, no 

ulcer


MSK: L leg stump wrapped


 Laboratory Results - last 24 hr











  06/27/17 06/27/17 06/27/17





  08:53 12:00 12:00


 


Hgb    10.4 L


 


Hct    33 L


 


Sodium   132 L 


 


Potassium   4.6 


 


Chloride   98 L 


 


Carbon Dioxide   34 H 


 


BUN   16 


 


Creatinine   0.66 L 


 


Est GFR ( Amer)   158.3 


 


Est GFR (Non-Af Amer)   123.1 


 


BUN/Creatinine Ratio   24.2 H 


 


Glucose   276 H 


 


POC Glucose (mg/dL)  162 H  


 


Calcium   8.6 


 


Vancomycin Trough   














  06/27/17 06/27/17 06/27/17





  12:01 17:52 22:01


 


Hgb   


 


Hct   


 


Sodium   


 


Potassium   


 


Chloride   


 


Carbon Dioxide   


 


BUN   


 


Creatinine   


 


Est GFR ( Amer)   


 


Est GFR (Non-Af Amer)   


 


BUN/Creatinine Ratio   


 


Glucose   


 


POC Glucose (mg/dL)  301 H  191 H  297 H


 


Calcium   


 


Vancomycin Trough   














  06/28/17 06/28/17





  05:15 07:19


 


Hgb  


 


Hct  


 


Sodium  


 


Potassium  


 


Chloride  


 


Carbon Dioxide  


 


BUN  


 


Creatinine  


 


Est GFR ( Amer)  


 


Est GFR (Non-Af Amer)  


 


BUN/Creatinine Ratio  


 


Glucose  


 


POC Glucose (mg/dL)   211 H


 


Calcium  


 


Vancomycin Trough  12.4 

















Assessment:


1. L leg stump necrosis s/p revision


2. chronic osteomyelitis of left calcaneous s/p BKA


3. morbid obesity and lymphedema


4. diabetes with neuropathy








Plan:


1. continue vancomycin goal tr 15-20, will change zosyn to cefepime as he had 

diarrhea with zosyn; await cultures.  Grew S. maltophila earlier culture last 

week which is most likely a colonization as it is not associated with skin or 

wound infection.

## 2017-06-29 NOTE — PN
Progress Note





- Progress Note


Date of Service: 06/29/17


SOAP: 


Subjective:


[]Patient seen OOB in chair, just emmanuelle lifted from bed.  Still c/o moderate 

burning stump pain.  He has not discontinued the dilaudid PCA. 








Objective:


[]


 Vital Signs











Temp  98.0 F   06/29/17 07:22


 


Pulse  105   06/29/17 07:22


 


Resp  20   06/29/17 08:24


 


BP  112/65   06/29/17 07:22


 


Pulse Ox  99   06/29/17 07:22








 Intake & Output











 06/28/17 06/29/17 06/29/17





 18:59 06:59 18:59


 


Intake Total 1363 3159 360


 


Output Total 1650 1475 1250


 


Balance -287 1684 -890


 


Intake:   


 


  IV Fluids 309 373 


 


    NS (0.9%) 309 373 


 


  IVPB 334 461 


 


    ABX - VANCOMYCIN 334 461 


 


  Oral 720 2325 360


 


Output:   


 


  Urine 1650 1475 1250


 


Other:   


 


  Estimated Void  Medium 


 


  Date of Last Bowel  6/29/17 





  Movement   


 


  # Bowel Movements  1 


 


  Estimated Stool Amount Medium Large 


 


  # Voids  1 








 Laboratory Results - last 24 hr











  06/28/17 06/28/17 06/28/17





  11:42 16:38 20:58


 


POC Glucose (mg/dL)  349 H  282 H  271 H














  06/29/17





  07:50


 


POC Glucose (mg/dL)  145 H








 Microbiology











 06/26/17 15:09 Anaerobic Culture - Preliminary





 Wound - Left Gram Stain - Final





 Wound Culture - Final





    Stenotrophomas Maltophilia





    Vre Enterococcus Faecium








LLE stump remains clean and dry with wound vac working well.








Assessment:


[]s/p Revision infected LLE BKA stump POD #3








Plan:


[]Wound vac dressing change per Dr. Kuhn


  Continue current IV abx and pain medications as needed, hopefully off PCA soon

## 2017-06-29 NOTE — PN
Subjective


Date of Service: 06/29/17


Interval History: 





Patient seen this afternoon. Had an episode of nausea and diaphoresis earlier 

this afternoon after starting lunch. No emesis. Reports a transient pain 

through his abdomen which resolved. No further problems. Says his behind is 

itchy. Does not think he is ready to come off PCA, agreed for stop time order 

tomorrow AM. 


Family History: Unchanged from Admission


Social History: Unchanged from Admission


Past Medical History: Unchanged from Admission





Objective


Active Medications: 








Al Hydrox/Mg Hydrox/Simethicone (Maalox Plus*)  30 ml PO Q4H PRN


   PRN Reason: INDIGESTION


Amlodipine Besylate (Norvasc Tab*)  5 mg PO BEDTIME [facility]


   Last Admin: 06/28/17 21:09 Dose:  5 mg


Atenolol (Tenormin Tab*)  50 mg PO QAM [facility]


   Last Admin: 06/29/17 08:29 Dose:  50 mg


Atorvastatin Calcium (Lipitor*)  20 mg PO 1700 [facility]


   Last Admin: 06/28/17 16:35 Dose:  20 mg


Calcium Carbonate (Tums*)  1,000 mg PO BID PRN


   PRN Reason: INDIGESTION


   Last Admin: 06/29/17 12:45 Dose:  1,000 mg


Cetirizine HCl (Zyrtec*)  10 mg PO DAILY [facility]


   PRN Reason: Protocol


   Last Admin: 06/29/17 08:22 Dose:  10 mg


Cholecalciferol (Vitamin D Tab*)  400 unit PO DAILY [facility]


   Last Admin: 06/29/17 08:22 Dose:  400 unit


Dextrose (D50w Syringe 50 Ml*)  12.5 gm IV PUSH .FOR FS < 60 - SS PRN


   PRN Reason: FS < 60


Diphenhydramine HCl (Benadryl Iv*)  25 mg IV Q6H PRN


   PRN Reason: itching


Docusate Sodium (Colace Cap*)  100 mg PO BID [facility]


   Last Admin: 06/29/17 08:22 Dose:  100 mg


Enoxaparin Sodium (Lovenox(*))  40 mg SUBCUT Q24H [facility]


   Last Admin: 06/29/17 08:29 Dose:  40 mg


Furosemide (Lasix Tab*)  20 mg PO DAILY [facility]


   Last Admin: 06/29/17 08:22 Dose:  20 mg


Heparin Sodium (Porcine) (Heparin Flush Picc/Ml/Cvc(*))  1 ml FLUSH 0600,1800 

[facility]


   PRN Reason: Protocol


   Last Admin: 06/29/17 05:51 Dose:  1 ml


Hydroxyzine HCl (Atarax Tab*)  25 mg PO Q4H PRN


   PRN Reason: ITCHING


Hydromorphone HCl (Dilaudid Pca*)  20 mg in 20 mls @ 0 mls/hr PCA .change Q24H 

[facility]; Per Protocol


   PRN Reason: Protocol


   Stop: 06/30/17 08:00


   Last Admin: 06/29/17 14:43 Dose:  1 mls/hr


Vancomycin HCl 1,250 mg/ (Sodium Chloride)  250 mls @ 166.667 mls/hr IVPB Q8H 

[facility]


   Last Admin: 06/29/17 14:43 Dose:  166.667 mls/hr


Insulin Glargine (Lantus(*))  80 units SUBCUT BID [facility]


Insulin Human Lispro (Humalog*)  0 - 15 units SUBCUT AC [facility]


   PRN Reason: Protocol


   Last Admin: 06/29/17 12:47 Dose:  12 unit


Insulin Human Lispro (Humalog*)  0 - 15 units SUBCUT AC [facility]


   PRN Reason: Protocol


Lactobacillus Rhamnosus (Culturelle*)  1 cap PO BID [facility]


   Last Admin: 06/29/17 08:23 Dose:  1 cap


Multivitamins (Theragran Tab*)  1 tab PO DAILY [facility]


   Last Admin: 06/29/17 08:23 Dose:  1 tab


Nystatin (Nystatin Top Powder*)  1 applic TOPICAL BID [facility]


   Last Admin: 06/29/17 08:32 Dose:  1 applic


Omeprazole (Prilosec Cap*)  20 mg PO DAILY@0730 [facility]


   Last Admin: 06/29/17 08:22 Dose:  20 mg


Ondansetron HCl (Zofran Inj*)  4 mg IV Q6H PRN


   PRN Reason: nausea


   Last Admin: 06/29/17 14:43 Dose:  4 mg


Oxycodone HCl (Roxycodone Tab*)  20 mg PO Q4H PRN


   PRN Reason: PAIN


   Last Admin: 06/29/17 12:46 Dose:  20 mg


Oxycodone HCl (Oxycontin(*))  30 mg PO BID [facility]


   Last Admin: 06/29/17 08:24 Dose:  30 mg


Pharmacy Consult (Vancomycin Per Pharmacy*)  1 note FOLLOW UP . PRN


   PRN Reason: PER PROTOCOL


Pharmacy Profile Note (Vancomycin Trough Check)  1 note FOLLOW UP ONCE ONE


   Stop: 06/30/17 05:31


Trazodone HCl (Desyrel Tab*)  25 mg PO BEDTIME PRN


   PRN Reason: insomnia


   Last Admin: 06/27/17 01:08 Dose:  25 mg


Vitamin B Complex/Vitamin E (Complex B-100*)  1 tab PO DAILY [facility]


   Last Admin: 06/29/17 08:23 Dose:  1 tab








 Vital Signs











  06/28/17 06/28/17 06/28/17





  15:50 16:00 16:36


 


Temperature 98.7 F  98.7 F


 


Pulse Rate 70  70


 


Respiratory 23 18 23





Rate   


 


Blood Pressure 128/69  128/69





(mmHg)   


 


O2 Sat by Pulse 99 98 99





Oximetry   














  06/28/17 06/28/17 06/28/17





  17:04 18:30 19:30


 


Temperature   


 


Pulse Rate   


 


Respiratory 18 18 20





Rate   


 


Blood Pressure   





(mmHg)   


 


O2 Sat by Pulse   





Oximetry   














  06/29/17 06/29/17 06/29/17





  12:46 13:31 14:43


 


Temperature  97.5 F 


 


Pulse Rate  74 


 


Respiratory 18 14 18





Rate   


 


Blood Pressure  132/79 





(mmHg)   


 


O2 Sat by Pulse  93 





Oximetry   











Oxygen Devices in Use Now: Nasal Cannula


Appearance: Middle-aged, obese,  M, laying in bed in NAD


Eyes: No Scleral Icterus


Ears/Nose/Mouth/Throat: Mucous Membranes Moist


Neck: NL Appearance and Movements; NL JVP


Respiratory: Symmetrical Chest Expansion and Respiratory Effort, Clear to 

Auscultation


Cardiovascular: NL Sounds; No Murmurs; No JVD, RRR


Abdominal: - - Obese, soft, NTND, BS+


Lymphatic: No Cervical Adenopathy


Extremities: - - Chronic RLE skin changes, L BKA with dressing in place, wound 

vac removed


Neurological: Alert and Oriented x 3


Result Diagrams: 


 06/29/17 14:30





 06/27/17 12:00


Microbiology and Other Data: 


 








Assess/Plan/Problems-Billing


Assessment: 


L stump revision in a 61 yo M with hx of HTN, DM, COPD, chronic lymphedema, DAVID 

and hx of osteomyelitis of LLE s/p BKA








- Patient Problems


(1) Amputation stump infection


Current Visit: Yes   Comment: s/p revision by Dr. Kuhn on 6/26. Appreciate ID 

assistance, continue Vanc/Cefepime (switched from Zosyn). Wound vac removed by 

Dr. Kuhn on 6/29. 1+ VRE on culture, will defer to Dr. Mejia to adjust ABx 

if organism felt to be contributing   





(2) Chronic pain


Current Visit: No   Comment: Continue home dose of Oxycontin 30 mg BID and 

Oxycodone 20 mg q4h prn. Stop time set for PCA   





(3) Nausea


Current Visit: Yes   Comment: diaphoresis. Unclear etiology. No fever. WBC 

normal. Zofran prn. Monitor.    





(4) Diabetes


Current Visit: No   Comment: BGs elevated today. Increase Lantus to 80 units 

BID (home dose) with HISS.    





(5) DAVID (obstructive sleep apnea)


Current Visit: No   Comment: Continue home BiPAP.   





(6) HTN (hypertension)


Current Visit: No   Comment: Continue Norvasc and Atenolol. Holding Lisinopril.

    





(7) Lymphedema


Current Visit: No   Comment: Chronic, likely related to RV and diastolic 

failure. Continue Lasix.   





(8) DVT prophylaxis


Current Visit: No   Comment: Lovenox/SCD

## 2017-06-30 NOTE — PN
Progress Note





- Progress Note


Date of Service: 06/30/17


SOAP: 


Subjective:


61 y/o male s/p L BKA 6/5, revision 6/26 for infection.   Patient reports 

itching improved, having electrical shock pain with dressing changes at base of 

stump.  VSS, afebrile. 








Objective:


General-  SItting in chair well, nAD 


MSK- Dressing changed, minimal bleeding from lateral stump, retention sutures 

in place, mild to moderate swelling, no odor/ drainage noted.   





 Vital Signs











Temp  98.5 F   06/30/17 03:40


 


Pulse  73   06/30/17 03:59


 


Resp  16   06/30/17 05:25


 


BP  130/61   06/30/17 03:59


 


Pulse Ox  98   06/30/17 04:00








 Intake & Output











 06/29/17 06/30/17 06/30/17





 18:59 06:59 18:59


 


Intake Total 1390 1800 


 


Output Total 2275 1050 


 


Balance -885 750 


 


Intake:   


 


  IV Fluids 100 750 


 


    ABX - VANCOMYCIN  500 


 


    NS (0.9%) 100 250 


 


  IVPB 250  


 


    ABX - VANCOMYCIN 250  


 


  Oral 1040 1050 


 


Output:   


 


  Urine 2275 1050 


 


Other:   


 


  # Bowel Movements  1 


 


  Estimated Stool Amount  Large 








 Laboratory Results - last 24 hr











  06/29/17 06/29/17 06/29/17





  11:45 13:32 14:30


 


WBC    9.2


 


RBC    3.98 L


 


Hgb    9.8 L


 


Hct    32 L


 


MCV    80


 


MCH    25 L


 


MCHC    31


 


RDW    17 H


 


Plt Count    235


 


MPV    8


 


Neut % (Auto)    73.8


 


Lymph % (Auto)    11.1 L


 


Mono % (Auto)    8.6


 


Eos % (Auto)    5.4


 


Baso % (Auto)    1.1


 


Absolute Neuts (auto)    6.8


 


Absolute Lymphs (auto)    1.0


 


Absolute Monos (auto)    0.8


 


Absolute Eos (auto)    0.5


 


Absolute Basos (auto)    0.1


 


Absolute Nucleated RBC    0


 


Nucleated RBC %    0


 


BUN   


 


Creatinine   


 


Est GFR ( Amer)   


 


Est GFR (Non-Af Amer)   


 


POC Glucose (mg/dL)  301 H  300 H 


 


Vancomycin Trough   














  06/29/17 06/29/17 06/30/17





  17:03 21:22 05:13


 


WBC   


 


RBC   


 


Hgb   


 


Hct   


 


MCV   


 


MCH   


 


MCHC   


 


RDW   


 


Plt Count   


 


MPV   


 


Neut % (Auto)   


 


Lymph % (Auto)   


 


Mono % (Auto)   


 


Eos % (Auto)   


 


Baso % (Auto)   


 


Absolute Neuts (auto)   


 


Absolute Lymphs (auto)   


 


Absolute Monos (auto)   


 


Absolute Eos (auto)   


 


Absolute Basos (auto)   


 


Absolute Nucleated RBC   


 


Nucleated RBC %   


 


BUN    10


 


Creatinine    0.67


 


Est GFR ( Amer)    155.6


 


Est GFR (Non-Af Amer)    121.0


 


POC Glucose (mg/dL)  211 H  252 H 


 


Vancomycin Trough    13.2














  06/30/17





  08:00


 


WBC 


 


RBC 


 


Hgb 


 


Hct 


 


MCV 


 


MCH 


 


MCHC 


 


RDW 


 


Plt Count 


 


MPV 


 


Neut % (Auto) 


 


Lymph % (Auto) 


 


Mono % (Auto) 


 


Eos % (Auto) 


 


Baso % (Auto) 


 


Absolute Neuts (auto) 


 


Absolute Lymphs (auto) 


 


Absolute Monos (auto) 


 


Absolute Eos (auto) 


 


Absolute Basos (auto) 


 


Absolute Nucleated RBC 


 


Nucleated RBC % 


 


BUN 


 


Creatinine 


 


Est GFR ( Amer) 


 


Est GFR (Non-Af Amer) 


 


POC Glucose (mg/dL)  182 H


 


Vancomycin Trough 

















Assessment:


61 y/o male s/p L BKA 6/5, revision 6/26 for infection.  








Plan:


- Dr. Mejia following for ID- continue Vanco, cefepime--> may switch to 

Linezolid if no wound improvement 


- DVT prophylaxis- lovenox 


- Continue Wound care BID 


- PT/ OT 











   


 Active Medications











Generic Name Dose Route Start Last Admin





  Trade Name Freq  PRN Reason Stop Dose Admin


 


Al Hydrox/Mg Hydrox/Simethicone  30 ml  06/27/17 10:10  





  Maalox Plus*  PO   





  Q4H PRN   





  INDIGESTION   


 


Amlodipine Besylate  5 mg  06/26/17 21:00  06/29/17 21:18





  Norvasc Tab*  PO   5 mg





  BEDTIME ERNST   Administration


 


Atenolol  50 mg  06/27/17 09:00  06/29/17 08:29





  Tenormin Tab*  PO   50 mg





  QAM ERNST   Administration


 


Atorvastatin Calcium  20 mg  06/26/17 17:00  06/29/17 17:02





  Lipitor*  PO   20 mg





  1700 ERNST   Administration


 


Calcium Carbonate  1,000 mg  06/26/17 16:08  06/29/17 12:45





  Tums*  PO   1,000 mg





  BID PRN   Administration





  INDIGESTION   


 


Cetirizine HCl  10 mg  06/27/17 09:00  06/29/17 08:22





  Zyrtec*  PO   10 mg





  DAILY ERNST   Administration





  Protocol   


 


Cholecalciferol  400 unit  06/27/17 09:00  06/29/17 08:22





  Vitamin D Tab*  PO   400 unit





  DAILY ERNST   Administration


 


Dextrose  12.5 gm  06/26/17 19:01  





  D50w Syringe 50 Ml*  IV PUSH   





  .FOR FS < 60 - SS PRN   





  FS < 60   


 


Diphenhydramine HCl  25 mg  06/26/17 15:55  





  Benadryl Iv*  IV   





  Q6H PRN   





  itching   


 


Docusate Sodium  100 mg  06/26/17 21:00  06/29/17 21:18





  Colace Cap*  PO   100 mg





  BID ERNST   Administration


 


Enoxaparin Sodium  40 mg  06/27/17 09:00  06/29/17 08:29





  Lovenox(*)  SUBCUT   40 mg





  Q24H ERNST   Administration


 


Furosemide  20 mg  06/27/17 09:00  06/29/17 08:22





  Lasix Tab*  PO   20 mg





  DAILY ERNST   Administration


 


Heparin Sodium (Porcine)  1 ml  06/27/17 18:00  06/30/17 05:27





  Heparin Flush Picc/Ml/Cvc(*)  FLUSH   1 ml





  0600,1800 ERNST   Administration





  Protocol   


 


Hydroxyzine HCl  25 mg  06/29/17 15:24  06/30/17 04:04





  Atarax Tab*  PO   25 mg





  Q4H PRN   Administration





  ITCHING   


 


Vancomycin HCl 1,250 mg/  250 mls @ 166.667 mls/hr  06/27/17 06:00  06/30/17 06:

12





  Sodium Chloride  IVPB   166.667 mls/hr





  Q8H ERNST   Administration


 


Insulin Glargine  80 units  06/29/17 21:00  06/29/17 21:38





  Lantus(*)  SUBCUT   80 units





  BID ERNST   Administration


 


Insulin Human Lispro  0 - 15 units  06/27/17 07:30  06/29/17 17:11





  Humalog*  SUBCUT   6 unit





  [facility]   Administration





  Protocol   


 


Insulin Human Lispro  0 - 15 units  06/29/17 16:30  06/29/17 17:12





  Humalog*  SUBCUT   Not Given





  AC [facility]   





  Protocol   


 


Lactobacillus Rhamnosus  1 cap  06/26/17 21:00  06/29/17 21:17





  Culturelle*  PO   1 cap





  BID ERNST   Administration


 


Multivitamins  1 tab  06/27/17 09:00  06/29/17 08:23





  Theragran Tab*  PO   1 tab





  DAILY ERNST   Administration


 


Nystatin  1 applic  06/26/17 21:00  06/29/17 21:22





  Nystatin Top Powder*  TOPICAL   1 applic





  BID ERNST   Administration


 


Omeprazole  20 mg  06/27/17 07:30  06/29/17 08:22





  Prilosec Cap*  PO   20 mg





  DAILY@0730 ERNST   Administration


 


Ondansetron HCl  4 mg  06/26/17 15:55  06/29/17 14:43





  Zofran Inj*  IV   4 mg





  Q6H PRN   Administration





  nausea   


 


Oxycodone HCl  20 mg  06/27/17 07:35  06/30/17 03:53





  Roxycodone Tab*  PO   20 mg





  Q4H PRN   Administration





  PAIN   


 


Oxycodone HCl  30 mg  06/27/17 11:00  06/29/17 21:17





  Oxycontin(*)  PO   30 mg





  BID ERNST   Administration


 


Pharmacy Consult  1 note  06/26/17 18:10  





  Vancomycin Per Pharmacy*  FOLLOW UP   





  . PRN   





  PER PROTOCOL   


 


Trazodone HCl  25 mg  06/26/17 15:55  06/27/17 01:08





  Desyrel Tab*  PO   25 mg





  BEDTIME PRN   Administration





  insomnia   


 


Vitamin B Complex/Vitamin E  1 tab  06/27/17 09:00  06/29/17 08:23





  Complex B-100*  PO   1 tab





  DAILY ERNST   Administration

## 2017-06-30 NOTE — PN
PROGRESS NOTE:

 

DATE OF SERVICE:  06/29/17

 

SUBJECTIVE:  Shane is seen on 06/29/27.  He is feeling better overall.  He is 
on vancomycin and cefepime.  He has couple of organisms growing out of his most 
recent debridement.  Today, I have changed his dressing on his left stump.  The 
VAC was discontinued and has had significant amount of drainage, but at this 
point the erythema is much improved along the wound.  Skin edges are apposing 
well.  So, we will switch to a dry dressing as there is some maceration along 
the skin edges. This will be changed twice a day and he is to continue in the 
hospital here until we see really essentially complete wound healing of his 
stump.

 

 [id number]/CPS #: [id number]

MYA

## 2017-07-01 NOTE — PN
Subjective


Date of Service: 07/01/17


Interval History: 





Patient seen this afternoon. Had another episode of nausea and diaphoresis 

earlier today. He states it lasted about 10-15 mins and resolved. Similar to 

prior episode but no abdominal pain today. Otherwise doing OK, wife brought in 

some tonic water for cramping he was having. 


Family History: Unchanged from Admission


Social History: Unchanged from Admission


Past Medical History: Unchanged from Admission





Objective


Active Medications: 








Al Hydrox/Mg Hydrox/Simethicone (Maalox Plus*)  30 ml PO Q4H PRN


Amlodipine Besylate (Norvasc Tab*)  5 mg PO BEDTIME [facility]


Atenolol (Tenormin Tab*)  50 mg PO QAM [facility]


Atorvastatin Calcium (Lipitor*)  20 mg PO 1700 [facility]


Calcium Carbonate (Tums*)  1,000 mg PO BID PRN


Cetirizine HCl (Zyrtec*)  10 mg PO DAILY [facility]


Cholecalciferol (Vitamin D Tab*)  400 unit PO DAILY [facility]


Dextrose (D50w Syringe 50 Ml*)  12.5 gm IV PUSH .FOR FS < 60 - SS PRN


Diphenhydramine HCl (Benadryl Iv*)  25 mg IV Q6H PRN


Docusate Sodium (Colace Cap*)  100 mg PO BID [facility]


Enoxaparin Sodium (Lovenox(*))  40 mg SUBCUT Q24H [facility]


Furosemide (Lasix Tab*)  20 mg PO DAILY [facility]


Heparin Sodium (Porcine) (Heparin Flush Picc/Ml/Cvc(*))  1 ml FLUSH 0600,1800 

[facility]


Hydroxyzine HCl (Atarax Tab*)  25 mg PO Q4H PRN


Cefepime HCl 2 gm/ Sodium (Chloride)  50 mls @ 100 mls/hr IVPB Q12H [facility]


Vancomycin HCl 1,000 mg/ (Sodium Chloride)  250 mls @ 166.667 mls/hr IVPB Q6H 

[facility]


Insulin Glargine (Lantus(*))  80 units SUBCUT BID [facility]


Insulin Human Lispro (Humalog*)  0 - 15 units SUBCUT AC [facility]


Insulin Human Lispro (Humalog*)  0 - 15 units SUBCUT AC [facility]


Lactobacillus Rhamnosus (Culturelle*)  1 cap PO BID [facility]


Multivitamins (Theragran Tab*)  1 tab PO DAILY [facility]


Nystatin (Nystatin Top Powder*)  1 applic TOPICAL BID [facility]


Omeprazole (Prilosec Cap*)  20 mg PO DAILY@0730 [facility]


Ondansetron HCl (Zofran Inj*)  4 mg IV Q6H PRN


Oxycodone HCl (Roxycodone Tab*)  30 mg PO Q4H PRN


Oxycodone HCl (Oxycontin(*))  40 mg PO BID [facility]


Pharmacy Consult (Vancomycin Per Pharmacy*)  1 note FOLLOW UP . PRN


Pharmacy Profile Note (Vancomycin Trough Check)  1 note FOLLOW UP 1130 ONE


Trazodone HCl (Desyrel Tab*)  25 mg PO BEDTIME PRN


Vitamin B Complex/Vitamin E (Complex B-100*)  1 tab PO DAILY [facility]





 Vital Signs











  06/30/17 06/30/17 06/30/17





  17:33 19:18 19:43


 


Temperature   99.8 F


 


Pulse Rate   85


 


Respiratory 16 14 16





Rate   


 


Blood Pressure   140/42





(mmHg)   


 


O2 Sat by Pulse   92





Oximetry   














  06/30/17 07/01/17 07/01/17





  23:33 00:07 02:50


 


Temperature  99.7 F 


 


Pulse Rate  76 


 


Respiratory 15 18 16





Rate   


 


Blood Pressure  144/83 





(mmHg)   


 


O2 Sat by Pulse  91 





Oximetry   














  07/01/17 07/01/17 07/01/17





  03:49 04:50 06:55


 


Temperature 99.3 F  


 


Pulse Rate 68  


 


Respiratory 18 16 16





Rate   


 


Blood Pressure 147/65  





(mmHg)   


 


O2 Sat by Pulse 93  





Oximetry   














  07/01/17 07/01/17 07/01/17





  07:39 08:15 08:55


 


Temperature 98.3 F  


 


Pulse Rate 83  


 


Respiratory 20 16 18





Rate   


 


Blood Pressure 107/77  





(mmHg)   


 


O2 Sat by Pulse 96  





Oximetry   














  07/01/17 07/01/17 07/01/17





  10:55 11:03 12:55


 


Temperature  98.1 F 


 


Pulse Rate  71 


 


Respiratory 16 20 20





Rate   


 


Blood Pressure  125/67 





(mmHg)   


 


O2 Sat by Pulse  93 





Oximetry   











Oxygen Devices in Use Now: Nasal Cannula


Appearance: Middle-aged,  M, laying in bed in NAD


Eyes: No Scleral Icterus


Ears/Nose/Mouth/Throat: Mucous Membranes Moist


Neck: NL Appearance and Movements; NL JVP


Respiratory: Symmetrical Chest Expansion and Respiratory Effort, Clear to 

Auscultation


Cardiovascular: NL Sounds; No Murmurs; No JVD, RRR


Abdominal: - - Obese, soft, NTND, ventral hernia, BS+


Lymphatic: No Cervical Adenopathy


Extremities: - - Minimal LE edema, LLE BKA with dressing over stump, c/d/i


Skin: No Rash or Ulcers


Neurological: Alert and Oriented x 3


Result Diagrams: 


 06/29/17 14:30





 06/30/17 05:13


Microbiology and Other Data: 


 








Assess/Plan/Problems-Billing


Assessment: 


L stump revision in a 61 yo M with hx of HTN, DM, COPD, chronic lymphedema, DAVID 

and hx of osteomyelitis of LLE s/p BKA








- Patient Problems


(1) Amputation stump infection


Current Visit: Yes   Comment: s/p revision by Dr. Kuhn on 6/26. Appreciate ID 

assistance, continue Vanc/Cefepime (switched from Zosyn). Wound vac removed by 

Dr. Kuhn on 6/29.   





(2) Chronic pain


Current Visit: No   Comment: Increased Oxycontin 40 mg BID and Oxycodone 30 mg 

q4h prn. Weaned off of PCA   





(3) Nausea


Current Visit: Yes   Comment: diaphoresis. Recurred. Checked TSH which was 

normal. Will check troponin, EKG. Can get EKG if symptoms recur again   





(4) Diabetes


Current Visit: No   Comment: BGs elevated. Continue Lantus 80 units BID (home 

dose) with increased HISS.    





(5) DAVID (obstructive sleep apnea)


Current Visit: No   Comment: Continue home BiPAP.   





(6) HTN (hypertension)


Current Visit: No   Comment: Continue Norvasc and Atenolol. Holding Lisinopril.

    





(7) Lymphedema


Current Visit: No   Comment: Chronic, likely related to RV and diastolic 

failure. Continue Lasix.   





(8) DVT prophylaxis


Current Visit: No   Comment: Lovenox/SCD

## 2017-07-01 NOTE — PN
Progress Note





- Progress Note


Date of Service: 07/01/17


SOAP: 


Subjective:


61 y/o male s/p L BKA 6/5, revision 6/26 for infection.   Patient reports  

having electrical shock pain at base of stump, describes some muscle spasms.  

VSS, afebrile. 








Objective:


General-  Laying in bed, NAD, A&O


MSK- Dressing is c/d/i. Pt is able to bed the knee. Sensation is intact to 

light touch. 





 Vital Signs











Temp  98.3 F   07/01/17 07:39


 


Pulse  83   07/01/17 07:39


 


Resp  16   07/01/17 08:15


 


BP  107/77   07/01/17 07:39


 


Pulse Ox  96   07/01/17 07:39








 Intake & Output











 06/30/17 07/01/17 07/01/17





 18:59 06:59 18:59


 


Intake Total 595 2843 


 


Output Total 200 725 200


 


Balance 395 2118 -200


 


Intake:   


 


  IV Fluids 330 943 


 


    ABX - VANCOMYCIN  763 


 


    NS (0.9%) 330 180 


 


  IVPB 265  


 


    ABX - VANCOMYCIN 265  


 


  Oral  1900 


 


Output:   


 


  Urine 200 725 200


 


Other:   


 


  Estimated Void Medium  


 


  Date of Last Bowel 6/30/17 6/30/17 





  Movement   


 


  # Bowel Movements  1 


 


  Estimated Stool Amount Medium Medium 


























Assessment:


61 y/o male s/p L BKA 6/5, revision 6/26 for infection.  








Plan:


- Dr. Mejia following for ID- continue abx management  


- DVT prophylaxis- lovenox 


- Continue Wound care BID 


- PT/ OT 


- Dressing change by provider tomorrow

## 2017-07-02 NOTE — PN
Progress Note





- Progress Note


Date of Service: 07/02/17


SOAP: 


Subjective:


POD #6 Left leg stump revision. Doing well, c/o less pain. Denies CP/SOB, f/c.








Objective:


Vitals: 











Temp Pulse Resp BP Pulse Ox


 


 97.9 F   81   18   132/70   92 


 


 07/02/17 07:32  07/02/17 07:32  07/02/17 07:32  07/02/17 07:32  07/02/17 08:24








Gen: A&Ox3, NAD sitting in chair


LLE: Incision healing well, mild maceration to mid portion. Edema improved. 

Sensation intact





Labs:


 Laboratory Results - last 24 hr











  07/01/17 07/01/17 07/01/17





  08:24 12:21 13:00


 


POC Glucose (mg/dL)  132 H  253 H 


 


Troponin I   


 


TSH    1.95














  07/01/17 07/01/17 07/01/17





  16:35 17:15 21:56


 


POC Glucose (mg/dL)  199 H   175 H


 


Troponin I   0.01 


 


TSH   














  07/02/17





  07:15


 


POC Glucose (mg/dL)  117 H


 


Troponin I 


 


TSH 








Assessment:


POD #6 Left leg stump revision





Plan:


Betadine dressing applied today. Will keep in place until tomorrow


Continue IV abx per ID

## 2017-07-03 NOTE — PN
Progress Note





- Progress Note


Date of Service: 07/03/17


SOAP: 


Subjective:


CC: leg infection


HPI: 63 yo man with recent left BKA for chronic osteomyelitis, had wound 

necrosis, readmitted for stump revision.  Feels well, no rash or diarrhea.  

Burning pain at surgical site.  Incision intact, no drainage he's aware of.  








Objective:


[]


 Vital Signs











Temp  37.1 C   07/03/17 11:31


 


Pulse  70   07/03/17 15:19


 


Resp  18   07/03/17 16:08


 


BP  129/66   07/03/17 15:19


 


Pulse Ox  94   07/03/17 16:00








 Intake & Output











 07/02/17 07/03/17 07/03/17





 18:59 06:59 18:59


 


Intake Total 2641 2204 1428


 


Output Total 1175 1000 775


 


Balance 1466 1204 653


 


Intake:   


 


  IV Fluids 30 704 


 


    ABX - VANCOMYCIN  539 


 


    Cefepime  55 


 


    NS (0.9%) 30 110 


 


  IVPB 651  708


 


    ABX - VANCOMYCIN 536  620


 


    Cefepime 55  


 


    NS (0.9%) 60  88


 


  Oral 1960 1500 720


 


Output:   


 


  Urine 1175 1000 775


 


Other:   


 


  Date of Last Bowel  7/2/17 





  Movement   


 


  # Bowel Movements 1 1 


 


  Estimated Stool Amount Small Medium Small








 


Gen:Awake, no distress


HEENT:PERRL, MMM


Neck:Supple


Heart:RRR no murmur


Lungs:CTA BL


Abd:+BS NTND soft


Skin: no rash


MSK: L leg stump incision intact trace erythema


 


Assessment:


1. L leg stump necrosis s/p revision


2. chronic osteomyelitis of left calcaneous s/p BKA


3. morbid obesity and lymphedema


4. diabetes with neuropathy


5. elevated crp, improving





Plan:


1. continue vancomycin goal tr 15-20, and cefepime day 28/42


I am away until 7/10 and can see him then at [facility] or in [place] as outpatient if he 

leaves before then, in that case please arrange appt with my office at discharge

## 2017-07-03 NOTE — PN
Progress Note





- Progress Note


Date of Service: 07/03/17


SOAP: 


Subjective:


POD #7 left leg stump revision. Doing well. States that he is still having some 

burning pain to distal stump. Denies CP/SOB.








Objective:


Vitals: 











Temp Pulse Resp BP Pulse Ox


 


 98.8 F   71   18   108/62   95 


 


 07/03/17 11:31  07/03/17 11:31  07/03/17 12:06  07/03/17 11:31  07/03/17 11:31








Gen: A&O x3, NAD rest


LLE: Incision C/D, small amount of wound dehiscence but improved from 

yesterday. No significant edema. Sensation intact





Labs: 


 Laboratory Results - last 24 hr











  07/02/17 07/02/17 07/02/17





  12:19 17:04 20:58


 


BUN   


 


Creatinine   


 


Est GFR ( Amer)   


 


Est GFR (Non-Af Amer)   


 


POC Glucose (mg/dL)  173 H  150 H  246 H


 


C-Reactive Protein   


 


Vancomycin Trough   














  07/03/17 07/03/17 07/03/17





  05:42 07:43 11:15


 


BUN  11  


 


Creatinine  0.71  


 


Est GFR ( Amer)  145.5  


 


Est GFR (Non-Af Amer)  113.2  


 


POC Glucose (mg/dL)   120 H 


 


C-Reactive Protein  38.79 H   34.47 H


 


Vancomycin Trough    18.0














  07/03/17





  12:03


 


BUN 


 


Creatinine 


 


Est GFR ( Amer) 


 


Est GFR (Non-Af Amer) 


 


POC Glucose (mg/dL)  283 H


 


C-Reactive Protein 


 


Vancomycin Trough 











Assessment:


POD #7 Left leg stump revision





Plan:


New betadine WTD dressing applied. Continue elevation frequently


Continue IV abx

## 2017-07-04 NOTE — PN
Subjective


Date of Service: 07/04/17


Interval History: 





Patient seen this afternoon. No complaints. Pain seems well controlled. No 

fever or chills. 


Family History: Unchanged from Admission


Social History: Unchanged from Admission


Past Medical History: Unchanged from Admission





Objective


Active Medications: 








Al Hydrox/Mg Hydrox/Simethicone (Maalox Plus*)  30 ml PO Q4H PRN


   PRN Reason: INDIGESTION


Amlodipine Besylate (Norvasc Tab*)  5 mg PO BEDTIME [facility]


   Last Admin: 07/03/17 20:38 Dose:  5 mg


Atenolol (Tenormin Tab*)  50 mg PO QAM [facility]


   Last Admin: 07/04/17 09:14 Dose:  50 mg


Atorvastatin Calcium (Lipitor*)  20 mg PO 1700 [facility]


   Last Admin: 07/03/17 17:31 Dose:  20 mg


Calcium Carbonate (Tums*)  1,000 mg PO BID PRN


   PRN Reason: INDIGESTION


   Last Admin: 07/03/17 21:22 Dose:  1,000 mg


Cetirizine HCl (Zyrtec*)  10 mg PO DAILY [facility]


   PRN Reason: Protocol


   Last Admin: 07/04/17 09:14 Dose:  10 mg


Cholecalciferol (Vitamin D Tab*)  400 unit PO DAILY [facility]


   Last Admin: 07/04/17 09:14 Dose:  400 unit


Dextrose (D50w Syringe 50 Ml*)  12.5 gm IV PUSH .FOR FS < 60 - SS PRN


   PRN Reason: FS < 60


Diphenhydramine HCl (Benadryl Iv*)  25 mg IV Q6H PRN


   PRN Reason: itching


Docusate Sodium (Colace Cap*)  100 mg PO BID [facility]


   Last Admin: 07/04/17 09:14 Dose:  100 mg


Enoxaparin Sodium (Lovenox(*))  40 mg SUBCUT Q24H [facility]


   Last Admin: 07/04/17 09:18 Dose:  40 mg


Furosemide (Lasix Tab*)  20 mg PO DAILY [facility]


   Last Admin: 07/04/17 09:14 Dose:  20 mg


Heparin Sodium (Porcine) (Heparin Flush Picc/Ml/Cvc(*))  1 ml FLUSH 0600,1800 

[facility]


   PRN Reason: Protocol


   Last Admin: 07/04/17 06:12 Dose:  Not Given


Hydroxyzine HCl (Atarax Tab*)  25 mg PO Q4H PRN


   PRN Reason: ITCHING


   Last Admin: 07/04/17 10:50 Dose:  25 mg


Cefepime HCl 2 gm/ Sodium (Chloride)  50 mls @ 100 mls/hr IVPB Q12H [facility]


   Last Admin: 07/04/17 09:17 Dose:  100 mls/hr


Vancomycin HCl 1,000 mg/ (Sodium Chloride)  250 mls @ 166.667 mls/hr IVPB Q6H 

[facility]


   Last Admin: 07/04/17 12:00 Dose:  166.667 mls/hr


Insulin Glargine (Lantus(*))  80 units SUBCUT BID [facility]


   Last Admin: 07/04/17 09:18 Dose:  80 units


Insulin Human Lispro (Humalog*)  0 - 15 units SUBCUT AC [facility]


   PRN Reason: Protocol


   Last Admin: 07/04/17 07:51 Dose:  Not Given


Insulin Human Lispro (Humalog*)  0 - 15 units SUBCUT AC [facility]


   PRN Reason: Protocol


   Last Admin: 07/04/17 09:17 Dose:  12 unit


Lactobacillus Rhamnosus (Culturelle*)  1 cap PO BID [facility]


   Last Admin: 07/04/17 09:14 Dose:  1 cap


Multivitamins (Theragran Tab*)  1 tab PO DAILY [facility]


   Last Admin: 07/04/17 09:14 Dose:  1 tab


Nystatin (Nystatin Top Powder*)  1 applic TOPICAL BID [facility]


   Last Admin: 07/04/17 09:28 Dose:  1 applic


Omeprazole (Prilosec Cap*)  20 mg PO DAILY@0730 [facility]


   Last Admin: 07/04/17 07:36 Dose:  20 mg


Ondansetron HCl (Zofran Inj*)  4 mg IV Q6H PRN


   PRN Reason: nausea


   Last Admin: 06/29/17 14:43 Dose:  4 mg


Oxycodone HCl (Roxycodone Tab*)  30 mg PO Q4H PRN


   PRN Reason: PAIN


   Last Admin: 07/04/17 10:49 Dose:  30 mg


Oxycodone HCl (Oxycontin(*))  40 mg PO BID [facility]


   Last Admin: 07/04/17 09:13 Dose:  40 mg


Pharmacy Consult (Vancomycin Per Pharmacy*)  1 note FOLLOW UP . PRN


   PRN Reason: PER PROTOCOL


Pharmacy Profile Note (Vancomycin Trough Check)  1 note FOLLOW UP 0530 [facility]


   Stop: 07/06/17 05:31


Trazodone HCl (Desyrel Tab*)  25 mg PO BEDTIME PRN


   PRN Reason: insomnia


   Last Admin: 06/27/17 01:08 Dose:  25 mg


Vitamin B Complex/Vitamin E (Complex B-100*)  1 tab PO DAILY [facility]


   Last Admin: 07/04/17 09:14 Dose:  1 tab








 Vital Signs











  07/03/17 07/03/17 07/03/17





  14:08 15:19 15:29


 


Temperature   98.0 F


 


Pulse Rate  70 


 


Respiratory 18 20 





Rate   


 


Blood Pressure  129/66 





(mmHg)   


 


O2 Sat by Pulse  94 





Oximetry   














  07/03/17 07/04/17 07/04/17





  23:34 00:06 02:35


 


Temperature 98.1 F  


 


Pulse Rate 73  


 


Respiratory 16 18 16





Rate   


 


Blood Pressure 121/53  





(mmHg)   


 


O2 Sat by Pulse 94  





Oximetry   














  07/04/17 07/04/17 07/04/17





  11:13 12:23 12:30


 


Temperature  97.2 F 


 


Pulse Rate  60 


 


Respiratory 16 16 





Rate   


 


Blood Pressure  125/61 





(mmHg)   


 


O2 Sat by Pulse  97 97





Oximetry   











Oxygen Devices in Use Now: Nasal Cannula


Appearance: Middle-aged, obese,  M, laying in chair in NAD


Eyes: No Scleral Icterus


Ears/Nose/Mouth/Throat: Mucous Membranes Moist


Neck: NL Appearance and Movements; NL JVP


Respiratory: Symmetrical Chest Expansion and Respiratory Effort, Clear to 

Auscultation


Cardiovascular: NL Sounds; No Murmurs; No JVD, RRR


Abdominal: - - Obese, soft, non-tender


Extremities: - - Minimal LE edema, chronic RLE skin changes, L BKA with 

dressing in place


Neurological: Alert and Oriented x 3


Result Diagrams: 


 06/29/17 14:30





 07/03/17 05:42


Microbiology and Other Data: 


 








Assess/Plan/Problems-Billing


Assessment: 


L stump revision in a 61 yo M with hx of HTN, DM, COPD, chronic lymphedema, DAVID 

and hx of osteomyelitis of LLE s/p BKA








- Patient Problems


(1) Amputation stump infection


Current Visit: Yes   Comment: s/p revision by Dr. Kuhn on 6/26. Appreciate ID 

assistance, continue Vanc/Cefepime (switched from Zosyn). Wound vac removed by 

Dr. Kuhn on 6/29. Plan from ortho to observe in the hospital until patient is 

far along in healing process   





(2) Chronic pain


Current Visit: No   Comment: Continue increased Oxycontin 40 mg BID and 

Oxycodone 30 mg q4h prn. Weaned off of PCA   





(3) Diabetes


Current Visit: No   Comment: Continue Lantus 80 units BID (home dose) with 

HISS.    





(4) DAVID (obstructive sleep apnea)


Current Visit: No   Comment: Continue home BiPAP.   





(5) HTN (hypertension)


Current Visit: No   Comment: Continue Norvasc and Atenolol. Holding Lisinopril. 

BPs maintaining. If he becomes HTN can restart Lisinopril.    





(6) Lymphedema


Current Visit: No   Comment: Chronic, likely related to RV and diastolic 

failure. Continue Lasix.   





(7) DVT prophylaxis


Current Visit: No   Comment: Lovenox/SCD   


Status and Disposition: 





Will sign off at this time. Please contact if you have any further questions

## 2017-07-04 NOTE — PN
Progress Note





- Progress Note


Date of Service: 07/04/17


SOAP: 


Subjective:


[Pt is doing well. He does have some pain but it is controlled.  He denies CP, 

SOB, calf pain or f/c.








Objective:


62 M NAD, sitting comfortably in bed


LLE- dressing c/d/i, knee nontender to palpation, no warmth or erythema


RLE- swelling improved, chronic skin changes, +DF/PF, NVI





 Vital Signs











Temp Pulse Resp BP Pulse Ox


 


 97.9 F   65   16   121/63   96 


 


 07/04/17 07:46  07/04/17 07:46  07/04/17 07:46  07/04/17 07:46  07/04/17 07:46








 Laboratory Results - last 24 hr











  07/03/17 07/03/17 07/03/17





  05:42 07:43 11:15


 


POC Glucose (mg/dL)   120 H 


 


C-Reactive Protein  38.79 H   34.47 H


 


Vancomycin Trough    18.0














  07/03/17 07/03/17 07/03/17





  12:03 17:08 20:36


 


POC Glucose (mg/dL)  283 H  154 H  140 H


 


C-Reactive Protein   


 


Vancomycin Trough   














  07/04/17





  07:41


 


POC Glucose (mg/dL)  118 H


 


C-Reactive Protein 


 


Vancomycin Trough 

















Assessment:


POD 8 L leg stump revision








Plan:


Cont IV abx per ID


Dressing change tomorrow

## 2017-07-05 NOTE — PN
Progress Note





- Progress Note


Date of Service: 07/05/17


SOAP: 


Subjective:


[]Patient seen OOB in chair.  Feels that his stump pain is improving some. 

States his sugars have been slightly better as well. 








Objective:


[]


 Vital Signs











Temp  98.1 F   07/05/17 04:28


 


Pulse  70   07/05/17 04:28


 


Resp  18   07/05/17 09:53


 


BP  119/58   07/05/17 04:28


 


Pulse Ox  98   07/05/17 04:28








 Intake & Output











 07/04/17 07/05/17 07/05/17





 18:59 06:59 18:59


 


Intake Total 1090 3962 270


 


Output Total 800 400 200


 


Balance 290 3562 70


 


Intake:   


 


  IV Fluids  1202 270


 


    ABX - VANCOMYCIN   270


 


    NS (0.9%)  1202 


 


  IVPB 330 60 


 


    ABX - VANCOMYCIN 270  


 


    Cefepime 60 60 


 


  Oral 760 2700 


 


Output:   


 


  Urine 800 400 200








 Laboratory Results - last 24 hr











  07/04/17 07/04/17 07/04/17





  12:04 16:18 21:19


 


POC Glucose (mg/dL)  287 H  235 H  216 H














  07/05/17





  07:33


 


POC Glucose (mg/dL)  155 H








Left stump dressings were removed.  Still with some maceration along mid 

portion of incision line with wet necrosis in 3 small areas. No foul odor. 








Assessment:


[]s/p revision infected BKA stump








Plan:


[]Continue with Wet to dry betadine dressing changes using gauze and NOT telfa, 

kerlex and ACE to help debride superficial wet necrosis.


   IV Cefepime and Vanco

## 2017-07-06 NOTE — PN
Progress Note





- Progress Note


Date of Service: 07/06/17


SOAP: 


Subjective:


[]Patient seen OOB in chair.  No changes orthopedically.  





Objective:


[]


 Vital Signs











Temp  97.9 F   07/06/17 07:54


 


Pulse  69   07/06/17 07:54


 


Resp  16   07/06/17 08:58


 


BP  133/59   07/06/17 07:54


 


Pulse Ox  92   07/06/17 07:54








 Intake & Output











 07/05/17 07/06/17 07/06/17





 18:59 06:59 18:59


 


Intake Total 2058 1751 


 


Output Total 675 1575 


 


Balance 1383 176 


 


Intake:   


 


  IV Fluids 678 60 


 


    ABX - VANCOMYCIN 540  


 


    Cefepime 100  


 


    NS (0.9%) 38 60 


 


  IVPB  611 


 


    ABX - VANCOMYCIN  541 


 


    Cefepime  70 


 


  Oral 1380 1080 


 


Output:   


 


  Urine 675 1575 








 Laboratory Results - last 24 hr











  07/05/17 07/05/17 07/05/17





  11:35 16:51 20:27


 


POC Glucose (mg/dL)  254 H  149 H  132 H


 


Vancomycin Trough   














  07/06/17





  05:34


 


POC Glucose (mg/dL) 


 


Vancomycin Trough  18.9








Left LLE stump dressing to be changed later today, no drainage through dressing








Assessment:


[]s/p revision infected BKA stump LLE 








Plan:


[]Change dressing with betadine soaked 4x4s, kerlex and ACE


   Continue IV Cefepime and Vanco

## 2017-07-07 NOTE — PN
Progress Note





- Progress Note


Date of Service: 07/07/17


SOAP: 


Subjective:


[]Patient seen OOB in chair.  In good spirits today. Feels that his pain 

continues to improve.








Objective:


[]


 Vital Signs











Temp  98.0 F   07/07/17 08:23


 


Pulse  71   07/07/17 08:23


 


Resp  16   07/07/17 09:01


 


BP  118/72   07/07/17 08:23


 


Pulse Ox  95   07/07/17 08:23








 Intake & Output











 07/06/17 07/07/17 07/07/17





 18:59 06:59 18:59


 


Intake Total 1900 3150 356


 


Output Total 200 1150 300


 


Balance 1700 2000 56


 


Intake:   


 


  IV Fluids 160 450 53


 


    ABX - VANCOMYCIN  450 


 


    ABX - ZOSYN 100  


 


    NS (0.9%) 60  53


 


  IVPB   183


 


    ABX - VANCOMYCIN   183


 


  Oral 1740 2700 120


 


Output:   


 


  Urine 200 1150 300


 


Other:   


 


  Date of Last Bowel  7/7/17 





  Movement   


 


  # Bowel Movements  1 


 


  Estimated Stool Amount  Large 








 Laboratory Results - last 24 hr











  07/06/17 07/06/17 07/06/17





  07:32 12:09 16:42


 


POC Glucose (mg/dL)  123 H  254 H  220 H














  07/06/17 07/07/17





  20:19 07:44


 


POC Glucose (mg/dL)  194 H  104








Dressings removed from LLE stump


Improving with less edema, and  much less wet necrosis, only 1 small area noted 

mid incision


new betadine soaked 4x4s, kerlex and ACE wraps applied without difficulty 








Assessment:


[]s/p revision infected BKA LLE








Plan:


[]Continue current antibiotics-Cefepime and Vanco


   Daily betadine wet-dry dressing changes

## 2017-07-08 NOTE — PN
Progress Note





- Progress Note


Date of Service: 07/08/17


SOAP: 


Subjective:


POD #12 Left leg stump revision. Doing very well, pain decreased. Denies CP/SOB





Objective:


Vitals: 











Temp Pulse Resp BP Pulse Ox


 


 98.2 F   72   20   119/64   93 


 


 07/08/17 08:20  07/08/17 08:20  07/08/17 09:07  07/08/17 08:20  07/08/17 08:20








Gen: A&Ox3, NAD at rest sitting in chair


LLE: Incision healing well with sutures in place, 2 small areas of dehiscence 

to mid portion of wound measuring approx 1cm round. No d/c from wound, no 

significant edema or erythema to stump








Assessment:


POD #12 Left leg stump revision





Plan:


Continue daily betadine WTD dressing changes


Continue IV abx


D/C to rehab once wound is fully healed

## 2017-07-10 NOTE — PN
PROGRESS NOTE:

 

DATE OF SERVICE:  07/10/17

 

HISTORY AND HOSPITAL COURSE:  Shane is having minimal pain now in his left 
stump. Dressing is changed today and healing well, much less swelling, small 
open area at the distal mid third, but shallow and not particularly purulent.

 

We are going switch to a saline wet-to-dry dressing for his left stump.  
Continue IV antibiotic coverage and PICC line is in place.  Plan will be for 
discharge soon back to his referring rehab.  Nonweightbearing with b.i.d. 
saline dressings, left stump, and a followup with Dr. Kuhn in 7 to 10 days' 
time.

 

 [id number]/[facility] #: [id number]

MTDD

## 2017-07-10 NOTE — PN
Progress Note





- Progress Note


Date of Service: 07/10/17


Note: 





LLE stump dressings changed


still a small area or wet necrosis in mid portion of the incision, but overall 

improvement continues


no foul odor


New betadine soaked 4x4s, Kerlex and ACE wraps applied without difficulty

## 2017-07-10 NOTE — PN
Subjective


Date of Service: 07/10/17


Interval History: 





Due to patient complexity, hospital medicine asked to reconsult and continue 

following with patient. Nursing concern expressed for patient somnolence. I did 

see and examine the patient at bedside. He is sleeping but arouses very easily 

to voice. He reports that "I always dose off in the daytime." He also endorses 

being bored. Nursing stated that the patient woke up somewhat confused and 

remained very somnolent through his morning assessment.





He denies fever/chills, reports good pain control. Denies any other complaints.








Family History: Unchanged from Admission


Social History: Unchanged from Admission


Past Medical History: Unchanged from Admission





Objective


Active Medications: 








Al Hydrox/Mg Hydrox/Simethicone (Maalox Plus*)  30 ml PO Q4H PRN


   PRN Reason: INDIGESTION


Amlodipine Besylate (Norvasc Tab*)  5 mg PO BEDTIME [facility]


   Last Admin: 07/09/17 20:51 Dose:  5 mg


Atenolol (Tenormin Tab*)  50 mg PO QAM [facility]


   Last Admin: 07/10/17 09:05 Dose:  50 mg


Atorvastatin Calcium (Lipitor*)  20 mg PO 1700 [facility]


   Last Admin: 07/09/17 17:35 Dose:  20 mg


Calcium Carbonate (Tums*)  1,000 mg PO BID PRN


   PRN Reason: INDIGESTION


   Last Admin: 07/09/17 17:56 Dose:  1,000 mg


Cetirizine HCl (Zyrtec*)  10 mg PO DAILY [facility]


   PRN Reason: Protocol


   Last Admin: 07/10/17 08:38 Dose:  10 mg


Cholecalciferol (Vitamin D Tab*)  400 unit PO DAILY [facility]


   Last Admin: 07/10/17 08:38 Dose:  400 unit


Dextrose (D50w Syringe 50 Ml*)  12.5 gm IV PUSH .FOR FS < 60 - SS PRN


   PRN Reason: FS < 60


Diclofenac Sodium (Voltaren 1% Gel (Nf))  1 applic TOPICAL QID [facility]


   PRN Reason: Protocol


   Last Admin: 07/10/17 12:57 Dose:  Not Given


Diphenhydramine HCl (Benadryl Iv*)  25 mg IV Q6H PRN


   PRN Reason: itching


Docusate Sodium (Colace Cap*)  100 mg PO BID [facility]


   Last Admin: 07/10/17 08:39 Dose:  100 mg


Enoxaparin Sodium (Lovenox(*))  40 mg SUBCUT Q24H [facility]


   Last Admin: 07/10/17 08:42 Dose:  40 mg


Furosemide (Lasix Tab*)  20 mg PO DAILY [facility]


   Last Admin: 07/10/17 08:38 Dose:  20 mg


Heparin Sodium (Porcine) (Heparin Flush Picc/Ml/Cvc(*))  1 ml FLUSH 0600,1800 

[facility]


   PRN Reason: Protocol


   Last Admin: 07/10/17 05:51 Dose:  1 ml


Hydroxyzine HCl (Atarax Tab*)  25 mg PO Q4H PRN


   PRN Reason: ITCHING


   Last Admin: 07/10/17 11:11 Dose:  25 mg


Cefepime HCl 2 gm/ Sodium (Chloride)  50 mls @ 100 mls/hr IVPB Q12H [facility]


   Last Admin: 07/10/17 08:37 Dose:  100 mls/hr


Vancomycin HCl 1,250 mg/ (Sodium Chloride)  250 mls @ 166.667 mls/hr IVPB Q8H 

[facility]


   Last Admin: 07/10/17 05:51 Dose:  166.667 mls/hr


Insulin Glargine (Lantus(*))  80 units SUBCUT BID [facility]


   Last Admin: 07/10/17 09:22 Dose:  80 units


Insulin Human Lispro (Humalog*)  0 - 15 units SUBCUT AC [facility]


   PRN Reason: Protocol


   Last Admin: 07/10/17 13:02 Dose:  6 unit


Insulin Human Lispro (Humalog*)  0 - 15 units SUBCUT AC [facility]


   PRN Reason: Protocol


   Last Admin: 07/10/17 13:02 Dose:  14 unit


Lactobacillus Rhamnosus (Culturelle*)  1 cap PO BID [facility]


   Last Admin: 07/10/17 08:38 Dose:  1 cap


Multivitamins (Theragran Tab*)  1 tab PO DAILY [facility]


   Last Admin: 07/10/17 08:38 Dose:  1 tab


Omeprazole (Prilosec Cap*)  20 mg PO DAILY@0730 [facility]


   Last Admin: 07/10/17 09:20 Dose:  20 mg


Ondansetron HCl (Zofran Inj*)  4 mg IV Q6H PRN


   PRN Reason: nausea


   Last Admin: 06/29/17 14:43 Dose:  4 mg


Oxycodone HCl (Roxycodone Tab*)  30 mg PO Q4H PRN


   PRN Reason: PAIN


   Last Admin: 07/10/17 11:10 Dose:  30 mg


Oxycodone HCl (Oxycontin(*))  40 mg PO BID [facility]


   Last Admin: 07/10/17 08:37 Dose:  40 mg


Pharmacy Consult (Vancomycin Per Pharmacy*)  1 note FOLLOW UP . PRN


   PRN Reason: PER PROTOCOL


Trazodone HCl (Desyrel Tab*)  25 mg PO BEDTIME PRN


   PRN Reason: insomnia


   Last Admin: 07/09/17 22:16 Dose:  25 mg


Vitamin B Complex/Vitamin E (Complex B-100*)  1 tab PO DAILY [facility]


   Last Admin: 07/10/17 08:38 Dose:  1 tab








 Vital Signs











  07/09/17 07/09/17 07/09/17





  14:00 15:34 16:00


 


Temperature  97.9 F 


 


Pulse Rate  64 


 


Respiratory 16 14 16





Rate   


 


Blood Pressure  125/65 





(mmHg)   


 


O2 Sat by Pulse  94 





Oximetry   














  07/09/17 07/09/17 07/09/17





  17:12 18:18 19:13


 


Temperature   


 


Pulse Rate   


 


Respiratory  16 16





Rate   


 


Blood Pressure   





(mmHg)   


 


O2 Sat by Pulse 94  





Oximetry   














  07/09/17 07/09/17 07/09/17





  19:18 20:18 20:51


 


Temperature 97.8 F  


 


Pulse Rate 67  


 


Respiratory 18 14 15





Rate   


 


Blood Pressure 118/61  





(mmHg)   


 


O2 Sat by Pulse 95  





Oximetry   














  07/09/17 07/09/17 07/09/17





  22:12 22:51 23:49


 


Temperature   97.9 F


 


Pulse Rate   73


 


Respiratory 20 15 14





Rate   


 


Blood Pressure   123/56





(mmHg)   


 


O2 Sat by Pulse   92





Oximetry   














  07/09/17 07/10/17 07/10/17





  23:50 02:08 03:32


 


Temperature   98.0 F


 


Pulse Rate   69


 


Respiratory 15 15 16





Rate   


 


Blood Pressure   139/63





(mmHg)   


 


O2 Sat by Pulse   95





Oximetry   














  07/10/17 07/10/17 07/10/17





  04:08 06:21 07:36


 


Temperature   97.4 F


 


Pulse Rate   68


 


Respiratory 14 15 18





Rate   


 


Blood Pressure   138/65





(mmHg)   


 


O2 Sat by Pulse   94





Oximetry   














  07/10/17 07/10/17 07/10/17





  07:43 08:37 10:57


 


Temperature   98.0 F


 


Pulse Rate   69


 


Respiratory 16 16 17





Rate   


 


Blood Pressure   128/77





(mmHg)   


 


O2 Sat by Pulse   96





Oximetry   














  07/10/17 07/10/17 07/10/17





  11:10 11:19 11:37


 


Temperature   98.5 F


 


Pulse Rate   69


 


Respiratory 16  18





Rate   


 


Blood Pressure   135/74





(mmHg)   


 


O2 Sat by Pulse  94 89





Oximetry   











Oxygen Devices in Use Now: Nasal Cannula


Appearance: Male patient, OOB to chair, drowsy but arousable, NAD


Eyes: No Scleral Icterus


Ears/Nose/Mouth/Throat: Mucous Membranes Moist


Neck: NL Appearance and Movements; NL JVP


Respiratory: Symmetrical Chest Expansion and Respiratory Effort, Clear to 

Auscultation


Cardiovascular: NL Sounds; No Murmurs; No JVD, RRR


Abdominal: NL Sounds; No Tenderness; No Distention


Extremities: - - RLE chronic venous changes, LLE dressing c/d/i to stump


Neurological: Alert and Oriented x 3


Lines/Tubes/Other Access: Clean, Dry and Intact Peripheral IV


Result Diagrams: 


 07/10/17 05:30





 07/10/17 05:30


Microbiology and Other Data: 


 








Assess/Plan/Problems-Billing


Assessment: 


L stump revision in a 63 yo M with hx of HTN, DM, COPD, chronic lymphedema, DAVID 

and hx of osteomyelitis of LLE s/p BKA








- Patient Problems


(1) Amputation stump infection


Code(s): T87.40 - INFECTION OF AMPUTATION STUMP, UNSPECIFIED EXTREMITY   Comment

: 


S/p revision by Dr. Kuhn on 6/26. 


Appreciate ID assistance, continue vancomycin/cefepime (switched from Zosyn). 


Wound vac removed by Dr. Kuhn on 6/29. 


Plan from ortho to observe in the hospital until patient is far along in 

healing process   





(2) Chronic pain


Status: Chronic   Code(s): G89.29 - OTHER CHRONIC PAIN   Comment: 


Continue increased oxycontin 40 mg BID and oxycodone 30 mg q4h prn. 


Concern for somnolence, no evidence of this at this time. 


Patient has good pain control at this time; would like to keep pain medication 

consistent.


Continue to monitor closely. May need to reduce qHS trazodone if concern 

persists.    





(3) Diabetes


Code(s): E11.9 - TYPE 2 DIABETES MELLITUS WITHOUT COMPLICATIONS   Comment: 


Fair control, BG mid 100s - 250s


Will add additional 10 units (for total of 90 units) this evening.


Continue Lispro SSI with carb coverage.   





(4) DAVID (obstructive sleep apnea)


Code(s): G47.33 - OBSTRUCTIVE SLEEP APNEA (ADULT) (PEDIATRIC)   Comment: 


Continue home BiPAP.   





(5) HTN (hypertension)


Code(s): I10 - ESSENTIAL (PRIMARY) HYPERTENSION   Comment: 


Normotensive


Continue amlodipine, atenolol.


Lisinopril on hold.   





(6) Lymphedema


Code(s): I89.0 - LYMPHEDEMA, NOT ELSEWHERE CLASSIFIED   Comment: 


Chronic, likely related to RV and diastolic failure. 


Continue furosemide.   





(7) DVT prophylaxis


Status: Acute   Code(s): YHM8455 -    Comment: 


SQ Lovenox


SCDs   


Status and Disposition: 





Inpatient. Dispo per ortho. Hospitalist co-medical management.

## 2017-07-10 NOTE — PN
Progress Note





- Progress Note


Date of Service: 07/10/17


SOAP: 


Subjective:


[]Patient sitting at edge of bed, feels sleepy and c/o of a headache.  Nsg 

reports that he has been more somnolent over the last 2 days without changes in 

his pain medications.  He states he took a sleeping pill last night and slept 

for 8 hrs.  Medicine has discontinued following him.  He wants to wait on 

having dressing change on LLE stump until he has been moved into his chair. 








Objective:


[]


 Vital Signs











Temp  97.4 F   07/10/17 07:36


 


Pulse  68   07/10/17 07:36


 


Resp  16   07/10/17 08:37


 


BP  138/65   07/10/17 07:36


 


Pulse Ox  94   07/10/17 07:36








 Intake & Output











 07/09/17 07/10/17 07/10/17





 18:59 06:59 18:59


 


Intake Total 1780 990 480


 


Output Total 1260 100 


 


Balance 520 890 480


 


Intake:   


 


  Oral 1780 990 480


 


Output:   


 


  Urine 1260 100 


 


Other:   


 


  # Bowel Movements 1  


 


  Estimated Stool Amount Medium  


 


  # Voids 2  








 Laboratory Results - last 24 hr











  07/09/17 07/09/17 07/09/17





  11:23 16:34 20:34


 


Hgb   


 


Hct   


 


Plt Count   


 


MPV   


 


BUN   


 


Creatinine   


 


Est GFR ( Amer)   


 


Est GFR (Non-Af Amer)   


 


POC Glucose (mg/dL)  245 H  211 H  148 H


 


Vancomycin Trough   














  07/10/17 07/10/17 07/10/17





  05:30 05:30 07:07


 


Hgb  9.8 L  


 


Hct  31 L  


 


Plt Count  208  


 


MPV  8  


 


BUN   14 


 


Creatinine   0.73 


 


Est GFR ( Amer)   140.9 


 


Est GFR (Non-Af Amer)   109.6 


 


POC Glucose (mg/dL)    148 H


 


Vancomycin Trough   16.4 








no active drainage on his LLE ACE


dressing change pending








Assessment:


[]s/p revision infected LLE stump- 2 weeks post op. 








Plan:


[]Dressing change when in chair betadine WTD


  Vanco and Cefepime


  Rehab when wound is healed per Dr. Kuhn


  Reconsult medicine for continued medical follow and diabetes management

## 2017-07-11 NOTE — PN
Subjective


Date of Service: 07/11/17


Interval History: 





No nursing concerns this AM for somnolence or increased confusion. Patient's 

CO2 level mildly elevated - patient admits to taking his BiPAP off at night, 

"because it annoys me." Discussed with the patient the importance of using the 

machine as long as possible during the night, given his history of DAVID. Patient 

verbalized understanding. No other complaints. Patient in agreement with plan 

for discharge to [place], pending insurance approval.








Family History: Unchanged from Admission


Social History: Unchanged from Admission


Past Medical History: Unchanged from Admission





Objective


Active Medications: 








Al Hydrox/Mg Hydrox/Simethicone (Maalox Plus*)  30 ml PO Q4H PRN


   PRN Reason: INDIGESTION


Amlodipine Besylate (Norvasc Tab*)  5 mg PO BEDTIME [facility]


   Last Admin: 07/10/17 21:06 Dose:  5 mg


Atenolol (Tenormin Tab*)  50 mg PO QAM [facility]


   Last Admin: 07/11/17 09:06 Dose:  50 mg


Atorvastatin Calcium (Lipitor*)  20 mg PO 1700 [facility]


   Last Admin: 07/10/17 17:01 Dose:  20 mg


Calcium Carbonate (Tums*)  1,000 mg PO BID PRN


   PRN Reason: INDIGESTION


   Last Admin: 07/09/17 17:56 Dose:  1,000 mg


Cetirizine HCl (Zyrtec*)  10 mg PO DAILY [facility]


   PRN Reason: Protocol


   Last Admin: 07/11/17 09:06 Dose:  10 mg


Cholecalciferol (Vitamin D Tab*)  400 unit PO DAILY [facility]


   Last Admin: 07/11/17 09:07 Dose:  400 unit


Dextrose (D50w Syringe 50 Ml*)  12.5 gm IV PUSH .FOR FS < 60 - SS PRN


   PRN Reason: FS < 60


Diclofenac Sodium (Voltaren 1% Gel (Nf))  1 applic TOPICAL QID [facility]


   PRN Reason: Protocol


   Last Admin: 07/11/17 09:00 Dose:  Not Given


Diphenhydramine HCl (Benadryl Iv*)  25 mg IV Q6H PRN


   PRN Reason: itching


Docusate Sodium (Colace Cap*)  100 mg PO BID [facility]


   Last Admin: 07/11/17 09:00 Dose:  Not Given


Enoxaparin Sodium (Lovenox(*))  40 mg SUBCUT Q24H [facility]


   Last Admin: 07/11/17 09:07 Dose:  40 mg


Furosemide (Lasix Tab*)  20 mg PO DAILY [facility]


   Last Admin: 07/11/17 09:06 Dose:  20 mg


Heparin Sodium (Porcine) (Heparin Flush Picc/Ml/Cvc(*))  1 ml FLUSH 0600,1800 

[facility]


   PRN Reason: Protocol


   Last Admin: 07/11/17 09:56 Dose:  1 ml


Hydroxyzine HCl (Atarax Tab*)  25 mg PO Q4H PRN


   PRN Reason: ITCHING


   Last Admin: 07/11/17 09:56 Dose:  25 mg


Cefepime HCl 2 gm/ Sodium (Chloride)  50 mls @ 100 mls/hr IVPB Q12H [facility]


   Last Admin: 07/11/17 09:07 Dose:  100 mls/hr


Vancomycin HCl 1,250 mg/ (Sodium Chloride)  250 mls @ 166.667 mls/hr IVPB Q8H 

[facility]


   Last Admin: 07/11/17 05:34 Dose:  166.667 mls/hr


Insulin Glargine (Lantus(*))  80 units SUBCUT BID [facility]


   Last Admin: 07/11/17 09:57 Dose:  80 units


Insulin Human Lispro (Humalog*)  0 - 15 units SUBCUT AC [facility]


   PRN Reason: Protocol


   Last Admin: 07/11/17 07:23 Dose:  Not Given


Insulin Human Lispro (Humalog*)  0 - 15 units SUBCUT AC [facility]


   PRN Reason: Protocol


   Last Admin: 07/11/17 09:58 Dose:  11 unit


Lactobacillus Rhamnosus (Culturelle*)  1 cap PO BID [facility]


   Last Admin: 07/11/17 09:07 Dose:  1 cap


Multivitamins (Theragran Tab*)  1 tab PO DAILY [facility]


   Last Admin: 07/11/17 09:07 Dose:  1 tab


Omeprazole (Prilosec Cap*)  20 mg PO DAILY@0730 [facility]


   Last Admin: 07/11/17 09:56 Dose:  20 mg


Ondansetron HCl (Zofran Inj*)  4 mg IV Q6H PRN


   PRN Reason: nausea


   Last Admin: 07/10/17 18:22 Dose:  4 mg


Oxycodone HCl (Roxycodone Tab*)  30 mg PO Q4H PRN


   PRN Reason: PAIN


   Last Admin: 07/11/17 09:56 Dose:  30 mg


Oxycodone HCl (Oxycontin(*))  40 mg PO BID [facility]


   Last Admin: 07/11/17 09:06 Dose:  40 mg


Pharmacy Consult (Vancomycin Per Pharmacy*)  1 note FOLLOW UP . PRN


   PRN Reason: PER PROTOCOL


Trazodone HCl (Desyrel Tab*)  25 mg PO BEDTIME PRN


   PRN Reason: insomnia


   Last Admin: 07/10/17 21:07 Dose:  25 mg


Vitamin B Complex/Vitamin E (Complex B-100*)  1 tab PO DAILY [facility]


   Last Admin: 07/11/17 09:07 Dose:  1 tab








 Vital Signs











  07/10/17 07/10/17 07/10/17





  11:19 11:37 13:10


 


Temperature  98.5 F 


 


Pulse Rate  69 


 


Respiratory  18 16





Rate   


 


Blood Pressure  135/74 





(mmHg)   


 


O2 Sat by Pulse 94 89 





Oximetry   














  07/10/17 07/10/17 07/10/17





  15:23 15:41 17:23


 


Temperature  98.0 F 


 


Pulse Rate  65 


 


Respiratory 16 20 18





Rate   


 


Blood Pressure  143/68 





(mmHg)   


 


O2 Sat by Pulse  95 





Oximetry   














  07/10/17 07/10/17 07/10/17





  19:28 19:31 19:39


 


Temperature   


 


Pulse Rate   


 


Respiratory 18 18 18





Rate   


 


Blood Pressure   





(mmHg)   


 


O2 Sat by Pulse   





Oximetry   














  07/10/17 07/10/17 07/10/17





  19:57 21:28 23:16


 


Temperature 98.1 F  97.9 F


 


Pulse Rate 70  66


 


Respiratory 22 18 16





Rate   


 


Blood Pressure 144/90  121/59





(mmHg)   


 


O2 Sat by Pulse 94  93





Oximetry   














  07/10/17 07/11/17 07/11/17





  23:27 01:27 05:25


 


Temperature   


 


Pulse Rate   


 


Respiratory 20 18 22





Rate   


 


Blood Pressure   





(mmHg)   


 


O2 Sat by Pulse   





Oximetry   














  07/11/17 07/11/17 07/11/17





  05:32 07:25 09:06


 


Temperature 98.0 F  


 


Pulse Rate 73  


 


Respiratory 18 16 16





Rate   


 


Blood Pressure 153/65  





(mmHg)   


 


O2 Sat by Pulse 92  





Oximetry   














  07/11/17





  09:56


 


Temperature 


 


Pulse Rate 


 


Respiratory 16





Rate 


 


Blood Pressure 





(mmHg) 


 


O2 Sat by Pulse 





Oximetry 











Oxygen Devices in Use Now: Nasal Cannula


Appearance: Male patient, lying in bed, NAD


Eyes: No Scleral Icterus


Ears/Nose/Mouth/Throat: Mucous Membranes Moist


Neck: NL Appearance and Movements; NL JVP


Respiratory: Symmetrical Chest Expansion and Respiratory Effort, Clear to 

Auscultation


Cardiovascular: NL Sounds; No Murmurs; No JVD, RRR


Neurological: Alert and Oriented x 3


Result Diagrams: 


 07/10/17 05:30





 07/11/17 05:30


Microbiology and Other Data: 


 








Assess/Plan/Problems-Billing


Assessment: 


L stump revision in a 63 yo M with hx of HTN, DM, COPD, chronic lymphedema, DAVID 

and hx of osteomyelitis of LLE s/p BKA








- Patient Problems


(1) Amputation stump infection


Code(s): T87.40 - INFECTION OF AMPUTATION STUMP, UNSPECIFIED EXTREMITY   Comment

: 


S/p revision by Dr. Kuhn on 6/26. 


Appreciate ID assistance, continue vancomycin/cefepime 


Wound vac removed by Dr. Kuhn on 6/29. 


Plan to d/c to [place] rehab with continued IV antibiotics   





(2) Chronic pain


Status: Chronic   Code(s): G89.29 - OTHER CHRONIC PAIN   Comment: 


Continue increased oxycontin 40 mg BID and oxycodone 30 mg q4h prn. 


Suspect somnolence secondary to BiPAP non-compliance


Patient has good pain control at this time.   





(3) Diabetes


Code(s): E11.9 - TYPE 2 DIABETES MELLITUS WITHOUT COMPLICATIONS   Comment: 


Fair control


Continue Lantus 80 units qAM and 90 units qPM, with careful attention to 

fasting BG


Continue Lispro SSI with carb coverage.   





(4) DAVID (obstructive sleep apnea)


Code(s): G47.33 - OBSTRUCTIVE SLEEP APNEA (ADULT) (PEDIATRIC)   Comment: 


Continue home BiPAP.   





(5) HTN (hypertension)


Code(s): I10 - ESSENTIAL (PRIMARY) HYPERTENSION   Comment: 


Normotensive


Continue amlodipine, atenolol.


Resume lisinopril on discharge   





(6) Lymphedema


Code(s): I89.0 - LYMPHEDEMA, NOT ELSEWHERE CLASSIFIED   Comment: 


Chronic, likely related to RV and diastolic failure. 


Continue furosemide.   





(7) DVT prophylaxis


Status: Acute   Code(s): DPX9900 -    Comment: 


SQ Lovenox


SCDs   


Status and Disposition: 





Inpatient. Dispo per ortho. Hospitalist co-medical management. Plan for dc to 

[place].

## 2017-07-11 NOTE — DS
DISCHARGE SUMMARY:

 

DATE OF ADMISSION:  06/26/17

 

DATE OF DISCHARGE:  07/11/17

 

ATTENDING PHYSICIAN:  Dr. Kuhn. (DICTATED BY SHIRA ETIENNE)

 

ADMISSION DIAGNOSIS:  Breakdown, left transtibial amputation site with deep 
necrotic tissue.

 

DISCHARGE DIAGNOSES:

1.  Breakdown, left transtibial amputation site with deep necrotic tissue.

2.  Chronic lymphedema.

3.  Diabetes mellitus.

4.  Chronic obstructive pulmonary disease.

5.  Chronic pain.

6.  Obstructive sleep apnea.

7.  Hypertension.

8.  Morbid obesity.

 

SURGERY PERFORMED:  Revision, infected left lower extremity stump status post 
BKA.

 

HISTORY OF PRESENT ILLNESS:  The patient is a 62-year-old male with multiple 
medical problems including diabetes mellitus, chronic lymphedema, osteomyelitis 
of the left calcaneus, status post BKA that was done initially on 06/05/17.  
The patient was discharged to [facility] and was reevaluated 
at the end of June and was noted to have wet necrotic tissue of the incision.  
He was readmitted on 06/26/17 under the service of Dr. Jose Kuhn, at which 
time he was taken back to the operating room for revision of his infected stump 
and placement of wound VAC.  He had the wound VAC placed for roughly 7 to 10 
days, which there noted to be improvement and he was switched over to Betadine 
wet-to-dry dressing changes daily.  The patient was reevaluated by Dr. Mejia 
from Infectious Disease and was placed on vancomycin and cefepime.  He 
continued to improve and he progressed to saline-soaked 4x4 wet-to-dry dressing 
changes.  He was followed by the medical service regarding his multiple medical 
comorbidities and was found to be stable for discharge to [facility], 07/11/17.

 

CONDITION ON DISCHARGE:  He is afebrile.  His dressings were changed this 
morning and there is significant improvement with minimal necrosis at the 
incision site. New saline-soaked wet-to-dry dressings were then applied with 
Kerlix and Ace wraps.

 

DISCHARGE INSTRUCTIONS:  Per Dr. Mejia's recommendations, we will continue 
with vancomycin 1250 mg q.8 hours and cefepime 2 g q.12 hours x14 more days.  
He should have a vanco trough, a CBC, CMP, CRP weekly with results sent to Dr. Mejia.  He will remain nonweightbearing, Yonis transfers.  Dr. Kuhn would 
like to see him in followup in roughly 7 to 10 days in the office for 
reevaluation of his wound.

 

____________________________________ SHIRA ETIENNE

 

[id number]/[facility] #: [id number]

MYA

## 2018-03-13 NOTE — OP
OPERATIVE REPORT:

 

DATE OF OPERATION:  03/12/18

 

DATE OF BIRTH:  [birth year]

 

SURGEON:  Jose Kuhn MD

 

ASSISTANT:  Inez Carrion PA-C.

 

PRE-OP DIAGNOSIS:  Chronic ulceration right second toe with surrounding cellulitis and deep infection
.

 

POST-OP DIAGNOSIS:  Chronic ulceration right second toe with surrounding cellulitis and deep infectio
n.

 

OPERATIVE PROCEDURE:  Disarticulation, right second toe MTP joint.

 

DESCRIPTION OF PROCEDURE:  Patient was taken to the operating where ankle Esmarch was applied.  We ma
de a transverse elliptical incision at the proximal phalanx level of his right second toe.  We dissec
shoshana subperiosteally to disarticulate the second toe at the MTP joint level.  This was sent to patholo
gy, deep cultures were sent.  We then irrigated thoroughly, dropped the tourniquet and closed dorsal 
plantar flaps with 2-0 Vicryl suture and 3-0 nylon for the skin and a compression dressing applied.

 

 [id number]/[facility] #: [id number]

## 2018-10-11 NOTE — RAD
CPT II Codes: 



Procedure(s) performed:

1. Diagnostic pelvic and right lower extremity arteriogram.

2. Minx closure device to the left common femoral arteriotomy.



Date of service: October 11, 2018



Indication for procedure: Nonhealing right forefoot wound in a patient with a history of

left BKA



Comparison: PEDRO dated September 14, 2018 that was of of limited diagnostic utility due to

suspected calcified atherosclerosis



Contrast: 150 mL Omnipaque 350



Fluoroscopy Time: 7 minutes



Vessels Accessed: 

Percutaneous access was obtained with ultrasound guidance in the left common femoral

artery in the retrograde direction towards the heart. 

Catheter arteriography, with the catheter tip located within the lumen of the following

arteries, was performed at the left external iliac artery, left common iliac artery, aorta

and superficial femoral artery.



Anesthesia: Conscious sedation with IV Fentanyl and Versed as well as local 1% lidocaine

injected locally at the arteriotomy site.



Conscious sedation time:

Timeout: 1315 hours

Case end: 1401 hours

Total conscious sedation time: 46 minutes



Additional medications:



*  The patient received 1 mg of p.o. Ativan prior to the onset of the procedure.



PROCEDURE NOTE AND INTRAPROCEDURAL IMAGING FINDINGS:



Image quality and image acquisition is severely limited throughout the examination due to

the patient's large body habitus attenuating the fluoroscopic imaging over the abdomen and

pelvis and the patient's back and hip discomfort causing him to move throughout most of

the procedure.



Immediately prior to the procedure the patient signed consent after thoroughly discussing

all risks, benefits and alternative therapies. The patient was positioned on the

fluoroscopy table in the supine position and the bilateral groins were shaved, prepped and

the patient was draped in standard sterile fashion.



Using fluoroscopic imaging the location of the left common femoral head was marked

externally with a skin marker on the patient's groin. Utilizing sonographic guidance and

palpation, the left common femoral artery was cannulated overlying the femoral head with

an 18-gauge needle. An ultrasound image was saved.



A 0.035" wire was slowly and smoothly advanced into the common femoral artery under

fluoroscopic imaging. No buckling of the wire was visualized to indicate dissection. With

the wire securing percutaneous arterial access, the needle was removed and a 5-[place] Lankan

SideArm access sheath was advanced under fluoroscopic control into the common femoral

artery retrograde into the left external iliac artery securing access.



To further characterize and exactly locate the extent of atherosclerotic disease involving

the pelvis and right lower extremity diagnostic catheter arteriography was necessary.



Power injector arteriography of the lower abdominal aorta and bilateral iliac arteries was

performed demonstrating the visualized arteries to be adequately patent.



Utilizing a 5-[place]ankan rim catheter and hydrophilic wire the contralateral iliac artery was

accessed and the wire advanced into the right superficial femoral artery. Over the wire

the reverse curve catheter was replaced with the 5-[place]ankan multiside hole flush catheter

and contrast arteriography was performed. The right common femoral artery, proximal

femoral profundus and proximal right superficial femoral artery are adequately patent.



Arteriography more inferiorly demonstrates multiple foci of calcified stenoses in the

distal right superficial femoral artery as it courses through Alex's canal but patency

is maintained. As noted above, image quality is limited by motion artifact.



The popliteal artery provides patent in-line flow into the tibioperoneal trunk and

proximal infrapopliteal arteries. The right posterior tibial artery is the dominant artery

in the right upper leg. The anterior tibial artery becomes increasingly diminutive at the

mid-level right lower leg though motion artifact prevents more reliable evaluation of the

artery.



Finally 2 digital subtraction angiographic images were acquired of the right lower leg and

foot however the patient moved throughout image acquisition rendering the images of

limited diagnostic utility. The right posterior tibial artery is the dominant flow into

the foot and fills the plantar arteries proximally. There is no filling of the distal

right anterior tibial artery or peroneal artery observed but this could be due to severe

motion distortion. There is no significant filling of the pedal loop to determine if there

is distal communication between branches of the PROSPER and PTA.



At this point the patient's back pain and inability to remain still prevented further

imaging. A wire was reinserted and the flush catheter and both were removed under

fluoroscopic control.



Through the side arm of the access sheath arteriography of the left common femoral artery

demonstrated an appropriate puncture of the common femoral artery above the bifurcation

and below the inferior epigastric artery.



After an appropriate resterilization of the arteriotomy and exchange for new sterile

gloves, a Minx closure device was deployed at the common femoral arteriotomy and pressure

held for approximately 15 minutes.



There were no signs of bleeding at the percutaneous arterial access site and the site was

dressed with sterile gauze and Tegaderm.



The patient tolerated the procedure well and was transferred to angiography holding bay

for standard post procedural observation.



SUMMARY OF PROCEDURE, IMAGING FINDINGS AND INTERVENTIONS PERFORMED:



1. Diagnostic studies performed:

*  Percutaneous access was obtained with ultrasound guidance in the left common femoral

artery in the retrograde direction towards the heart. 

*  Catheter arteriography, with the catheter tip located within the lumen of the following

arteries, was performed at the left external iliac artery, left common iliac artery, aorta

and superficial femoral artery.

*  Catheter arteriography was performed of the lower abdominal aorta and bilateral iliac

arterial system up to the bilateral proximal superficial femoral arteries and all arteries

of the right lower extremity as far as the right foot.

*  At the conclusion of the procedure arteriography was performed through the side arm of

the access sheath to image the distal left external iliac artery, left common femoral

artery and proximal superficial femoral artery and femoral profundus.



2. Interpretation of diagnostic studies performed:

*  Patent in-line flow is documented from the abdominal aorta through the bilateral iliac

arterial system up to the proximal bilateral femoral arteries.

*  There are multifocal calcified stenoses in the distal right SFA as it courses through

Alex's canal but in-line flow is maintained into the proximal infrapopliteal arteries.

*  The patient's dominant arterial flow to the right foot is provided by the posterior

tibial artery.

*  There is no definite filling of the distal most right PROSPER or peroneal arteries but the

image acquisition is distorted by patient motion. 2 arteriograms were attempted of the

right foot but both imaging sequences were rendered mostly nondiagnostic due to the

patient aggressively moving his leg and foot.

*  Arteriography performed for the purpose of deploying a percutaneous arterial closure

device demonstrates adequately patent left external iliac artery, common femoral artery

and proximal superficial femoral artery and femoral profundus.



3. Surgical interventions performed:

*  Closure of the right common femoral artery was achieved with a Minx closure device

followed by 15 minutes of gentle manual pressure.



Plan:



1. Evaluation of the distal infrapopliteal arteries can be further supplemented with

duplex arterial ultrasound on a nonemergent basis.

2. After revascularization of the infrapopliteal arteries is deemed necessary for wound

healing and to preserve the right foot and lower leg that is anticipated that

anesthesiology support will be necessary.

## 2020-07-06 NOTE — DS
CC:  Dr. Blevins, Dr. Mejia, and Dr. Kuhn *

 

DISCHARGE SUMMARY:

 

DATE OF ADMISSION:  05/30/17

 

DATE OF DISCHARGE:  06/16/17.

 

PRIMARY CARE PROVIDER:  Dr. Blevins.

 

ORTHOPEDIC SURGEON:  Dr. Kuhn.

 

INFECTIOUS DISEASE SPECIALIST:  Dr. Mejia.

 

PRINCIPAL DIAGNOSES:

1.  Left calcaneus osteomyelitis status post left below knee amputation.

2.  Right lower extremity cellulitis - Resolved.

3.  Uncontrolled type 2 diabetes.

4.  Hypertension.

5.  Obstructive sleep apnea.

6.  Morbid obesity.

7.  Urinary retention - resolved.

8.  Chronic obstructive pulmonary disease.

9.  Chronic pain.

 

DISCHARGE MEDICATIONS:

1.  Metformin 1000 mg p.o. b.i.d.

2.  Amlodipine 5 mg p.o. q.h.s.

3.  Vitamin B complex 1 tab p.o. daily.

4.  Omeprazole 20 mg p.o. daily.

5.  Lisinopril 30 mg p.o. daily.

6.  Lactobacillus 1 cap p.o. b.i.d.

7.  Vitamin D 400 units p.o. daily.

8.  Tums 1000 mg p.o. b.i.d. p.r.n. indigestion.

9.  Lipitor 80 mg p.o. daily.

10.  Atenolol 50 mg p.o. daily.

11.  OxyContin 30 mg p.o. twice daily.

12.  Oxycodone 20 mg p.o. q. 4 hours p.r.n. pain.

13.  Lyrica 75 mg p.o. b.i.d.

14.  Zosyn 3.375 g IV q. 4 hours x4 weeks with weekly CBC, CMP, and CRP with 
results to Dr. Mejia.

15.  Nystatin powder applied topically twice daily to areas of candida 
intertrigo.

16.  Melatonin 3 mg p.o. q.h.s. p.r.n. insomnia.

17.  Imodium 2 mg p.o. after each loose stool up to the maximum of 16 mg per 
day.

18.  Lispro 7 units standing with meals and sliding scale with meals.

19.  Lantus 72 units subcutaneous twice daily.

20.  Lasix 20 mg p.o. daily.

21.  Zyrtec 10 mg p.o. daily.

 

HOSPITAL COURSE:  Mr. Katz is a 62-year-old male with a history of hypertension
, hyperlipidemia, type 2 diabetes, and known osteomyelitis of the left 
calcaneus who has been followed by Dr. Kuhn, presented to the hospital after 
being seen in Dr. Kuhn's office where his left heel infection was noted to be 
markedly worse.  The patient was directly admitted from Dr. Kuhn's office.  
He was started on IV antibiotics.  The patient did undergo consultation with 
Dr. Mejia on 05/31/17. At the time, he recommended treating the patient with 
vancomycin alone initially. An MRI of both feet were obtained; the right lower 
extremity was noted to be cellulitic and there were concerns of ulceration, and 
possible osteomyelitis of the right foot.  The right lower extremity MRI did 
not revealed any evidence of osteomyelitis.  The left foot MRI revealed 
calcaneal osteomyelitis centered in the posterior inferior aspect of the 
calcaneus which demonstrated interval progression. The patient continued on IV 
antibiotics through 06/15/17 at which time he underwent a left below knee 
amputation.  The patient has remained on IV antibiotics per Dr. Mejia.  On 06
/12/17, Dr. Kuhn during his dressing change was concerned for a possible 
pseudomonas infection.  At the time, the patient's antibiotic was changed to 
Zosyn 3.375 g IV q. 8 hours.  The patient is to continue on the Zosyn for 
another 4 weeks.

 

Chronic pain has been a longstanding issue for the patient.  Prior to his 
admission, he was on Suboxone.  Pain control during the hospitalization was 
difficult to achieve; however, by discharge the patient's pain is moderately 
well controlled.  He remained on OxyContin 30 mg p.o. twice daily and oxycodone 
20 mg p.o. q. 4 hours p.r.n. pain.  The patient will need to have the pain 
medication regimen adjusted (hopefully with improvement in his pain decrease 
down) by the providers at the rehab.  In terms of the patient's type 2 diabetes
, this is totally uncontrolled.  His Lantus dose has been dramatically 
increased over the last several days.  He should start 72 units of Lantus twice 
daily starting this evening.  Additionally, he will continue on lispro 7 units 
standing with meals and lispro sliding scale.  Additionally, I am starting the 
patient back on his usual dose of metformin.

 

In terms of the patient's other chronic medical condition, he has been 
maintained on his usual home medication regimen.  The patient has finally 
gotten to the point where he is able to be lifted and sat in a wheelchair.  His 
stump is relatively comfortable as long as it is elevated and not dangling.  
The patient will need aggressive physical therapy to get him back as mobile.

 

FOLLOWUP CONCERNS:  The patient is being discharged to [facility] today, 06/16/
17.

 

ACTIVITY LEVEL:  As tolerated.

 

DIET:  Low fat, diabetic.

 

CONDITION ON DISCHARGE:  Stable.

 

FOLLOWUP:  The patient is to have a CBC, CMP, and CRP weekly starting on 06/19/
17 with results sent to Dr. Mejia.  The patient should follow up with Dr. Kuhn in 7 to 10 days.  Dressing changes for the stump are to be Betadine 
soaked gauze, wrapped, then with Kerlix and then an Ace wrap.

 

TIME SPENT:  Fifty five minutes was spent discharging this patient.  Please see 
the complete medical record for further details of this very prolonged and 
complex hospitalization.

 

 [id number]/CPS #: [id number]

MTDD
Negative

## 2020-09-03 NOTE — PN
Addended by: SPIKE BYRNE on: 9/3/2020 08:18 AM     Modules accepted: Orders     Progress Note





- Progress Note


Date of Service: 07/09/17


SOAP: 


Subjective:


[Subjective:


POD #13 Left leg stump revision. Doing very well, pain decreased. Denies n/v, f/

c/ns





Objective:





Gen: A&Ox3, NAD at rest sitting in chair


LLE: Incision healing well with sutures in place. No d/c from wound.. Pt is 

able to move knee. NV intact proximal to site. 








 Vital Signs











Temp  97.5 F   07/09/17 03:57


 


Pulse  69   07/09/17 03:57


 


Resp  16   07/09/17 09:21


 


BP  145/62   07/09/17 03:57


 


Pulse Ox  96   07/09/17 03:57








 Intake & Output











 07/08/17 07/09/17 07/09/17





 18:59 06:59 18:59


 


Intake Total 2315 1855 


 


Output Total 1977 1200 400


 


Balance 338 655 -400


 


Intake:   


 


  IV Fluids 675 55 


 


    ABX - VANCOMYCIN 530  


 


    Cefepime 55 55 


 


    NS (0.9%) 90  


 


  Oral 1640 1800 


 


Output:   


 


  Urine 1975 1200 400


 


  Straight Cath 2  


 


Other:   


 


  Estimated Void Medium  


 


  # Bowel Movements  0 














Assessment:


POD #13 Left leg stump revision





Plan:


Continue daily betadine WTD dressing changes


Continue IV abx


D/C to rehab once wound is fully healed

## 2023-04-26 NOTE — RAD
Indication: Evaluate for osteomyelitis.



Sequences: Sagittal T1, STIR, axial T1, STIR, coronal T1 and STIR images of the right foot

were obtained.



The phalanges and metatarsals demonstrates no evidence of increased signal to suggest

osteomyelitis. No evidence of bone marrow replacement is noted. Motion artifact degrades

the images. There is mild diffuse soft tissue swelling in the lateral aspect of the foot

as well as in the dorsal aspect of the foot. No drainable fluid collections are

identified.



IMPRESSION: Moderate degree of subcutaneous edema. No evidence of bone marrow edema or

replacement to suggest osteomyelitis is present. What Type Of Note Output Would You Prefer (Optional)?: Bullet Format Hpi Title: Evaluation of Skin Lesions How Severe Are Your Spot(S)?: moderate Additional History: FBSE and a follow up

## 2023-08-27 NOTE — CONS
CONSULTATION REPORT:

 

DATE OF CONSULT:  05/31/17

 

REQUESTING PROVIDER:  SHIRA Woodward.

 

CONSULTING SERVICE:  Infectious Disease.

 

REASON FOR CONSULTATION:  Right great toe ulceration, left heel ulcer and 
associated cellulitis.

 

IMPRESSION:

1.  Chronic left calcaneal ulceration with chronic osteomyelitis, diabetes 
related now associated with cellulitis usually gram positive organisms.

2.  Right great toe blister and ulceration with associated cellulitis 
underlying osteomyelitis is a distinct possibility.

3.  Bilateral lower extremity lymphedema.

4.  Morbid obesity.

5.  Diabetes with neuropathy.

 

RECOMMENDATIONS:  Agree with vancomycin goal trough of 15 to 20.  Will follow 
his leg exma and continue the efforts at leg elevation and perhaps some 
diuresis to see about improving his lymphedema, which is a contributing factor 
to all of his conditions.

 

HISTORY OF PRESENT ILLNESS:  This is a 61-year-old man with morbid obesity, 
diabetes, bilateral lower extremity lymphedema admitted with worsening left 
foot pain, swelling, and new right toe blister.  He was seen by Dr. Kuhn 
recently who noted him to have a large blister on the medial aspect of the 
right great toe with worsening lymphedema in both legs.  He has had more pain 
in the left heel over the last 2 days with associated redness and swelling.  He 
has been on doxycycline, which he had tolerated well.  A couple of months ago, 
he had a long course of cefepime and Flagyl with some improvement.  He is 
followed by the wound clinic. His white blood cell count on admission was 11.  
He is afebrile.  His CRP was 50. He is started on vancomycin overnight.  The 
pain is a little bit better today though that is the worst in his left 
calcaneus.  He has had no fever, chills, or sweats.

 

PAST MEDICAL HISTORY:

1.  Diabetes with neuropathy.

2.  Left calcaneus osteomyelitis, chronic.

3.  Hyperlipidemia.

4.  Hypertension.

5.  Morbid obesity.

6.  Chronic lower extremity lymphedema bilaterally.

7.  Obstructive sleep apnea.

8.  Chronic pain had been on Suboxone.

 

ALLERGIES:  SULFA.

 

MEDICATIONS:

1.  Tylenol.

2.  Altenol.

3.  Lipitor.

4.  Calcium carbonate.

5.  Heparin subcutaneous injection.

6.  Insulin glargine.

7.  Lactobacillus.

8.  Lisinopril.

9.  Omeprazole.

10.  Vancomycin 1250 mg every 8 hours.

11.  Amlodipine.

12.  Metformin.

13.  Oxycodone.

 

SOCIAL HISTORY:  He lives in [place] with his wife.  He is on disability.  No 
travel.  No sick contacts.  He is nonsmoker.

 

FAMILY HISTORY:  No recurrent infections.

 

REVIEW OF SYSTEMS:  All negative except as noted above.

 

PHYSICAL EXAM:  Vital Signs:  Temperature 37, heart rate 80, respiratory rate 17
, blood pressure 130/60, O2 sat 97% on room air.  In general, he is awake not 
in distress.  Neurologic:  He is oriented x3.  Follows all commands.  He has 
decreased sensation to light touch in both feet bilaterally.  Neck is supple 
without nuchal rigidity.  HEENT:  There is no conjunctival hemorrhage.  
Oropharynx without lesions.  Heart:  Regular rate and rhythm without murmurs, 
rubs, or gallops.  Lungs are clear to auscultation bilaterally.  Abdomen:  Soft
, nontender, nondistended, it is obese.  There are bowel sounds present.  Skin:
  There are no splinter hemorrhages.  He has bilateral lower extremity venous 
stasis changes with bilateral lower extremity lymphedema and active serous 
weeping from both legs. Musculoskeletal:  There is no spine tenderness to 
palpation.  He has a large left calcaneal ulcer, most of the left lateral foot 
is macerated.  There is erythema from there up through the ankle.  There is no 
other ulceration.  On the right great toe, there is a medial ulceration with 
blister and some serous fluid.  There is maceration of the right foot as well 
with diffuse edema.

 

LABORATORY DATA:  Creatinine 0.9, white blood cell count 11, hemoglobin 11, 
platelets 322, MCV 79, vancomycin trough 13.7.

 

Please see impressions and recommendations as outlined above, which I have 
discussed.

 

 [id number]/CPS #: [id number]

MYA
CONSULTATION REPORT:

 

DATE OF CONSULT:  06/10/17

 

REASON FOR REFERRAL:  Pain in left stump.

 

HISTORY OF PRESENT ILLNESS:  Shane Katz is a 61-year-old male.  Somewhere 
around 7 years ago, he was sent by Dr. Chencho Townsend to see Dr. Juan Taylor for 
pain management regarding bilateral knee pain.  According to the patient, he 
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  He saw 
Dr. Taylor and was originally given oxycodone and other medications such as 
OxyContin.  The patient was discharged from Dr. Taylor's practice because he 
failed a urine test. The nature of the failure, the patient seemed to be a 
little vague on.  He was put on Suboxone 8 mg by Dr. Taylor.  He transferred his 
care to Dr. Wilder and continued on 8 mg of Suboxone.  The patient is an 
insulin-dependent diabetic with known osteomyelitis of his left calcaneus.  He 
was admitted directly to the hospital on May 30th after seeing Dr. Kuhn that 
day.  He had an MRI showing osteomyelitis in March.  He was given 8 weeks of IV 
vancomycin and then put on oral doxycycline and seeing the wound center, but 
the patient continued to have breakdowns.  He was admitted to the hospital and 
underwent a left below the knee amputation on .  Postoperatively, he 
has had lot of difficulty with pain control.  He was put on IV Dilaudid, 
OxyContin 30 twice a day, and oxycodone 15 mg up to 6 times a day.  I am asked 
to see him in consultation.

 

PAST MEDICAL HISTORY:  Significant for the aforementioned diabetes mellitus.  
In addition, he has a history of chronic lymphedema, COPD, hypertension, morbid 
obesity.  He has had a gastric sleeve procedure.

 

CURRENT MEDICATIONS:  Includin.  Dilaudid 1 mg IV q.4.

2.  OxyContin 30 twice a day.

3.  Oxycodone 15 mg up to 6 times a day.

4.  Insulin.

5.  Vancomycin intravenously.

6.  Lasix.

7.  Maxipime.

8.  Zyrtec.

9.  Heparin.

10.  Lisinopril.

11.  Lipitor.

12.  Tenormin.

13.  Prilosec.

14.  Culturelle.

15.  Norvasc.

16.  Tums.

17.  Tylenol.

 

ALLERGIES:  No known drug allergies.

 

SOCIAL HISTORY:  He is a nonsmoker, nondrinker.  He lives with his wife in a 
split- level house in [place].  According to the patient, there is chair lift 
[facility] the [facility].

 

REVIEW OF SYSTEMS:  No current shortness of breath.

 

PHYSICAL EXAM:  The patient's temperature is 98.2, blood pressure is 130/70, 
pulse 75, respirations 18.  HEENT:  His extraocular movements are intact.  
Tongue is midline.  Neck is supple.  Lungs sound _____.  His left leg is 
bandaged.  I did not examine his stump.  He does have edema noted in his right 
leg.  Functional Exam: He transfers with a Yonis lift.

 

ASSESSMENT:

1.  Left below the knee amputation.

2.  Chronic pain.

 

PLAN:  I think we should probably add Lyrica or Neurontin.  As Lyrica is easier 
to dose, we will go ahead and add Lyrica.  I will start him at 75 mg twice a 
day.  As far as his narcotic pain medications, I would be very careful with IV 
pain medications with somebody previously on Suboxone.  As I do not know the 
nature of what caused Dr. Taylor to switch him from oxycodone to Suboxone and 
discharge him from his practice, I would be cautious. Suboxone 8 mg was 
probably not sufficient to control his pain and he would probably do better 
with Suboxone 12.  I will try to contact Dr. Taylor on Monday.  In the meantime, 
I would need to get consent from the patient to do so.  In the meantime, we 
will try to cut back on his IV Dilaudid. I am going to add in Lyrica 75 mg 
twice a day.  As far as following up when he is done, hopefully he will be able 
to go back on Suboxone 12 and return to Dr. Wilder.

 

Thank you for the consult.

 

 [id number]/CPS #: [id number]

MYA
Previously Declined (within the last year)